# Patient Record
Sex: FEMALE | Race: WHITE | NOT HISPANIC OR LATINO | Employment: FULL TIME | URBAN - METROPOLITAN AREA
[De-identification: names, ages, dates, MRNs, and addresses within clinical notes are randomized per-mention and may not be internally consistent; named-entity substitution may affect disease eponyms.]

---

## 2017-01-02 ENCOUNTER — COMMUNICATION - HEALTHEAST (OUTPATIENT)
Dept: INTERNAL MEDICINE | Facility: CLINIC | Age: 57
End: 2017-01-02

## 2017-02-07 ENCOUNTER — COMMUNICATION - HEALTHEAST (OUTPATIENT)
Dept: INTERNAL MEDICINE | Facility: CLINIC | Age: 57
End: 2017-02-07

## 2017-04-02 ENCOUNTER — COMMUNICATION - HEALTHEAST (OUTPATIENT)
Dept: INTERNAL MEDICINE | Facility: CLINIC | Age: 57
End: 2017-04-02

## 2017-05-08 ENCOUNTER — COMMUNICATION - HEALTHEAST (OUTPATIENT)
Dept: INTERNAL MEDICINE | Facility: CLINIC | Age: 57
End: 2017-05-08

## 2017-07-01 ENCOUNTER — COMMUNICATION - HEALTHEAST (OUTPATIENT)
Dept: INTERNAL MEDICINE | Facility: CLINIC | Age: 57
End: 2017-07-01

## 2017-07-10 ENCOUNTER — RECORDS - HEALTHEAST (OUTPATIENT)
Dept: LAB | Facility: CLINIC | Age: 57
End: 2017-07-10

## 2017-07-10 ENCOUNTER — COMMUNICATION - HEALTHEAST (OUTPATIENT)
Dept: INTERNAL MEDICINE | Facility: CLINIC | Age: 57
End: 2017-07-10

## 2017-07-11 ENCOUNTER — COMMUNICATION - HEALTHEAST (OUTPATIENT)
Dept: INTERNAL MEDICINE | Facility: CLINIC | Age: 57
End: 2017-07-11

## 2017-07-11 LAB — HBA1C MFR BLD: 10.2 % (ref 4.2–6.1)

## 2017-07-17 ENCOUNTER — RECORDS - HEALTHEAST (OUTPATIENT)
Dept: ADMINISTRATIVE | Facility: OTHER | Age: 57
End: 2017-07-17

## 2017-07-31 ENCOUNTER — OFFICE VISIT - HEALTHEAST (OUTPATIENT)
Dept: INTERNAL MEDICINE | Facility: CLINIC | Age: 57
End: 2017-07-31

## 2017-07-31 DIAGNOSIS — I10 ESSENTIAL HYPERTENSION WITH GOAL BLOOD PRESSURE LESS THAN 140/90: ICD-10-CM

## 2017-07-31 DIAGNOSIS — E11.9 TYPE 2 DIABETES, HBA1C GOAL < 8% (H): ICD-10-CM

## 2017-07-31 DIAGNOSIS — R73.09 OTHER ABNORMAL GLUCOSE: ICD-10-CM

## 2017-07-31 DIAGNOSIS — I73.9 PVD (PERIPHERAL VASCULAR DISEASE) (H): ICD-10-CM

## 2017-07-31 ASSESSMENT — MIFFLIN-ST. JEOR: SCORE: 1549.26

## 2017-08-08 ENCOUNTER — AMBULATORY - HEALTHEAST (OUTPATIENT)
Dept: EDUCATION SERVICES | Facility: CLINIC | Age: 57
End: 2017-08-08

## 2017-08-08 DIAGNOSIS — E11.9 DIABETES (H): ICD-10-CM

## 2017-08-08 DIAGNOSIS — E11.9 TYPE 2 DIABETES, HBA1C GOAL < 8% (H): ICD-10-CM

## 2017-08-22 ENCOUNTER — OFFICE VISIT - HEALTHEAST (OUTPATIENT)
Dept: INTERNAL MEDICINE | Facility: CLINIC | Age: 57
End: 2017-08-22

## 2017-08-22 DIAGNOSIS — Z12.31 VISIT FOR SCREENING MAMMOGRAM: ICD-10-CM

## 2017-08-22 DIAGNOSIS — Z90.710 H/O: HYSTERECTOMY: ICD-10-CM

## 2017-08-22 DIAGNOSIS — I10 ESSENTIAL HYPERTENSION WITH GOAL BLOOD PRESSURE LESS THAN 140/90: ICD-10-CM

## 2017-08-22 DIAGNOSIS — E11.9 TYPE 2 DIABETES, HBA1C GOAL < 8% (H): ICD-10-CM

## 2017-08-22 DIAGNOSIS — Z12.11 SCREEN FOR COLON CANCER: ICD-10-CM

## 2017-08-22 ASSESSMENT — MIFFLIN-ST. JEOR: SCORE: 1535.65

## 2017-09-18 ENCOUNTER — COMMUNICATION - HEALTHEAST (OUTPATIENT)
Dept: ENDOCRINOLOGY | Facility: CLINIC | Age: 57
End: 2017-09-18

## 2017-09-18 ENCOUNTER — OFFICE VISIT - HEALTHEAST (OUTPATIENT)
Dept: EDUCATION SERVICES | Facility: CLINIC | Age: 57
End: 2017-09-18

## 2017-09-18 DIAGNOSIS — E11.9 TYPE 2 DIABETES, HBA1C GOAL < 8% (H): ICD-10-CM

## 2017-09-26 ENCOUNTER — COMMUNICATION - HEALTHEAST (OUTPATIENT)
Dept: INTERNAL MEDICINE | Facility: CLINIC | Age: 57
End: 2017-09-26

## 2017-09-26 DIAGNOSIS — E11.9 TYPE 2 DIABETES, HBA1C GOAL < 8% (H): ICD-10-CM

## 2017-11-06 ENCOUNTER — OFFICE VISIT - HEALTHEAST (OUTPATIENT)
Dept: INTERNAL MEDICINE | Facility: CLINIC | Age: 57
End: 2017-11-06

## 2017-11-06 ENCOUNTER — COMMUNICATION - HEALTHEAST (OUTPATIENT)
Dept: INTERNAL MEDICINE | Facility: CLINIC | Age: 57
End: 2017-11-06

## 2017-11-06 DIAGNOSIS — I10 ESSENTIAL HYPERTENSION: ICD-10-CM

## 2017-11-06 DIAGNOSIS — L03.90 CELLULITIS: ICD-10-CM

## 2017-11-06 DIAGNOSIS — E11.9 TYPE 2 DIABETES, HBA1C GOAL < 8% (H): ICD-10-CM

## 2017-11-06 LAB — HBA1C MFR BLD: 7.1 % (ref 3.5–6)

## 2017-11-06 ASSESSMENT — MIFFLIN-ST. JEOR: SCORE: 1522.04

## 2017-11-21 ENCOUNTER — OFFICE VISIT - HEALTHEAST (OUTPATIENT)
Dept: INTERNAL MEDICINE | Facility: CLINIC | Age: 57
End: 2017-11-21

## 2017-11-21 DIAGNOSIS — I10 ESSENTIAL HYPERTENSION: ICD-10-CM

## 2017-11-21 DIAGNOSIS — E11.9 TYPE 2 DIABETES, HBA1C GOAL < 8% (H): ICD-10-CM

## 2017-11-21 ASSESSMENT — MIFFLIN-ST. JEOR: SCORE: 1535.65

## 2017-11-27 ENCOUNTER — OFFICE VISIT - HEALTHEAST (OUTPATIENT)
Dept: EDUCATION SERVICES | Facility: CLINIC | Age: 57
End: 2017-11-27

## 2017-11-27 DIAGNOSIS — E11.9 TYPE 2 DIABETES, HBA1C GOAL < 8% (H): ICD-10-CM

## 2017-12-19 ENCOUNTER — COMMUNICATION - HEALTHEAST (OUTPATIENT)
Dept: INTERNAL MEDICINE | Facility: CLINIC | Age: 57
End: 2017-12-19

## 2018-01-23 ENCOUNTER — COMMUNICATION - HEALTHEAST (OUTPATIENT)
Dept: INTERNAL MEDICINE | Facility: CLINIC | Age: 58
End: 2018-01-23

## 2018-01-23 DIAGNOSIS — G62.9 NEUROPATHY: ICD-10-CM

## 2018-01-23 DIAGNOSIS — I10 HTN (HYPERTENSION): ICD-10-CM

## 2018-01-31 ENCOUNTER — COMMUNICATION - HEALTHEAST (OUTPATIENT)
Dept: INTERNAL MEDICINE | Facility: CLINIC | Age: 58
End: 2018-01-31

## 2018-01-31 DIAGNOSIS — E11.9 DIABETES (H): ICD-10-CM

## 2018-02-17 ENCOUNTER — COMMUNICATION - HEALTHEAST (OUTPATIENT)
Dept: INTERNAL MEDICINE | Facility: CLINIC | Age: 58
End: 2018-02-17

## 2018-02-21 ENCOUNTER — COMMUNICATION - HEALTHEAST (OUTPATIENT)
Dept: INTERNAL MEDICINE | Facility: CLINIC | Age: 58
End: 2018-02-21

## 2018-02-21 DIAGNOSIS — E11.9 DIABETES (H): ICD-10-CM

## 2018-03-19 ENCOUNTER — OFFICE VISIT - HEALTHEAST (OUTPATIENT)
Dept: EDUCATION SERVICES | Facility: CLINIC | Age: 58
End: 2018-03-19

## 2018-03-19 DIAGNOSIS — E11.9 TYPE 2 DIABETES, HBA1C GOAL < 8% (H): ICD-10-CM

## 2018-04-29 ENCOUNTER — COMMUNICATION - HEALTHEAST (OUTPATIENT)
Dept: INTERNAL MEDICINE | Facility: CLINIC | Age: 58
End: 2018-04-29

## 2018-04-29 DIAGNOSIS — I10 ESSENTIAL HYPERTENSION: ICD-10-CM

## 2018-05-17 ENCOUNTER — COMMUNICATION - HEALTHEAST (OUTPATIENT)
Dept: INTERNAL MEDICINE | Facility: CLINIC | Age: 58
End: 2018-05-17

## 2018-05-17 DIAGNOSIS — E11.9 DIABETES (H): ICD-10-CM

## 2018-05-21 ENCOUNTER — COMMUNICATION - HEALTHEAST (OUTPATIENT)
Dept: INTERNAL MEDICINE | Facility: CLINIC | Age: 58
End: 2018-05-21

## 2018-05-21 DIAGNOSIS — E11.9 TYPE 2 DIABETES, HBA1C GOAL < 8% (H): ICD-10-CM

## 2018-06-10 ENCOUNTER — COMMUNICATION - HEALTHEAST (OUTPATIENT)
Dept: INTERNAL MEDICINE | Facility: CLINIC | Age: 58
End: 2018-06-10

## 2018-07-04 ENCOUNTER — COMMUNICATION - HEALTHEAST (OUTPATIENT)
Dept: INTERNAL MEDICINE | Facility: CLINIC | Age: 58
End: 2018-07-04

## 2018-07-04 DIAGNOSIS — E11.9 TYPE 2 DIABETES, HBA1C GOAL < 8% (H): ICD-10-CM

## 2018-08-08 ENCOUNTER — RECORDS - HEALTHEAST (OUTPATIENT)
Dept: ADMINISTRATIVE | Facility: OTHER | Age: 58
End: 2018-08-08

## 2018-08-30 ENCOUNTER — COMMUNICATION - HEALTHEAST (OUTPATIENT)
Dept: INTERNAL MEDICINE | Facility: CLINIC | Age: 58
End: 2018-08-30

## 2018-08-30 DIAGNOSIS — G62.9 NEUROPATHY: ICD-10-CM

## 2018-09-12 ENCOUNTER — COMMUNICATION - HEALTHEAST (OUTPATIENT)
Dept: INTERNAL MEDICINE | Facility: CLINIC | Age: 58
End: 2018-09-12

## 2018-09-12 DIAGNOSIS — E11.9 TYPE 2 DIABETES, HBA1C GOAL < 8% (H): ICD-10-CM

## 2018-09-28 ENCOUNTER — COMMUNICATION - HEALTHEAST (OUTPATIENT)
Dept: INTERNAL MEDICINE | Facility: CLINIC | Age: 58
End: 2018-09-28

## 2018-09-28 DIAGNOSIS — G62.9 NEUROPATHY: ICD-10-CM

## 2018-10-07 ENCOUNTER — COMMUNICATION - HEALTHEAST (OUTPATIENT)
Dept: INTERNAL MEDICINE | Facility: CLINIC | Age: 58
End: 2018-10-07

## 2018-10-07 DIAGNOSIS — E11.9 TYPE 2 DIABETES, HBA1C GOAL < 8% (H): ICD-10-CM

## 2018-11-01 ENCOUNTER — COMMUNICATION - HEALTHEAST (OUTPATIENT)
Dept: INTERNAL MEDICINE | Facility: CLINIC | Age: 58
End: 2018-11-01

## 2018-11-01 DIAGNOSIS — I10 ESSENTIAL HYPERTENSION: ICD-10-CM

## 2018-12-28 ENCOUNTER — COMMUNICATION - HEALTHEAST (OUTPATIENT)
Dept: INTERNAL MEDICINE | Facility: CLINIC | Age: 58
End: 2018-12-28

## 2018-12-28 DIAGNOSIS — E11.9 TYPE 2 DIABETES, HBA1C GOAL < 8% (H): ICD-10-CM

## 2019-01-11 ENCOUNTER — COMMUNICATION - HEALTHEAST (OUTPATIENT)
Dept: INTERNAL MEDICINE | Facility: CLINIC | Age: 59
End: 2019-01-11

## 2019-01-11 DIAGNOSIS — E11.9 TYPE 2 DIABETES, HBA1C GOAL < 8% (H): ICD-10-CM

## 2019-01-24 ENCOUNTER — COMMUNICATION - HEALTHEAST (OUTPATIENT)
Dept: INTERNAL MEDICINE | Facility: CLINIC | Age: 59
End: 2019-01-24

## 2019-01-24 DIAGNOSIS — I10 HTN (HYPERTENSION): ICD-10-CM

## 2019-01-24 DIAGNOSIS — I10 ESSENTIAL HYPERTENSION: ICD-10-CM

## 2019-02-08 ENCOUNTER — COMMUNICATION - HEALTHEAST (OUTPATIENT)
Dept: INTERNAL MEDICINE | Facility: CLINIC | Age: 59
End: 2019-02-08

## 2019-02-08 DIAGNOSIS — G62.9 NEUROPATHY: ICD-10-CM

## 2019-02-20 ENCOUNTER — COMMUNICATION - HEALTHEAST (OUTPATIENT)
Dept: INTERNAL MEDICINE | Facility: CLINIC | Age: 59
End: 2019-02-20

## 2019-02-20 DIAGNOSIS — I10 HTN (HYPERTENSION): ICD-10-CM

## 2019-03-27 ENCOUNTER — COMMUNICATION - HEALTHEAST (OUTPATIENT)
Dept: INTERNAL MEDICINE | Facility: CLINIC | Age: 59
End: 2019-03-27

## 2019-03-27 DIAGNOSIS — I10 HTN (HYPERTENSION): ICD-10-CM

## 2019-04-08 ENCOUNTER — COMMUNICATION - HEALTHEAST (OUTPATIENT)
Dept: INTERNAL MEDICINE | Facility: CLINIC | Age: 59
End: 2019-04-08

## 2019-04-08 DIAGNOSIS — E11.9 TYPE 2 DIABETES, HBA1C GOAL < 8% (H): ICD-10-CM

## 2019-04-18 ENCOUNTER — COMMUNICATION - HEALTHEAST (OUTPATIENT)
Dept: INTERNAL MEDICINE | Facility: CLINIC | Age: 59
End: 2019-04-18

## 2019-04-18 DIAGNOSIS — I10 ESSENTIAL HYPERTENSION: ICD-10-CM

## 2019-04-26 ENCOUNTER — COMMUNICATION - HEALTHEAST (OUTPATIENT)
Dept: INTERNAL MEDICINE | Facility: CLINIC | Age: 59
End: 2019-04-26

## 2019-04-26 DIAGNOSIS — E11.9 TYPE 2 DIABETES, HBA1C GOAL < 8% (H): ICD-10-CM

## 2019-04-29 ENCOUNTER — COMMUNICATION - HEALTHEAST (OUTPATIENT)
Dept: INTERNAL MEDICINE | Facility: CLINIC | Age: 59
End: 2019-04-29

## 2019-04-29 DIAGNOSIS — E11.9 TYPE 2 DIABETES, HBA1C GOAL < 8% (H): ICD-10-CM

## 2019-05-04 ENCOUNTER — COMMUNICATION - HEALTHEAST (OUTPATIENT)
Dept: INTERNAL MEDICINE | Facility: CLINIC | Age: 59
End: 2019-05-04

## 2019-05-04 DIAGNOSIS — I10 HTN (HYPERTENSION): ICD-10-CM

## 2019-05-19 ENCOUNTER — COMMUNICATION - HEALTHEAST (OUTPATIENT)
Dept: INTERNAL MEDICINE | Facility: CLINIC | Age: 59
End: 2019-05-19

## 2019-05-19 DIAGNOSIS — G62.9 NEUROPATHY: ICD-10-CM

## 2019-06-10 ENCOUNTER — OFFICE VISIT - HEALTHEAST (OUTPATIENT)
Dept: INTERNAL MEDICINE | Facility: CLINIC | Age: 59
End: 2019-06-10

## 2019-06-10 DIAGNOSIS — G58.8 OTHER MONONEUROPATHY: ICD-10-CM

## 2019-06-10 DIAGNOSIS — I10 ESSENTIAL HYPERTENSION: ICD-10-CM

## 2019-06-10 DIAGNOSIS — Z12.31 VISIT FOR SCREENING MAMMOGRAM: ICD-10-CM

## 2019-06-10 DIAGNOSIS — E11.9 TYPE 2 DIABETES, HBA1C GOAL < 8% (H): ICD-10-CM

## 2019-06-10 DIAGNOSIS — M51.379 DEGENERATION OF LUMBAR OR LUMBOSACRAL INTERVERTEBRAL DISC: ICD-10-CM

## 2019-06-10 LAB
ALBUMIN SERPL-MCNC: 3.8 G/DL (ref 3.5–5)
ALP SERPL-CCNC: 56 U/L (ref 45–120)
ALT SERPL W P-5'-P-CCNC: 22 U/L (ref 0–45)
ANION GAP SERPL CALCULATED.3IONS-SCNC: 10 MMOL/L (ref 5–18)
AST SERPL W P-5'-P-CCNC: 11 U/L (ref 0–40)
BILIRUB SERPL-MCNC: 0.4 MG/DL (ref 0–1)
BUN SERPL-MCNC: 11 MG/DL (ref 8–22)
CALCIUM SERPL-MCNC: 9.8 MG/DL (ref 8.5–10.5)
CHLORIDE BLD-SCNC: 103 MMOL/L (ref 98–107)
CHOLEST SERPL-MCNC: 134 MG/DL
CO2 SERPL-SCNC: 26 MMOL/L (ref 22–31)
CREAT SERPL-MCNC: 0.69 MG/DL (ref 0.6–1.1)
CREAT UR-MCNC: 77.5 MG/DL
ERYTHROCYTE [DISTWIDTH] IN BLOOD BY AUTOMATED COUNT: 12.9 % (ref 11–14.5)
FASTING STATUS PATIENT QL REPORTED: ABNORMAL
GFR SERPL CREATININE-BSD FRML MDRD: >60 ML/MIN/1.73M2
GLUCOSE BLD-MCNC: 172 MG/DL (ref 70–125)
HBA1C MFR BLD: 7.6 % (ref 3.5–6)
HCT VFR BLD AUTO: 39.7 % (ref 35–47)
HDLC SERPL-MCNC: 43 MG/DL
HGB BLD-MCNC: 13.3 G/DL (ref 12–16)
LDLC SERPL CALC-MCNC: 72 MG/DL
MCH RBC QN AUTO: 29.6 PG (ref 27–34)
MCHC RBC AUTO-ENTMCNC: 33.6 G/DL (ref 32–36)
MCV RBC AUTO: 88 FL (ref 80–100)
MICROALBUMIN UR-MCNC: 1.16 MG/DL (ref 0–1.99)
MICROALBUMIN/CREAT UR: 15 MG/G
PLATELET # BLD AUTO: 302 THOU/UL (ref 140–440)
PMV BLD AUTO: 7.4 FL (ref 7–10)
POTASSIUM BLD-SCNC: 3.8 MMOL/L (ref 3.5–5)
PROT SERPL-MCNC: 7.5 G/DL (ref 6–8)
RBC # BLD AUTO: 4.49 MILL/UL (ref 3.8–5.4)
SODIUM SERPL-SCNC: 139 MMOL/L (ref 136–145)
TRIGL SERPL-MCNC: 94 MG/DL
WBC: 6.5 THOU/UL (ref 4–11)

## 2019-06-11 ENCOUNTER — COMMUNICATION - HEALTHEAST (OUTPATIENT)
Dept: INTERNAL MEDICINE | Facility: CLINIC | Age: 59
End: 2019-06-11

## 2019-06-13 ENCOUNTER — COMMUNICATION - HEALTHEAST (OUTPATIENT)
Dept: INTERNAL MEDICINE | Facility: CLINIC | Age: 59
End: 2019-06-13

## 2019-06-13 DIAGNOSIS — I10 HTN (HYPERTENSION): ICD-10-CM

## 2019-06-19 ENCOUNTER — RECORDS - HEALTHEAST (OUTPATIENT)
Dept: HEALTH INFORMATION MANAGEMENT | Facility: CLINIC | Age: 59
End: 2019-06-19

## 2019-07-06 ENCOUNTER — COMMUNICATION - HEALTHEAST (OUTPATIENT)
Dept: INTERNAL MEDICINE | Facility: CLINIC | Age: 59
End: 2019-07-06

## 2019-07-06 DIAGNOSIS — E11.9 TYPE 2 DIABETES, HBA1C GOAL < 8% (H): ICD-10-CM

## 2019-07-14 ENCOUNTER — COMMUNICATION - HEALTHEAST (OUTPATIENT)
Dept: INTERNAL MEDICINE | Facility: CLINIC | Age: 59
End: 2019-07-14

## 2019-07-14 DIAGNOSIS — I10 HTN (HYPERTENSION): ICD-10-CM

## 2019-07-17 ENCOUNTER — COMMUNICATION - HEALTHEAST (OUTPATIENT)
Dept: INTERNAL MEDICINE | Facility: CLINIC | Age: 59
End: 2019-07-17

## 2019-07-17 DIAGNOSIS — G62.9 NEUROPATHY: ICD-10-CM

## 2019-07-23 ENCOUNTER — COMMUNICATION - HEALTHEAST (OUTPATIENT)
Dept: INTERNAL MEDICINE | Facility: CLINIC | Age: 59
End: 2019-07-23

## 2019-07-23 DIAGNOSIS — I10 ESSENTIAL HYPERTENSION: ICD-10-CM

## 2019-10-18 ENCOUNTER — COMMUNICATION - HEALTHEAST (OUTPATIENT)
Dept: INTERNAL MEDICINE | Facility: CLINIC | Age: 59
End: 2019-10-18

## 2019-10-20 ENCOUNTER — COMMUNICATION - HEALTHEAST (OUTPATIENT)
Dept: INTERNAL MEDICINE | Facility: CLINIC | Age: 59
End: 2019-10-20

## 2019-10-20 DIAGNOSIS — E11.9 TYPE 2 DIABETES, HBA1C GOAL < 8% (H): ICD-10-CM

## 2019-10-21 ENCOUNTER — COMMUNICATION - HEALTHEAST (OUTPATIENT)
Dept: INTERNAL MEDICINE | Facility: CLINIC | Age: 59
End: 2019-10-21

## 2019-10-21 ENCOUNTER — ANESTHESIA EVENT (OUTPATIENT)
Dept: SURGERY | Facility: CLINIC | Age: 59
End: 2019-10-21
Payer: COMMERCIAL

## 2019-10-21 ENCOUNTER — OFFICE VISIT - HEALTHEAST (OUTPATIENT)
Dept: INTERNAL MEDICINE | Facility: CLINIC | Age: 59
End: 2019-10-21

## 2019-10-21 DIAGNOSIS — M86.272 SUBACUTE OSTEOMYELITIS OF LEFT FOOT (H): ICD-10-CM

## 2019-10-21 DIAGNOSIS — I10 ESSENTIAL HYPERTENSION: ICD-10-CM

## 2019-10-21 DIAGNOSIS — E11.9 TYPE 2 DIABETES, HBA1C GOAL < 8% (H): ICD-10-CM

## 2019-10-21 DIAGNOSIS — Z01.818 PREOP GENERAL PHYSICAL EXAM: ICD-10-CM

## 2019-10-21 DIAGNOSIS — M51.379 DEGENERATION OF LUMBAR OR LUMBOSACRAL INTERVERTEBRAL DISC: ICD-10-CM

## 2019-10-21 LAB
ERYTHROCYTE [DISTWIDTH] IN BLOOD BY AUTOMATED COUNT: 12.3 % (ref 11–14.5)
HBA1C MFR BLD: 8.8 % (ref 3.5–6)
HCT VFR BLD AUTO: 35.8 % (ref 35–47)
HGB BLD-MCNC: 12.2 G/DL (ref 12–16)
MCH RBC QN AUTO: 30.1 PG (ref 27–34)
MCHC RBC AUTO-ENTMCNC: 34.1 G/DL (ref 32–36)
MCV RBC AUTO: 88 FL (ref 80–100)
PLATELET # BLD AUTO: 381 THOU/UL (ref 140–440)
PMV BLD AUTO: 6.9 FL (ref 7–10)
RBC # BLD AUTO: 4.05 MILL/UL (ref 3.8–5.4)
WBC: 8.1 THOU/UL (ref 4–11)

## 2019-10-21 ASSESSMENT — MIFFLIN-ST. JEOR: SCORE: 1560.14

## 2019-10-22 ENCOUNTER — ANESTHESIA (OUTPATIENT)
Dept: SURGERY | Facility: CLINIC | Age: 59
End: 2019-10-22
Payer: COMMERCIAL

## 2019-10-22 ENCOUNTER — HOSPITAL ENCOUNTER (OUTPATIENT)
Facility: CLINIC | Age: 59
Discharge: HOME | End: 2019-10-22
Attending: PODIATRIST | Admitting: PODIATRIST
Payer: COMMERCIAL

## 2019-10-22 VITALS
TEMPERATURE: 96.3 F | HEIGHT: 66 IN | OXYGEN SATURATION: 95 % | SYSTOLIC BLOOD PRESSURE: 147 MMHG | HEART RATE: 71 BPM | BODY MASS INDEX: 35.36 KG/M2 | DIASTOLIC BLOOD PRESSURE: 89 MMHG | RESPIRATION RATE: 18 BRPM | WEIGHT: 220 LBS

## 2019-10-22 DIAGNOSIS — M86.672 CHRONIC OSTEOMYELITIS OF TOE OF LEFT FOOT (H): Primary | ICD-10-CM

## 2019-10-22 LAB
ALBUMIN SERPL-MCNC: 3.7 G/DL (ref 3.5–5)
ALP SERPL-CCNC: 67 U/L (ref 45–120)
ALT SERPL W P-5'-P-CCNC: 25 U/L (ref 0–45)
ANION GAP SERPL CALCULATED.3IONS-SCNC: 10 MMOL/L (ref 5–18)
AST SERPL W P-5'-P-CCNC: 16 U/L (ref 0–40)
BILIRUB SERPL-MCNC: 0.4 MG/DL (ref 0–1)
BUN SERPL-MCNC: 17 MG/DL (ref 8–22)
C REACTIVE PROTEIN LHE: 1 MG/DL (ref 0–0.8)
CALCIUM SERPL-MCNC: 10.3 MG/DL (ref 8.5–10.5)
CHLORIDE BLD-SCNC: 102 MMOL/L (ref 98–107)
CO2 SERPL-SCNC: 28 MMOL/L (ref 22–31)
CREAT SERPL-MCNC: 0.82 MG/DL (ref 0.6–1.1)
ERYTHROCYTE [SEDIMENTATION RATE] IN BLOOD BY WESTERGREN METHOD: 72 MM/HR (ref 0–20)
GFR SERPL CREATININE-BSD FRML MDRD: >60 ML/MIN/1.73M2
GLUCOSE BLD-MCNC: 166 MG/DL (ref 70–125)
GLUCOSE BLDC GLUCOMTR-MCNC: 166 MG/DL (ref 70–99)
GRAM STN SPEC: NORMAL
GRAM STN SPEC: NORMAL
Lab: NORMAL
POTASSIUM BLD-SCNC: 4 MMOL/L (ref 3.5–5)
PROT SERPL-MCNC: 8 G/DL (ref 6–8)
SODIUM SERPL-SCNC: 140 MMOL/L (ref 136–145)
SPECIMEN SOURCE: NORMAL

## 2019-10-22 PROCEDURE — 40000169 ZZH STATISTIC PRE-PROCEDURE ASSESSMENT I: Performed by: PODIATRIST

## 2019-10-22 PROCEDURE — 88305 TISSUE EXAM BY PATHOLOGIST: CPT | Mod: 26 | Performed by: PODIATRIST

## 2019-10-22 PROCEDURE — 25000125 ZZHC RX 250: Performed by: NURSE ANESTHETIST, CERTIFIED REGISTERED

## 2019-10-22 PROCEDURE — 25000128 H RX IP 250 OP 636: Performed by: NURSE ANESTHETIST, CERTIFIED REGISTERED

## 2019-10-22 PROCEDURE — 37000008 ZZH ANESTHESIA TECHNICAL FEE, 1ST 30 MIN: Performed by: PODIATRIST

## 2019-10-22 PROCEDURE — 87205 SMEAR GRAM STAIN: CPT | Performed by: PODIATRIST

## 2019-10-22 PROCEDURE — 25000128 H RX IP 250 OP 636: Performed by: PODIATRIST

## 2019-10-22 PROCEDURE — 82962 GLUCOSE BLOOD TEST: CPT

## 2019-10-22 PROCEDURE — 25800030 ZZH RX IP 258 OP 636: Performed by: NURSE ANESTHETIST, CERTIFIED REGISTERED

## 2019-10-22 PROCEDURE — 25000125 ZZHC RX 250: Performed by: PODIATRIST

## 2019-10-22 PROCEDURE — 71000027 ZZH RECOVERY PHASE 2 EACH 15 MINS: Performed by: PODIATRIST

## 2019-10-22 PROCEDURE — 37000009 ZZH ANESTHESIA TECHNICAL FEE, EACH ADDTL 15 MIN: Performed by: PODIATRIST

## 2019-10-22 PROCEDURE — 88311 DECALCIFY TISSUE: CPT | Performed by: PODIATRIST

## 2019-10-22 PROCEDURE — 87077 CULTURE AEROBIC IDENTIFY: CPT | Performed by: PODIATRIST

## 2019-10-22 PROCEDURE — 88305 TISSUE EXAM BY PATHOLOGIST: CPT | Performed by: PODIATRIST

## 2019-10-22 PROCEDURE — 36000050 ZZH SURGERY LEVEL 2 1ST 30 MIN: Performed by: PODIATRIST

## 2019-10-22 PROCEDURE — 87070 CULTURE OTHR SPECIMN AEROBIC: CPT | Performed by: PODIATRIST

## 2019-10-22 PROCEDURE — 36000052 ZZH SURGERY LEVEL 2 EA 15 ADDTL MIN: Performed by: PODIATRIST

## 2019-10-22 PROCEDURE — 27210794 ZZH OR GENERAL SUPPLY STERILE: Performed by: PODIATRIST

## 2019-10-22 PROCEDURE — 87075 CULTR BACTERIA EXCEPT BLOOD: CPT | Performed by: PODIATRIST

## 2019-10-22 PROCEDURE — 87076 CULTURE ANAEROBE IDENT EACH: CPT | Performed by: PODIATRIST

## 2019-10-22 PROCEDURE — 71000012 ZZH RECOVERY PHASE 1 LEVEL 1 FIRST HR: Performed by: PODIATRIST

## 2019-10-22 PROCEDURE — 87186 SC STD MICRODIL/AGAR DIL: CPT | Performed by: PODIATRIST

## 2019-10-22 PROCEDURE — 88311 DECALCIFY TISSUE: CPT | Mod: 26 | Performed by: PODIATRIST

## 2019-10-22 RX ORDER — GABAPENTIN 100 MG/1
CAPSULE ORAL 3 TIMES DAILY
COMMUNITY
End: 2021-11-29

## 2019-10-22 RX ORDER — LISINOPRIL 10 MG/1
TABLET ORAL DAILY
COMMUNITY
End: 2021-11-29

## 2019-10-22 RX ORDER — PROPOFOL 10 MG/ML
INJECTION, EMULSION INTRAVENOUS CONTINUOUS PRN
Status: DISCONTINUED | OUTPATIENT
Start: 2019-10-22 | End: 2019-10-22

## 2019-10-22 RX ORDER — BUPIVACAINE HYDROCHLORIDE 5 MG/ML
INJECTION, SOLUTION PERINEURAL PRN
Status: DISCONTINUED | OUTPATIENT
Start: 2019-10-22 | End: 2019-10-22 | Stop reason: HOSPADM

## 2019-10-22 RX ORDER — NALOXONE HYDROCHLORIDE 0.4 MG/ML
.1-.4 INJECTION, SOLUTION INTRAMUSCULAR; INTRAVENOUS; SUBCUTANEOUS
Status: DISCONTINUED | OUTPATIENT
Start: 2019-10-22 | End: 2019-10-22 | Stop reason: HOSPADM

## 2019-10-22 RX ORDER — ONDANSETRON 2 MG/ML
4 INJECTION INTRAMUSCULAR; INTRAVENOUS EVERY 30 MIN PRN
Status: DISCONTINUED | OUTPATIENT
Start: 2019-10-22 | End: 2019-10-22 | Stop reason: HOSPADM

## 2019-10-22 RX ORDER — ATORVASTATIN CALCIUM 10 MG/1
TABLET, FILM COATED ORAL DAILY
COMMUNITY
End: 2021-10-26

## 2019-10-22 RX ORDER — LIDOCAINE HYDROCHLORIDE 20 MG/ML
INJECTION, SOLUTION INFILTRATION; PERINEURAL PRN
Status: DISCONTINUED | OUTPATIENT
Start: 2019-10-22 | End: 2019-10-22

## 2019-10-22 RX ORDER — HYDROCODONE BITARTRATE AND ACETAMINOPHEN 5; 325 MG/1; MG/1
1-2 TABLET ORAL EVERY 4 HOURS PRN
Qty: 30 TABLET | Refills: 0 | Status: SHIPPED | OUTPATIENT
Start: 2019-10-22 | End: 2022-04-19

## 2019-10-22 RX ORDER — PROPOFOL 10 MG/ML
INJECTION, EMULSION INTRAVENOUS PRN
Status: DISCONTINUED | OUTPATIENT
Start: 2019-10-22 | End: 2019-10-22

## 2019-10-22 RX ORDER — MEPERIDINE HYDROCHLORIDE 25 MG/ML
12.5 INJECTION INTRAMUSCULAR; INTRAVENOUS; SUBCUTANEOUS
Status: DISCONTINUED | OUTPATIENT
Start: 2019-10-22 | End: 2019-10-22 | Stop reason: HOSPADM

## 2019-10-22 RX ORDER — ONDANSETRON 2 MG/ML
INJECTION INTRAMUSCULAR; INTRAVENOUS PRN
Status: DISCONTINUED | OUTPATIENT
Start: 2019-10-22 | End: 2019-10-22

## 2019-10-22 RX ORDER — VANCOMYCIN HYDROCHLORIDE 1 G/200ML
1000 INJECTION, SOLUTION INTRAVENOUS
Status: COMPLETED | OUTPATIENT
Start: 2019-10-22 | End: 2019-10-22

## 2019-10-22 RX ORDER — FENTANYL CITRATE 50 UG/ML
INJECTION, SOLUTION INTRAMUSCULAR; INTRAVENOUS PRN
Status: DISCONTINUED | OUTPATIENT
Start: 2019-10-22 | End: 2019-10-22

## 2019-10-22 RX ORDER — METOPROLOL SUCCINATE 100 MG/1
TABLET, EXTENDED RELEASE ORAL DAILY
COMMUNITY
End: 2021-11-05

## 2019-10-22 RX ORDER — VANCOMYCIN HYDROCHLORIDE 1 G/200ML
1000 INJECTION, SOLUTION INTRAVENOUS SEE ADMIN INSTRUCTIONS
Status: DISCONTINUED | OUTPATIENT
Start: 2019-10-22 | End: 2019-10-22 | Stop reason: HOSPADM

## 2019-10-22 RX ORDER — HYDROCODONE BITARTRATE AND ACETAMINOPHEN 5; 325 MG/1; MG/1
1 TABLET ORAL
Status: DISCONTINUED | OUTPATIENT
Start: 2019-10-22 | End: 2019-10-22 | Stop reason: HOSPADM

## 2019-10-22 RX ORDER — SODIUM CHLORIDE, SODIUM LACTATE, POTASSIUM CHLORIDE, CALCIUM CHLORIDE 600; 310; 30; 20 MG/100ML; MG/100ML; MG/100ML; MG/100ML
INJECTION, SOLUTION INTRAVENOUS CONTINUOUS PRN
Status: DISCONTINUED | OUTPATIENT
Start: 2019-10-22 | End: 2019-10-22

## 2019-10-22 RX ORDER — ONDANSETRON 4 MG/1
4 TABLET, ORALLY DISINTEGRATING ORAL EVERY 30 MIN PRN
Status: DISCONTINUED | OUTPATIENT
Start: 2019-10-22 | End: 2019-10-22 | Stop reason: HOSPADM

## 2019-10-22 RX ORDER — FENTANYL CITRATE 50 UG/ML
25-50 INJECTION, SOLUTION INTRAMUSCULAR; INTRAVENOUS EVERY 5 MIN PRN
Status: DISCONTINUED | OUTPATIENT
Start: 2019-10-22 | End: 2019-10-22 | Stop reason: HOSPADM

## 2019-10-22 RX ORDER — LABETALOL HYDROCHLORIDE 5 MG/ML
10 INJECTION, SOLUTION INTRAVENOUS
Status: DISCONTINUED | OUTPATIENT
Start: 2019-10-22 | End: 2019-10-22 | Stop reason: HOSPADM

## 2019-10-22 RX ORDER — FENTANYL CITRATE 50 UG/ML
25-50 INJECTION, SOLUTION INTRAMUSCULAR; INTRAVENOUS
Status: DISCONTINUED | OUTPATIENT
Start: 2019-10-22 | End: 2019-10-22 | Stop reason: HOSPADM

## 2019-10-22 RX ORDER — SODIUM CHLORIDE, SODIUM LACTATE, POTASSIUM CHLORIDE, CALCIUM CHLORIDE 600; 310; 30; 20 MG/100ML; MG/100ML; MG/100ML; MG/100ML
INJECTION, SOLUTION INTRAVENOUS CONTINUOUS
Status: DISCONTINUED | OUTPATIENT
Start: 2019-10-22 | End: 2019-10-22 | Stop reason: HOSPADM

## 2019-10-22 RX ORDER — GINSENG 100 MG
CAPSULE ORAL PRN
Status: DISCONTINUED | OUTPATIENT
Start: 2019-10-22 | End: 2019-10-22 | Stop reason: HOSPADM

## 2019-10-22 RX ORDER — HYDROMORPHONE HYDROCHLORIDE 1 MG/ML
.3-.5 INJECTION, SOLUTION INTRAMUSCULAR; INTRAVENOUS; SUBCUTANEOUS EVERY 10 MIN PRN
Status: DISCONTINUED | OUTPATIENT
Start: 2019-10-22 | End: 2019-10-22 | Stop reason: HOSPADM

## 2019-10-22 RX ORDER — ACETAMINOPHEN 325 MG/1
650 TABLET ORAL
Status: DISCONTINUED | OUTPATIENT
Start: 2019-10-22 | End: 2019-10-22 | Stop reason: HOSPADM

## 2019-10-22 RX ADMIN — MIDAZOLAM 2 MG: 1 INJECTION INTRAMUSCULAR; INTRAVENOUS at 07:35

## 2019-10-22 RX ADMIN — FENTANYL CITRATE 25 MCG: 50 INJECTION, SOLUTION INTRAMUSCULAR; INTRAVENOUS at 07:57

## 2019-10-22 RX ADMIN — PROPOFOL 75 MCG/KG/MIN: 10 INJECTION, EMULSION INTRAVENOUS at 07:38

## 2019-10-22 RX ADMIN — LIDOCAINE HYDROCHLORIDE 50 MG: 20 INJECTION, SOLUTION INFILTRATION; PERINEURAL at 07:38

## 2019-10-22 RX ADMIN — FENTANYL CITRATE 25 MCG: 50 INJECTION, SOLUTION INTRAMUSCULAR; INTRAVENOUS at 08:06

## 2019-10-22 RX ADMIN — VANCOMYCIN HYDROCHLORIDE 1000 MG: 1 INJECTION, SOLUTION INTRAVENOUS at 07:30

## 2019-10-22 RX ADMIN — SODIUM CHLORIDE, POTASSIUM CHLORIDE, SODIUM LACTATE AND CALCIUM CHLORIDE: 600; 310; 30; 20 INJECTION, SOLUTION INTRAVENOUS at 07:35

## 2019-10-22 RX ADMIN — ONDANSETRON 4 MG: 2 INJECTION INTRAMUSCULAR; INTRAVENOUS at 07:39

## 2019-10-22 RX ADMIN — PROPOFOL 30 MG: 10 INJECTION, EMULSION INTRAVENOUS at 07:38

## 2019-10-22 RX ADMIN — FENTANYL CITRATE 50 MCG: 50 INJECTION, SOLUTION INTRAMUSCULAR; INTRAVENOUS at 07:37

## 2019-10-22 ASSESSMENT — MIFFLIN-ST. JEOR: SCORE: 1579.66

## 2019-10-22 NOTE — ANESTHESIA POSTPROCEDURE EVALUATION
Patient: Carlene Agee    Procedure(s):  AMPUTATION, LEFT THIRD TOE    Diagnosis:Osteomyelitis of left foot, unspecified type (H) [M86.9]  Diagnosis Additional Information: No value filed.    Anesthesia Type:  MAC    Note:  Anesthesia Post Evaluation    Patient location during evaluation: PACU  Patient participation: Able to fully participate in evaluation  Level of consciousness: awake  Pain management: adequate  Airway patency: patent  Cardiovascular status: acceptable  Respiratory status: acceptable  Hydration status: acceptable  PONV: none     Anesthetic complications: None          Last vitals:  Vitals:    10/22/19 0900 10/22/19 0908 10/22/19 0949   BP: (!) 142/85 (!) 142/82 (!) 147/89   Pulse: 82 71    Resp: 15 18 18   Temp: 35.7  C (96.3  F) 35.7  C (96.3  F)    SpO2: 93% 94% 95%         Electronically Signed By: CL RODRIGUES MD  October 22, 2019  1:04 PM

## 2019-10-22 NOTE — OP NOTE
Procedure Date: 10/22/2019      SURGEON:  Candelaria Santos DPM      PREOPERATIVE DIAGNOSIS:  Chronic osteomyelitis, left third toe.      POSTOPERATIVE DIAGNOSIS:  Chronic osteomyelitis, left third toe.      PROCEDURE:  Amputation, left third toe.      COMPLICATIONS:  None.      DESCRIPTION OF PROCEDURE:  The patient was brought to the operating room and placed on the operating room table in the supine position with an IV intact for intravenous sedation.  The patient received 1 gram vancomycin IVPB prior to the start of surgery.  A pneumatic ankle tourniquet was placed about the patient's left ankle.  The left third ray was injected with a total of 10 mL of a 50:50 mixture of 1% lidocaine plain and 0.5% Marcaine plain in a 50:50 mixture for local anesthesia.  The left foot was prepped and draped in the usual aseptic technique.  The left foot was exsanguinated, and hemostasis was maintained via inflation of the pneumatic ankle tourniquet to 250 mmHg.      Attention was then directed to the left third toe, which was noted to be grossly edematous with erythema and copious serous drainage from a deep ulceration distally that probed to the level of bone.  Two semi-elliptical skin incisions were created about the base of the third digit just distal to the metatarsophalangeal joint, encircling the base of this toe.  These incisions were made straight down to the level of bone.  Sharp dissection was made proximally, hugging the third proximal phalanx to the level of the third metatarsophalangeal joint capsule.  The capsular tissues were incised circumferentially and the entire third toe was removed in toto.  It was noted that the third distal phalanx was quite necrotic with no normal bone structure.  Upon amputation of the third toe, the third metatarsal head was inspected and was noted to be of normal appearance with no necrosis.  The surgical site was copiously irrigated with normal saline.  Any small vessels noted  within the surgical site were cauterized.  A small amount of tendon material in the amputation site was also excised.  The subcutaneous tissue was lightly reapproximated to cover over the third metatarsal head, using 3-0 Vicryl.  The skin was closed over the amputation site using 2-0 nylon and 3-0 nylon via horizontal mattress and simple suture technique.  Triple antibiotic ointment was applied to the surgical site followed by application of Adaptic and a dry sterile, moderately compressive postoperative dressing.  At this time, the tourniquet was released and immediate warmth and perfusion was noted to return to all remaining digits of the left foot.        The patient appeared to tolerate well the above stated anesthesia and procedure, and was transported to post-anesthesia recovery with vital signs stable.  She will follow up at my office on Friday, 10/25/2019.         JOSE DAVID GARCÍA DPM             D: 10/22/2019   T: 10/22/2019   MT: MOOSE      Name:     DERRELL BENNETT   MRN:      [id number]        Account:        [id number]   :      1960           Procedure Date: 10/22/2019      Document: [id number]

## 2019-10-22 NOTE — DISCHARGE INSTRUCTIONS
Same Day Surgery Discharge Instructions for  Sedation and General Anesthesia       It's not unusual to feel dizzy, light-headed or faint for up to 24 hours after surgery or while taking pain medication.  If you have these symptoms: sit for a few minutes before standing and have someone assist you when you get up to walk or use the bathroom.      You should rest and relax for the next 24 hours. We recommend you make arrangements to have an adult stay with you for at least 24 hours after your discharge.  Avoid hazardous and strenuous activity.      DO NOT DRIVE any vehicle or operate mechanical equipment for 24 hours following the end of your surgery.  Even though you may feel normal, your reactions may be affected by the medication you have received.      Do not drink alcoholic beverages for 24 hours following surgery.       Slowly progress to your regular diet as you feel able. It's not unusual to feel nauseated and/or vomit after receiving anesthesia.  If you develop these symptoms, drink clear liquids (apple juice, ginger ale, broth, 7-up, etc. ) until you feel better.  If your nausea and vomiting persists for 24 hours, please notify your surgeon.        All narcotic pain medications, along with inactivity and anesthesia, can cause constipation. Drinking plenty of liquids and increasing fiber intake will help.      For any questions of a medical nature, call your surgeon.      Do not make important decisions for 24 hours.      If you had general anesthesia, you may have a sore throat for a couple of days related to the breathing tube used during surgery.  You may use Cepacol lozenges to help with this discomfort.  If it worsens or if you develop a fever, contact your surgeon.     If you feel your pain is not well managed with the pain medications prescribed by your surgeon, please contact your surgeon's office to let them know so they can address your concerns.     Reasons to contact your surgeon:    1. Signs of  possible infection: Check your incision daily for redness, swelling, warmth, red streaks or foul drainage.   2. Elevated temperature.  3. Pain not controlled with pain medication and/or rest.   4. Uncontrolled nausea or vomiting.  5. Any questions or concerns.      Wheeled Knee Walker Rental Facilities    Depending on your post-operative needs, a wheeled knee walker may be a good option for you instead of crutches or a traditional walker.  Some considerations are: the length of time your weight bearing is limited, upper body strength, stairs and/or your need for mobility. You might need a prescription from your surgeon. Insurance may not cover the cost.  The supplier should alert you to the available coverage or check with your insurance carrier.        Location: Store Name: Brands  They Carry: Phone Number:    [facility] [address], [place] [phone]   [facility] [phone]   [place] [employer] [facility] [employer] [place]  [phone]   [facility] [place]  [phone]   [place] [employer] [facility], [facility] [phone]   [place] [facility] [phone]   [place] [employer] [facility], Drive [phone]   [facility], [place]  [phone]   [facility] [phone]   [facility] [place], [place]  [phone]   [facility]  [facility] [phone]   [facility] [place]  [phone]   [facility] [phone]         **If you have questions or concerns about your procedure,   call Dr Antonio Santos [phone]**

## 2019-10-22 NOTE — ANESTHESIA CARE TRANSFER NOTE
Patient: Carlene Agee    Procedure(s):  AMPUTATION, LEFT THIRD TOE    Diagnosis: Osteomyelitis of left foot, unspecified type (H) [M86.9]  Diagnosis Additional Information: No value filed.    Anesthesia Type:   MAC     Note:  Airway :Face Mask  Patient transferred to:PACU  Comments: At end of procedure, spontaneous respirations, patient alert to voice, able to follow commands. Oxygen via facemask at 8 liters per minute to PACU. Oxygen tubing connected to wall O2 in PACU, SpO2, NiBP, and EKG monitors and alarms on and functioning, Leah Hugger warmer connected to patient gown, report on patient's clinical status given to PACU RN, RN questions answered.Handoff Report: Identifed the Patient, Identified the Reponsible Provider, Reviewed the pertinent medical history, Discussed the surgical course, Reviewed Intra-OP anesthesia mangement and issues during anesthesia, Set expectations for post-procedure period and Allowed opportunity for questions and acknowledgement of understanding      Vitals: (Last set prior to Anesthesia Care Transfer)    CRNA VITALS  10/22/2019 0803 - 10/22/2019 0837      10/22/2019             NIBP:     NIBP Mean:     Resp Rate (set):                 Electronically Signed By: MATTHIAS Alonzo CRNA  October 22, 2019  8:37 AM

## 2019-10-22 NOTE — ANESTHESIA PREPROCEDURE EVALUATION
"Anesthesia Pre-Procedure Evaluation    Patient: Carlene Agee   MRN: [id number] : 1960          Preoperative Diagnosis: Osteomyelitis of left foot, unspecified type (H) [M86.9]    Procedure(s):  AMPUTATION, LEFT THIRD TOE    No past medical history on file.  No past surgical history on file.    Anesthesia Evaluation     .             ROS/MED HX    ENT/Pulmonary:       Neurologic:     (+)neuropathy - Bilateral lower extremities;,     Cardiovascular:     (+) Dyslipidemia, hypertension----. : . . . :. .       METS/Exercise Tolerance:     Hematologic:         Musculoskeletal:         GI/Hepatic:         Renal/Genitourinary:         Endo:     (+) type II DM Not using insulin Diabetic complications: neuropathy, Obesity, .      Psychiatric:         Infectious Disease:         Malignancy:         Other:                          Physical Exam  Normal systems: cardiovascular and pulmonary    Airway   Mallampati: II  TM distance: >3 FB  Neck ROM: full    Dental   (+) upper dentures    Cardiovascular       Pulmonary             No results found for: WBC, HGB, HCT, PLT, CRP, SED, NA, POTASSIUM, CHLORIDE, CO2, BUN, CR, GLC, YANCY, PHOS, MAG, ALBUMIN, PROTTOTAL, ALT, AST, GGT, ALKPHOS, BILITOTAL, BILIDIRECT, LIPASE, AMYLASE, MALDONADO, PTT, INR, FIBR, TSH, T4, T3, HCG, HCGS, CKTOTAL, CKMB, TROPN    Preop Vitals  BP Readings from Last 3 Encounters:   10/22/19 (!) 164/80    Pulse Readings from Last 3 Encounters:   10/22/19 77      Resp Readings from Last 3 Encounters:   10/22/19 16    SpO2 Readings from Last 3 Encounters:   10/22/19 97%      Temp Readings from Last 1 Encounters:   10/22/19 35.9  C (96.6  F) (Temporal)    Ht Readings from Last 1 Encounters:   10/22/19 1.676 m (5' 6\")      Wt Readings from Last 1 Encounters:   10/22/19 99.8 kg (220 lb)    Estimated body mass index is 35.51 kg/m  as calculated from the following:    Height as of this encounter: 1.676 m (5' 6\").    Weight as of this encounter: 99.8 kg (220 lb). "       Anesthesia Plan      History & Physical Review  History and physical reviewed and following examination; no interval change.    ASA Status:  2 .    NPO Status:  > 8 hours    Plan for MAC   PONV prophylaxis:  Ondansetron (or other 5HT-3)       Postoperative Care  Postoperative pain management:  IV analgesics.      Consents  Anesthetic plan, risks, benefits and alternatives discussed with:  Patient..                 CL RODRIGUES MD

## 2019-10-22 NOTE — BRIEF OP NOTE
[facility] Brief Operative Note    Pre-operative diagnosis: Osteomyelitis of left foot, unspecified type (H) [M86.9]   Post-operative diagnosis Chronic osteomyelitis left third toe   Procedure: Procedure(s):  AMPUTATION, LEFT THIRD TOE   Surgeon(s): Surgeon(s) and Role:     * Candelaria Simmons DPM - Primary   Estimated blood loss: 5 mL    Specimens: ID Type Source Tests Collected by Time Destination   1 : LEFT THIRD TOE SWAB Tissue Toe ANAEROBIC BACTERIAL CULTURE, GRAM STAIN, TISSUE CULTURE AEROBIC BACTERIAL Cnadelaria Simmons DPM 10/22/2019  7:58 AM    A : LEFT THIRD TOE  Tissue Toe SURGICAL PATHOLOGY EXAM Candelaria Simmons DPM 10/22/2019  8:01 AM       Findings: Necrotic third distal phalanx, with deep ulcer and copious serosanguinous drainage.  The 3rd metatarsal head is normal in appearance.

## 2019-10-24 LAB — COPATH REPORT: NORMAL

## 2019-10-27 LAB
BACTERIA SPEC CULT: ABNORMAL
Lab: ABNORMAL
SPECIMEN SOURCE: ABNORMAL

## 2019-11-01 LAB
BACTERIA SPEC CULT: ABNORMAL
Lab: ABNORMAL
SPECIMEN SOURCE: ABNORMAL

## 2019-11-25 ENCOUNTER — RECORDS - HEALTHEAST (OUTPATIENT)
Dept: ADMINISTRATIVE | Facility: OTHER | Age: 59
End: 2019-11-25

## 2020-01-29 ENCOUNTER — COMMUNICATION - HEALTHEAST (OUTPATIENT)
Dept: INTERNAL MEDICINE | Facility: CLINIC | Age: 60
End: 2020-01-29

## 2020-01-29 DIAGNOSIS — E11.9 TYPE 2 DIABETES, HBA1C GOAL < 8% (H): ICD-10-CM

## 2020-02-03 ENCOUNTER — COMMUNICATION - HEALTHEAST (OUTPATIENT)
Dept: INTERNAL MEDICINE | Facility: CLINIC | Age: 60
End: 2020-02-03

## 2020-05-03 ENCOUNTER — COMMUNICATION - HEALTHEAST (OUTPATIENT)
Dept: INTERNAL MEDICINE | Facility: CLINIC | Age: 60
End: 2020-05-03

## 2020-05-03 DIAGNOSIS — E11.9 TYPE 2 DIABETES, HBA1C GOAL < 8% (H): ICD-10-CM

## 2020-06-06 NOTE — OR NURSING
PNDS met, po per I&O sheet. Pt dressed, up in recliner and transported to Phase 2.    WEAKNESS/DIZZINESS

## 2020-07-07 ENCOUNTER — COMMUNICATION - HEALTHEAST (OUTPATIENT)
Dept: INTERNAL MEDICINE | Facility: CLINIC | Age: 60
End: 2020-07-07

## 2020-07-07 DIAGNOSIS — I10 HTN (HYPERTENSION): ICD-10-CM

## 2020-07-15 ENCOUNTER — COMMUNICATION - HEALTHEAST (OUTPATIENT)
Dept: SCHEDULING | Facility: CLINIC | Age: 60
End: 2020-07-15

## 2020-07-15 ENCOUNTER — COMMUNICATION - HEALTHEAST (OUTPATIENT)
Dept: INTERNAL MEDICINE | Facility: CLINIC | Age: 60
End: 2020-07-15

## 2020-07-15 DIAGNOSIS — Z96.653 STATUS POST TOTAL BILATERAL KNEE REPLACEMENT: ICD-10-CM

## 2020-07-28 ENCOUNTER — COMMUNICATION - HEALTHEAST (OUTPATIENT)
Dept: INTERNAL MEDICINE | Facility: CLINIC | Age: 60
End: 2020-07-28

## 2020-07-28 DIAGNOSIS — I10 ESSENTIAL HYPERTENSION: ICD-10-CM

## 2020-08-07 ENCOUNTER — COMMUNICATION - HEALTHEAST (OUTPATIENT)
Dept: SCHEDULING | Facility: CLINIC | Age: 60
End: 2020-08-07

## 2020-08-10 ENCOUNTER — COMMUNICATION - HEALTHEAST (OUTPATIENT)
Dept: INTERNAL MEDICINE | Facility: CLINIC | Age: 60
End: 2020-08-10

## 2020-08-10 ENCOUNTER — OFFICE VISIT - HEALTHEAST (OUTPATIENT)
Dept: INTERNAL MEDICINE | Facility: CLINIC | Age: 60
End: 2020-08-10

## 2020-08-10 DIAGNOSIS — I10 ESSENTIAL HYPERTENSION: ICD-10-CM

## 2020-08-10 DIAGNOSIS — M54.2 NECK PAIN: ICD-10-CM

## 2020-08-10 DIAGNOSIS — E11.9 TYPE 2 DIABETES, HBA1C GOAL < 8% (H): ICD-10-CM

## 2020-08-10 LAB
ALBUMIN SERPL-MCNC: 3.6 G/DL (ref 3.5–5)
ALP SERPL-CCNC: 55 U/L (ref 45–120)
ALT SERPL W P-5'-P-CCNC: 20 U/L (ref 0–45)
ANION GAP SERPL CALCULATED.3IONS-SCNC: 13 MMOL/L (ref 5–18)
AST SERPL W P-5'-P-CCNC: 11 U/L (ref 0–40)
ATRIAL RATE - MUSE: 76 BPM
BILIRUB SERPL-MCNC: 0.3 MG/DL (ref 0–1)
BUN SERPL-MCNC: 12 MG/DL (ref 8–22)
CALCIUM SERPL-MCNC: 9.2 MG/DL (ref 8.5–10.5)
CHLORIDE BLD-SCNC: 101 MMOL/L (ref 98–107)
CO2 SERPL-SCNC: 23 MMOL/L (ref 22–31)
CREAT SERPL-MCNC: 0.71 MG/DL (ref 0.6–1.1)
DIASTOLIC BLOOD PRESSURE - MUSE: NORMAL
ERYTHROCYTE [DISTWIDTH] IN BLOOD BY AUTOMATED COUNT: 12.6 % (ref 11–14.5)
GFR SERPL CREATININE-BSD FRML MDRD: >60 ML/MIN/1.73M2
GLUCOSE BLD-MCNC: 221 MG/DL (ref 70–125)
HBA1C MFR BLD: 8.8 %
HCT VFR BLD AUTO: 36 % (ref 35–47)
HGB BLD-MCNC: 12.1 G/DL (ref 12–16)
INTERPRETATION ECG - MUSE: NORMAL
MCH RBC QN AUTO: 30.3 PG (ref 27–34)
MCHC RBC AUTO-ENTMCNC: 33.5 G/DL (ref 32–36)
MCV RBC AUTO: 90 FL (ref 80–100)
P AXIS - MUSE: 37 DEGREES
PLATELET # BLD AUTO: 282 THOU/UL (ref 140–440)
PMV BLD AUTO: 7.6 FL (ref 7–10)
POTASSIUM BLD-SCNC: 3.8 MMOL/L (ref 3.5–5)
PR INTERVAL - MUSE: 154 MS
PROT SERPL-MCNC: 7 G/DL (ref 6–8)
QRS DURATION - MUSE: 86 MS
QT - MUSE: 396 MS
QTC - MUSE: 445 MS
R AXIS - MUSE: -1 DEGREES
RBC # BLD AUTO: 3.99 MILL/UL (ref 3.8–5.4)
SODIUM SERPL-SCNC: 137 MMOL/L (ref 136–145)
SYSTOLIC BLOOD PRESSURE - MUSE: NORMAL
T AXIS - MUSE: 69 DEGREES
VENTRICULAR RATE- MUSE: 76 BPM
WBC: 6.6 THOU/UL (ref 4–11)

## 2020-08-10 ASSESSMENT — MIFFLIN-ST. JEOR: SCORE: 1617.3

## 2020-08-11 ENCOUNTER — COMMUNICATION - HEALTHEAST (OUTPATIENT)
Dept: INTERNAL MEDICINE | Facility: CLINIC | Age: 60
End: 2020-08-11

## 2020-08-13 ENCOUNTER — COMMUNICATION - HEALTHEAST (OUTPATIENT)
Dept: INTERNAL MEDICINE | Facility: CLINIC | Age: 60
End: 2020-08-13

## 2020-08-13 DIAGNOSIS — E11.9 TYPE 2 DIABETES, HBA1C GOAL < 8% (H): ICD-10-CM

## 2020-08-28 ENCOUNTER — OFFICE VISIT - HEALTHEAST (OUTPATIENT)
Dept: INTERNAL MEDICINE | Facility: CLINIC | Age: 60
End: 2020-08-28

## 2020-08-28 DIAGNOSIS — I10 HTN (HYPERTENSION): ICD-10-CM

## 2020-08-28 DIAGNOSIS — Z12.11 SCREEN FOR COLON CANCER: ICD-10-CM

## 2020-08-28 DIAGNOSIS — Z12.31 VISIT FOR SCREENING MAMMOGRAM: ICD-10-CM

## 2020-08-28 DIAGNOSIS — E11.9 TYPE 2 DIABETES, HBA1C GOAL < 8% (H): ICD-10-CM

## 2020-08-28 ASSESSMENT — MIFFLIN-ST. JEOR: SCORE: 1608.23

## 2020-11-21 ENCOUNTER — RECORDS - HEALTHEAST (OUTPATIENT)
Dept: ADMINISTRATIVE | Facility: OTHER | Age: 60
End: 2020-11-21

## 2020-11-30 ENCOUNTER — OFFICE VISIT - HEALTHEAST (OUTPATIENT)
Dept: INTERNAL MEDICINE | Facility: CLINIC | Age: 60
End: 2020-11-30

## 2020-11-30 DIAGNOSIS — Z12.11 SCREEN FOR COLON CANCER: ICD-10-CM

## 2020-11-30 DIAGNOSIS — E11.9 TYPE 2 DIABETES, HBA1C GOAL < 8% (H): ICD-10-CM

## 2020-11-30 DIAGNOSIS — Z12.31 VISIT FOR SCREENING MAMMOGRAM: ICD-10-CM

## 2020-11-30 DIAGNOSIS — Z00.00 ROUTINE GENERAL MEDICAL EXAMINATION AT A HEALTH CARE FACILITY: ICD-10-CM

## 2020-11-30 DIAGNOSIS — I10 ESSENTIAL HYPERTENSION: ICD-10-CM

## 2020-11-30 ASSESSMENT — MIFFLIN-ST. JEOR: SCORE: 1615.02

## 2020-12-14 ENCOUNTER — AMBULATORY - HEALTHEAST (OUTPATIENT)
Dept: LAB | Facility: CLINIC | Age: 60
End: 2020-12-14

## 2020-12-14 DIAGNOSIS — I10 ESSENTIAL HYPERTENSION: ICD-10-CM

## 2020-12-14 DIAGNOSIS — E11.9 TYPE 2 DIABETES, HBA1C GOAL < 8% (H): ICD-10-CM

## 2020-12-14 LAB
ALBUMIN SERPL-MCNC: 3.8 G/DL (ref 3.5–5)
ALP SERPL-CCNC: 62 U/L (ref 45–120)
ALT SERPL W P-5'-P-CCNC: 34 U/L (ref 0–45)
ANION GAP SERPL CALCULATED.3IONS-SCNC: 11 MMOL/L (ref 5–18)
AST SERPL W P-5'-P-CCNC: 23 U/L (ref 0–40)
BILIRUB SERPL-MCNC: 0.5 MG/DL (ref 0–1)
BUN SERPL-MCNC: 12 MG/DL (ref 8–22)
CALCIUM SERPL-MCNC: 9 MG/DL (ref 8.5–10.5)
CHLORIDE BLD-SCNC: 104 MMOL/L (ref 98–107)
CHOLEST SERPL-MCNC: 125 MG/DL
CO2 SERPL-SCNC: 24 MMOL/L (ref 22–31)
CREAT SERPL-MCNC: 0.64 MG/DL (ref 0.6–1.1)
CREAT UR-MCNC: 53.4 MG/DL
ERYTHROCYTE [DISTWIDTH] IN BLOOD BY AUTOMATED COUNT: 12.7 % (ref 11–14.5)
FASTING STATUS PATIENT QL REPORTED: YES
GFR SERPL CREATININE-BSD FRML MDRD: >60 ML/MIN/1.73M2
GLUCOSE BLD-MCNC: 135 MG/DL (ref 70–125)
HBA1C MFR BLD: 9.2 %
HCT VFR BLD AUTO: 35.9 % (ref 35–47)
HDLC SERPL-MCNC: 42 MG/DL
HGB BLD-MCNC: 12.1 G/DL (ref 12–16)
LDLC SERPL CALC-MCNC: 65 MG/DL
MCH RBC QN AUTO: 30.5 PG (ref 27–34)
MCHC RBC AUTO-ENTMCNC: 33.8 G/DL (ref 32–36)
MCV RBC AUTO: 90 FL (ref 80–100)
MICROALBUMIN UR-MCNC: 1.04 MG/DL (ref 0–1.99)
MICROALBUMIN/CREAT UR: 19.5 MG/G
PLATELET # BLD AUTO: 256 THOU/UL (ref 140–440)
PMV BLD AUTO: 7.2 FL (ref 7–10)
POTASSIUM BLD-SCNC: 3.8 MMOL/L (ref 3.5–5)
PROT SERPL-MCNC: 7 G/DL (ref 6–8)
RBC # BLD AUTO: 3.97 MILL/UL (ref 3.8–5.4)
SODIUM SERPL-SCNC: 139 MMOL/L (ref 136–145)
TRIGL SERPL-MCNC: 91 MG/DL
TSH SERPL DL<=0.005 MIU/L-ACNC: 1.04 UIU/ML (ref 0.3–5)
WBC: 6.8 THOU/UL (ref 4–11)

## 2020-12-15 ENCOUNTER — COMMUNICATION - HEALTHEAST (OUTPATIENT)
Dept: INTERNAL MEDICINE | Facility: CLINIC | Age: 60
End: 2020-12-15

## 2021-02-28 ENCOUNTER — COMMUNICATION - HEALTHEAST (OUTPATIENT)
Dept: INTERNAL MEDICINE | Facility: CLINIC | Age: 61
End: 2021-02-28

## 2021-02-28 DIAGNOSIS — G62.9 NEUROPATHY: ICD-10-CM

## 2021-03-29 ENCOUNTER — COMMUNICATION - HEALTHEAST (OUTPATIENT)
Dept: FAMILY MEDICINE | Facility: CLINIC | Age: 61
End: 2021-03-29

## 2021-03-29 ENCOUNTER — OFFICE VISIT - HEALTHEAST (OUTPATIENT)
Dept: FAMILY MEDICINE | Facility: CLINIC | Age: 61
End: 2021-03-29

## 2021-03-29 DIAGNOSIS — E11.9 TYPE 2 DIABETES, HBA1C GOAL < 8% (H): ICD-10-CM

## 2021-03-29 DIAGNOSIS — E11.65 TYPE 2 DIABETES MELLITUS WITH HYPERGLYCEMIA, WITHOUT LONG-TERM CURRENT USE OF INSULIN (H): ICD-10-CM

## 2021-03-29 DIAGNOSIS — E66.01 MORBID OBESITY (H): ICD-10-CM

## 2021-03-29 DIAGNOSIS — I10 ESSENTIAL HYPERTENSION: ICD-10-CM

## 2021-03-29 LAB — HBA1C MFR BLD: 8.3 %

## 2021-03-31 ENCOUNTER — COMMUNICATION - HEALTHEAST (OUTPATIENT)
Dept: FAMILY MEDICINE | Facility: CLINIC | Age: 61
End: 2021-03-31

## 2021-04-26 ENCOUNTER — COMMUNICATION - HEALTHEAST (OUTPATIENT)
Dept: FAMILY MEDICINE | Facility: CLINIC | Age: 61
End: 2021-04-26

## 2021-04-26 ENCOUNTER — OFFICE VISIT - HEALTHEAST (OUTPATIENT)
Dept: FAMILY MEDICINE | Facility: CLINIC | Age: 61
End: 2021-04-26

## 2021-04-26 DIAGNOSIS — I10 ESSENTIAL HYPERTENSION: ICD-10-CM

## 2021-04-26 DIAGNOSIS — E11.9 TYPE 2 DIABETES, HBA1C GOAL < 7% (H): ICD-10-CM

## 2021-05-19 ENCOUNTER — RECORDS - HEALTHEAST (OUTPATIENT)
Dept: ADMINISTRATIVE | Facility: OTHER | Age: 61
End: 2021-05-19

## 2021-05-25 ENCOUNTER — RECORDS - HEALTHEAST (OUTPATIENT)
Dept: ADMINISTRATIVE | Facility: CLINIC | Age: 61
End: 2021-05-25

## 2021-05-25 ENCOUNTER — OFFICE VISIT - HEALTHEAST (OUTPATIENT)
Dept: FAMILY MEDICINE | Facility: CLINIC | Age: 61
End: 2021-05-25

## 2021-05-25 DIAGNOSIS — R01.1 SYSTOLIC MURMUR: ICD-10-CM

## 2021-05-25 DIAGNOSIS — I10 ESSENTIAL HYPERTENSION: ICD-10-CM

## 2021-05-25 ASSESSMENT — MIFFLIN-ST. JEOR: SCORE: 1622.11

## 2021-05-27 NOTE — TELEPHONE ENCOUNTER
RN cannot approve Refill Request    RN can NOT refill this medication PCP messaged that patient is overdue for Labs and Office Visit. Last office visit: 11/21/2017 Otf Morley MD Last Physical: Visit date not found Last MTM visit: Visit date not found Last visit same specialty: 11/21/2017 Otf Morley MD.  Next visit within 3 mo: Visit date not found  Next physical within 3 mo: Visit date not found      Halley MOULTON Brunilda, [facility] Triage/Med Refill 3/27/2019    Requested Prescriptions   Pending Prescriptions Disp Refills     metoprolol succinate (TOPROL-XL) 50 MG 24 hr tablet [Pharmacy Med Name: METOPROLOL ER SUCCINATE 50MG TABS] 30 tablet 0     Sig: TAKE 1 TABLET BY MOUTH DAILY(REPLACES ATENOLOL DURING BACK ORDER)    Beta-Blockers Refill Protocol Failed - 3/27/2019  1:23 PM       Failed - PCP or prescribing provider visit in past 12 months or next 3 months    Last office visit with prescriber/PCP: 11/21/2017 Otf Morley MD OR same dept: Visit date not found OR same specialty: 11/21/2017 Otf Morley MD  Last physical: Visit date not found Last MTM visit: Visit date not found   Next visit within 3 mo: Visit date not found  Next physical within 3 mo: Visit date not found  Prescriber OR PCP: Otf Morley MD  Last diagnosis associated with med order: 1. HTN (hypertension)  - metoprolol succinate (TOPROL-XL) 50 MG 24 hr tablet [Pharmacy Med Name: METOPROLOL ER SUCCINATE 50MG TABS]; TAKE 1 TABLET BY MOUTH DAILY(REPLACES ATENOLOL DURING BACK ORDER)  Dispense: 30 tablet; Refill: 0    If protocol passes may refill for 12 months if within 3 months of last provider visit (or a total of 15 months).            Failed - Blood pressure filed in past 12 months    BP Readings from Last 1 Encounters:   11/21/17 120/78

## 2021-05-27 NOTE — TELEPHONE ENCOUNTER
RN cannot approve Refill Request    RN can NOT refill this medication PCP messaged that patient is overdue for Office Visit.       Alexa Moses, [facility] Triage/Med Refill 4/9/2019    Requested Prescriptions   Pending Prescriptions Disp Refills     atorvastatin (LIPITOR) 10 MG tablet [Pharmacy Med Name: ATORVASTATIN 10MG TABLETS] 90 tablet 3     Sig: TAKE 1 TABLET(10 MG) BY MOUTH DAILY       Statins Refill Protocol (Hmg CoA Reductase Inhibitors) Failed - 4/8/2019 11:40 AM        Failed - PCP or prescribing provider visit in past 12 months      Last office visit with prescriber/PCP: 11/21/2017 Otf Morley MD OR same dept: Visit date not found OR same specialty: 11/21/2017 Otf Morley MD  Last physical: Visit date not found Last MTM visit: Visit date not found   Next visit within 3 mo: Visit date not found  Next physical within 3 mo: Visit date not found  Prescriber OR PCP: Otf Morley MD  Last diagnosis associated with med order: 1. Type 2 diabetes, HbA1C goal < 8% (H)  - atorvastatin (LIPITOR) 10 MG tablet [Pharmacy Med Name: ATORVASTATIN 10MG TABLETS]; TAKE 1 TABLET(10 MG) BY MOUTH DAILY  Dispense: 90 tablet; Refill: 0    If protocol passes may refill for 12 months if within 3 months of last provider visit (or a total of 15 months).

## 2021-05-28 ENCOUNTER — RECORDS - HEALTHEAST (OUTPATIENT)
Dept: ADMINISTRATIVE | Facility: CLINIC | Age: 61
End: 2021-05-28

## 2021-05-28 NOTE — TELEPHONE ENCOUNTER
RN cannot approve Refill Request    RN can NOT refill this medication overdue for office visits and/or labs.    Charly Liang, [facility] Triage/Med Refill 4/22/2019    Requested Prescriptions   Pending Prescriptions Disp Refills     lisinopril-hydrochlorothiazide (PRINZIDE,ZESTORETIC) 10-12.5 mg per tablet [Pharmacy Med Name: LISINOPRIL-HCTZ 10/12.5MG TABLETS] 90 tablet 0     Sig: TAKE 1 TABLET BY MOUTH DAILY       Diuretics/Combination Diuretics Refill Protocol  Failed - 4/18/2019  9:33 PM        Failed - Visit with PCP or prescribing provider visit in past 12 months     Last office visit with prescriber/PCP: 11/21/2017 Otf Morley MD OR same dept: Visit date not found OR same specialty: 11/21/2017 Otf Morley MD  Last physical: Visit date not found Last MTM visit: Visit date not found   Next visit within 3 mo: Visit date not found  Next physical within 3 mo: Visit date not found  Prescriber OR PCP: Otf Morley MD  Last diagnosis associated with med order: 1. Essential hypertension  - lisinopril-hydrochlorothiazide (PRINZIDE,ZESTORETIC) 10-12.5 mg per tablet [Pharmacy Med Name: LISINOPRIL-HCTZ 10/12.5MG TABLETS]; TAKE 1 TABLET BY MOUTH DAILY  Dispense: 90 tablet; Refill: 0    If protocol passes may refill for 12 months if within 3 months of last provider visit (or a total of 15 months).             Failed - Serum Potassium in past 12 months      No results found for: LN-POTASSIUM          Failed - Serum Sodium in past 12 months      No results found for: LN-SODIUM          Failed - Blood pressure on file in past 12 months     BP Readings from Last 1 Encounters:   11/21/17 120/78             Failed - Serum Creatinine in past 12 months      Creatinine   Date Value Ref Range Status   07/10/2017 0.74 0.60 - 1.10 mg/dL Final

## 2021-05-28 NOTE — TELEPHONE ENCOUNTER
Left message to call back for: Carlene  Information to relay to patient:  L/M that she is overdue for an appt. Left number [phone] to call back and schedule.

## 2021-05-28 NOTE — TELEPHONE ENCOUNTER
RN cannot approve Refill Request    RN can NOT refill this medication Protocol failed and NO refill given. Last office visit: 11/21/2017 Otf Morley MD Last Physical: Visit date not found Last MTM visit: Visit date not found Last visit same specialty: 11/21/2017 Otf Morley MD.  Next visit within 3 mo: Visit date not found  Next physical within 3 mo: Visit date not found      Joana Zapata, [facility] Triage/Med Refill 5/5/2019    Requested Prescriptions   Pending Prescriptions Disp Refills     metoprolol succinate (TOPROL-XL) 50 MG 24 hr tablet [Pharmacy Med Name: METOPROLOL ER SUCCINATE 50MG TABS] 30 tablet 0     Sig: TAKE 1 TABLET BY MOUTH DAILY(REPLACES ATENOLOL DURING BACK ORDER)       Beta-Blockers Refill Protocol Failed - 5/4/2019 10:19 PM        Failed - PCP or prescribing provider visit in past 12 months or next 3 months     Last office visit with prescriber/PCP: 11/21/2017 Otf Morley MD OR same dept: Visit date not found OR same specialty: 11/21/2017 Otf Morley MD  Last physical: Visit date not found Last MTM visit: Visit date not found   Next visit within 3 mo: Visit date not found  Next physical within 3 mo: Visit date not found  Prescriber OR PCP: Otf Morley MD  Last diagnosis associated with med order: 1. HTN (hypertension)  - metoprolol succinate (TOPROL-XL) 50 MG 24 hr tablet [Pharmacy Med Name: METOPROLOL ER SUCCINATE 50MG TABS]; TAKE 1 TABLET BY MOUTH DAILY(REPLACES ATENOLOL DURING BACK ORDER)  Dispense: 30 tablet; Refill: 0    If protocol passes may refill for 12 months if within 3 months of last provider visit (or a total of 15 months).             Failed - Blood pressure filed in past 12 months     BP Readings from Last 1 Encounters:   11/21/17 120/78

## 2021-05-28 NOTE — TELEPHONE ENCOUNTER
RN cannot approve Refill Request    RN can NOT refill this medication PCP messaged that patient is overdue for Office Visit.       Alexa Moses, [facility] Triage/Med Refill 5/20/2019    Requested Prescriptions   Pending Prescriptions Disp Refills     gabapentin (NEURONTIN) 300 MG capsule [Pharmacy Med Name: GABAPENTIN 300MG CAPSULES] 90 capsule 0     Sig: TAKE 1 CAPSULE(300 MG) BY MOUTH THREE TIMES DAILY. FOLLOW-UP APPOINTMENT REQUIRED FOR FUTURE REFILLS       Gabapentin/Levetiracetam/Tiagabine Refill Protocol  Failed - 5/19/2019  2:05 PM        Failed - PCP or prescribing provider visit in past 12 months or next 3 months     Last office visit with prescriber/PCP: Visit date not found OR same dept: Visit date not found OR same specialty: 11/21/2017 Otf Morley MD  Last physical: Visit date not found Last MTM visit: Visit date not found   Next visit within 3 mo: Visit date not found  Next physical within 3 mo: Visit date not found  Prescriber OR PCP: Celestine Zelaya MD  Last diagnosis associated with med order: 1. Neuropathy (H)  - gabapentin (NEURONTIN) 300 MG capsule [Pharmacy Med Name: GABAPENTIN 300MG CAPSULES]; TAKE 1 CAPSULE(300 MG) BY MOUTH THREE TIMES DAILY. FOLLOW-UP APPOINTMENT REQUIRED FOR FUTURE REFILLS  Dispense: 90 capsule; Refill: 0    If protocol passes may refill for 12 months if within 3 months of last provider visit (or a total of 15 months).

## 2021-05-29 ENCOUNTER — RECORDS - HEALTHEAST (OUTPATIENT)
Dept: ADMINISTRATIVE | Facility: CLINIC | Age: 61
End: 2021-05-29

## 2021-05-29 NOTE — PROGRESS NOTES
Office Visit - Follow up    Carlene Agee   59 y.o. female    Date of Visit: 6/10/2019    Chief Complaint   Patient presents with     Diabetes     not fasting     Hypertension     Orders Request     Mammogram       Subjective: Sheila is here for follow-up of type 2 diabetes well controlled with current regimen comprehensive labs pending.  Does not monitor blood sugars at home although A1c's have been stable.  Intermittent symptoms of mild neuropathy controlled with intermittent use of gabapentin.  Due for follow-up mammography screening this will be ordered.    History of hypertension well controlled history of lumbar disc degeneration stable        ROS: A comprehensive review of systems was performed and was otherwise negative except as mentioned above.     Exam  Head and neck normal diabetic foot exam negative lungs clear heart normal abdomen benign   /82 (Patient Site: Right Arm, Patient Position: Sitting, Cuff Size: Adult Regular)   Pulse 88   Wt 217 lb 0.6 oz (98.4 kg)   SpO2 99%   Breastfeeding? No   BMI 36.12 kg/m      Assessment and Plan  Here for routine follow-up comprehensive labs scheduled blood pressure is well controlled    Current regimen reviewed and reconciled will review labs when available    Carlene was seen today for diabetes, hypertension and orders request.    Diagnoses and all orders for this visit:    Visit for screening mammogram  -     Mammo Screening Bilateral; Future  -     HM2(CBC w/o Differential); Future  -     Comprehensive Metabolic Panel; Future  -     Lipid Cascade; Future  -     Microalbumin, Random Urine  -     Glycosylated Hemoglobin A1c  -     HM2(CBC w/o Differential)  -     Comprehensive Metabolic Panel  -     Lipid Cascade    Type 2 diabetes, HbA1C goal < 8% (H)  -     HM2(CBC w/o Differential); Future  -     Comprehensive Metabolic Panel; Future  -     Lipid Cascade; Future  -     Microalbumin, Random Urine  -     Glycosylated Hemoglobin A1c  -     HM2(CBC  w/o Differential)  -     Comprehensive Metabolic Panel  -     Lipid Cascade    Essential hypertension  -     HM2(CBC w/o Differential); Future  -     Comprehensive Metabolic Panel; Future  -     Lipid Cascade; Future  -     Microalbumin, Random Urine  -     Glycosylated Hemoglobin A1c  -     HM2(CBC w/o Differential)  -     Comprehensive Metabolic Panel  -     Lipid Cascade    Lumbar Disc Degeneration  -     HM2(CBC w/o Differential); Future  -     Comprehensive Metabolic Panel; Future  -     Lipid Cascade; Future  -     Microalbumin, Random Urine  -     Glycosylated Hemoglobin A1c  -     HM2(CBC w/o Differential)  -     Comprehensive Metabolic Panel  -     Lipid Cascade    Other mononeuropathy  -     HM2(CBC w/o Differential); Future  -     Comprehensive Metabolic Panel; Future  -     Lipid Cascade; Future  -     Microalbumin, Random Urine  -     Glycosylated Hemoglobin A1c  -     HM2(CBC w/o Differential)  -     Comprehensive Metabolic Panel  -     Lipid Cascade          Time: total time spent with the patient was 25 minutes of which >50% was spent in counseling and coordination of care        No Known Allergies    Medications :  Prior to Admission medications    Medication Sig Start Date End Date Taking? Authorizing Provider   atorvastatin (LIPITOR) 10 MG tablet TAKE 1 TABLET(10 MG) BY MOUTH DAILY 4/9/19  Yes Otf Morley MD   blood glucose test strips Use to test once daily 5/21/18  Yes Otf Morley MD   blood-glucose meter Misc Use to test once daily 5/21/18  Yes Otf Morley MD   gabapentin (NEURONTIN) 300 MG capsule TAKE 1 CAPSULE(300 MG) BY MOUTH THREE TIMES DAILY. FOLLOW-UP APPOINTMENT REQUIRED FOR FUTURE REFILLS 5/20/19  Yes Otf Morley MD   generic lancets (ACCU-CHEK MULTICLIX LANCET) Dispense brand per patient's insurance at pharmacy discretion. 5/21/18  Yes Otf Morley MD   lisinopril-hydrochlorothiazide (PRINZIDE,ZESTORETIC) 10-12.5 mg per tablet TAKE 1 TABLET BY MOUTH DAILY  4/22/19  Yes Otf Morley MD   metFORMIN (GLUCOPHAGE-XR) 500 MG 24 hr tablet TAKE 2 TABLETS BY MOUTH TWICE DAILY AT 6 AM AND AT 4 PM 4/29/19  Yes Otf Morley MD   metoprolol succinate (TOPROL-XL) 50 MG 24 hr tablet TAKE 1 TABLET BY MOUTH DAILY(REPLACES ATENOLOL DURING BACK ORDER) 5/5/19  Yes Otf Morley MD        Past Medical History: No past medical history on file.    Past Surgical History: No past surgical history on file.    Social History:   Social History     Socioeconomic History     Marital status:      Spouse name: Not on file     Number of children: Not on file     Years of education: Not on file     Highest education level: Not on file   Occupational History     Not on file   Social Needs     Financial resource strain: Not on file     Food insecurity:     Worry: Not on file     Inability: Not on file     Transportation needs:     Medical: Not on file     Non-medical: Not on file   Tobacco Use     Smoking status: Never Smoker     Smokeless tobacco: Never Used   Substance and Sexual Activity     Alcohol use: Not on file     Drug use: Not on file     Sexual activity: Not on file   Lifestyle     Physical activity:     Days per week: Not on file     Minutes per session: Not on file     Stress: Not on file   Relationships     Social connections:     Talks on phone: Not on file     Gets together: Not on file     Attends Taoist service: Not on file     Active member of club or organization: Not on file     Attends meetings of clubs or organizations: Not on file     Relationship status: Not on file     Intimate partner violence:     Fear of current or ex partner: Not on file     Emotionally abused: Not on file     Physically abused: Not on file     Forced sexual activity: Not on file   Other Topics Concern     Not on file   Social History Narrative     Not on file       Family History: No family history on file.      Otf Morley MD

## 2021-05-30 NOTE — TELEPHONE ENCOUNTER
Refill Approved    Rx renewed per Medication Renewal Policy. Medication was last renewed on 5/20/19.    Alexa Moses, [facility] Triage/Med Refill 7/17/2019     Requested Prescriptions   Pending Prescriptions Disp Refills     gabapentin (NEURONTIN) 300 MG capsule [Pharmacy Med Name: GABAPENTIN 300MG CAPSULES] 90 capsule 0     Sig: TAKE 1 CAPSULE(300 MG) BY MOUTH THREE TIMES DAILY. FOLLOW-UP APPOINTMENT REQUIRED FOR FUTURE REFILLS       Gabapentin/Levetiracetam/Tiagabine Refill Protocol  Passed - 7/17/2019 10:11 AM        Passed - PCP or prescribing provider visit in past 12 months or next 3 months     Last office visit with prescriber/PCP: 6/10/2019 Otf Morley MD OR same dept: 6/10/2019 Otf Morley MD OR same specialty: 6/10/2019 Otf Morley MD  Last physical: Visit date not found Last MTM visit: Visit date not found   Next visit within 3 mo: Visit date not found  Next physical within 3 mo: Visit date not found  Prescriber OR PCP: Otf Morley MD  Last diagnosis associated with med order: 1. Neuropathy  - gabapentin (NEURONTIN) 300 MG capsule [Pharmacy Med Name: GABAPENTIN 300MG CAPSULES]; TAKE 1 CAPSULE(300 MG) BY MOUTH THREE TIMES DAILY. FOLLOW-UP APPOINTMENT REQUIRED FOR FUTURE REFILLS  Dispense: 90 capsule; Refill: 0    If protocol passes may refill for 12 months if within 3 months of last provider visit (or a total of 15 months).

## 2021-05-30 NOTE — TELEPHONE ENCOUNTER
Refill Approved    Rx renewed per Medication Renewal Policy. Medication was last renewed on 4/22/19.    Alexa Moses, [facility] Triage/Med Refill 7/23/2019     Requested Prescriptions   Pending Prescriptions Disp Refills     lisinopril-hydrochlorothiazide (PRINZIDE,ZESTORETIC) 10-12.5 mg per tablet [Pharmacy Med Name: LISINOPRIL-HCTZ 10/12.5MG TABLETS] 90 tablet 0     Sig: TAKE 1 TABLET BY MOUTH DAILY       Diuretics/Combination Diuretics Refill Protocol  Passed - 7/23/2019  7:32 AM        Passed - Visit with PCP or prescribing provider visit in past 12 months     Last office visit with prescriber/PCP: 6/10/2019 Otf Morley MD OR same dept: 6/10/2019 Otf Morley MD OR same specialty: 6/10/2019 Otf Morley MD  Last physical: Visit date not found Last MTM visit: Visit date not found   Next visit within 3 mo: Visit date not found  Next physical within 3 mo: Visit date not found  Prescriber OR PCP: Otf Morley MD  Last diagnosis associated with med order: 1. Essential hypertension  - lisinopril-hydrochlorothiazide (PRINZIDE,ZESTORETIC) 10-12.5 mg per tablet [Pharmacy Med Name: LISINOPRIL-HCTZ 10/12.5MG TABLETS]; TAKE 1 TABLET BY MOUTH DAILY  Dispense: 90 tablet; Refill: 0    If protocol passes may refill for 12 months if within 3 months of last provider visit (or a total of 15 months).             Passed - Serum Potassium in past 12 months      Lab Results   Component Value Date    Potassium 3.8 06/10/2019             Passed - Serum Sodium in past 12 months      Lab Results   Component Value Date    Sodium 139 06/10/2019             Passed - Blood pressure on file in past 12 months     BP Readings from Last 1 Encounters:   06/10/19 134/82             Passed - Serum Creatinine in past 12 months      Creatinine   Date Value Ref Range Status   06/10/2019 0.69 0.60 - 1.10 mg/dL Final

## 2021-05-30 NOTE — TELEPHONE ENCOUNTER
Refill Approved    Rx renewed per Medication Renewal Policy. Medication was last renewed on 4/29/19, last OV 6/10/19.    Eliz Orozco, [facility] Triage/Med Refill 7/7/2019     Requested Prescriptions   Pending Prescriptions Disp Refills     metFORMIN (GLUCOPHAGE-XR) 500 MG 24 hr tablet [Pharmacy Med Name: METFORMIN ER 500MG 24HR TABS] 120 tablet 0     Sig: TAKE 2 TABLETS BY MOUTH TWICE DAILY AT 6 AM AND AT 4 PM       Metformin Refill Protocol Passed - 7/6/2019  3:48 AM        Passed - Blood pressure in last 12 months     BP Readings from Last 1 Encounters:   06/10/19 134/82             Passed - LFT or AST or ALT in last 12 months     Albumin   Date Value Ref Range Status   06/10/2019 3.8 3.5 - 5.0 g/dL Final     Bilirubin, Total   Date Value Ref Range Status   06/10/2019 0.4 0.0 - 1.0 mg/dL Final     Alkaline Phosphatase   Date Value Ref Range Status   06/10/2019 56 45 - 120 U/L Final     AST   Date Value Ref Range Status   06/10/2019 11 0 - 40 U/L Final     ALT   Date Value Ref Range Status   06/10/2019 22 0 - 45 U/L Final     Protein, Total   Date Value Ref Range Status   06/10/2019 7.5 6.0 - 8.0 g/dL Final                Passed - GFR or Serum Creatinine in last 6 months     GFR MDRD Non Af Amer   Date Value Ref Range Status   06/10/2019 >60 >60 mL/min/1.73m2 Final     GFR MDRD Af Amer   Date Value Ref Range Status   06/10/2019 >60 >60 mL/min/1.73m2 Final             Passed - Visit with PCP or prescribing provider visit in last 6 months or next 3 months     Last office visit with prescriber/PCP: 6/10/2019 OR same dept: 6/10/2019 Otf Morley MD OR same specialty: 6/10/2019 Otf Morley MD Last physical: Visit date not found Last MTM visit: Visit date not found         Next appt within 3 mo: Visit date not found  Next physical within 3 mo: Visit date not found  Prescriber OR PCP: Otf Morley MD  Last diagnosis associated with med order: 1. Type 2 diabetes, HbA1C goal < 8% (H)  - metFORMIN  (GLUCOPHAGE-XR) 500 MG 24 hr tablet [Pharmacy Med Name: METFORMIN ER 500MG 24HR TABS]; TAKE 2 TABLETS BY MOUTH TWICE DAILY AT 6 AM AND AT 4 PM  Dispense: 120 tablet; Refill: 0     If protocol passes may refill for 12 months if within 3 months of last provider visit (or a total of 15 months).           Passed - A1C in last 6 months     Hemoglobin A1c   Date Value Ref Range Status   06/10/2019 7.6 (H) 3.5 - 6.0 % Final               Passed - Microalbumin in last year      Microalbumin, Random Urine   Date Value Ref Range Status   06/10/2019 1.16 0.00 - 1.99 mg/dL Final

## 2021-05-30 NOTE — TELEPHONE ENCOUNTER
Refill Approved    Rx renewed per Medication Renewal Policy. Medication was last renewed on 6/13/19 .    Gemma Anders, [facility] Triage/Med Refill 7/14/2019     Requested Prescriptions   Pending Prescriptions Disp Refills     metoprolol succinate (TOPROL-XL) 50 MG 24 hr tablet [Pharmacy Med Name: METOPROLOL ER SUCCINATE 50MG TABS] 30 tablet 0     Sig: TAKE 1 TABLET BY MOUTH DAILY(REPLACES ATENOLOL DURING BACK ORDER)       Beta-Blockers Refill Protocol Passed - 7/14/2019 11:09 AM        Passed - PCP or prescribing provider visit in past 12 months or next 3 months     Last office visit with prescriber/PCP: 6/10/2019 Otf Morley MD OR same dept: 6/10/2019 Otf Morley MD OR same specialty: 6/10/2019 Otf Morley MD  Last physical: Visit date not found Last MTM visit: Visit date not found   Next visit within 3 mo: Visit date not found  Next physical within 3 mo: Visit date not found  Prescriber OR PCP: Otf Morley MD  Last diagnosis associated with med order: 1. HTN (hypertension)  - metoprolol succinate (TOPROL-XL) 50 MG 24 hr tablet [Pharmacy Med Name: METOPROLOL ER SUCCINATE 50MG TABS]; TAKE 1 TABLET BY MOUTH DAILY(REPLACES ATENOLOL DURING BACK ORDER)  Dispense: 30 tablet; Refill: 0    If protocol passes may refill for 12 months if within 3 months of last provider visit (or a total of 15 months).             Passed - Blood pressure filed in past 12 months     BP Readings from Last 1 Encounters:   06/10/19 134/82

## 2021-05-31 ENCOUNTER — RECORDS - HEALTHEAST (OUTPATIENT)
Dept: ADMINISTRATIVE | Facility: CLINIC | Age: 61
End: 2021-05-31

## 2021-05-31 VITALS — BODY MASS INDEX: 35.99 KG/M2 | HEIGHT: 65 IN | WEIGHT: 216 LBS

## 2021-05-31 VITALS — HEIGHT: 65 IN | BODY MASS INDEX: 35.49 KG/M2 | WEIGHT: 213 LBS

## 2021-05-31 VITALS — WEIGHT: 219 LBS | HEIGHT: 65 IN | BODY MASS INDEX: 36.49 KG/M2

## 2021-05-31 VITALS — HEIGHT: 65 IN | BODY MASS INDEX: 35.99 KG/M2 | WEIGHT: 216 LBS

## 2021-05-31 VITALS — BODY MASS INDEX: 36.28 KG/M2 | WEIGHT: 218 LBS

## 2021-05-31 VITALS — WEIGHT: 218 LBS | BODY MASS INDEX: 36.28 KG/M2

## 2021-06-01 VITALS — BODY MASS INDEX: 36.94 KG/M2 | WEIGHT: 222 LBS

## 2021-06-02 NOTE — TELEPHONE ENCOUNTER
Refill Approved    Rx renewed per Medication Renewal Policy. Medication was last renewed on 5/21/18.    Gemma Anders, [facility] Triage/Med Refill 10/20/2019     Requested Prescriptions   Pending Prescriptions Disp Refills     ACCU-CHEK GUIDE strips [Pharmacy Med Name: ACCU-CHEK GUIDE TEST STRIPS 50] 100 strip 0     Sig: USE TO TEST ONCE DAILY       Diabetic Supplies Refill Protocol Passed - 10/20/2019  1:19 PM        Passed - Visit with PCP or prescribing provider visit in last 6 months     Last office visit with prescriber/PCP: 6/10/2019 Otf Morley MD OR same dept: 6/10/2019 Otf Morley MD OR same specialty: 6/10/2019 Otf Morley MD  Last physical: Visit date not found Last MTM visit: Visit date not found   Next visit within 3 mo: Visit date not found  Next physical within 3 mo: Visit date not found  Prescriber OR PCP: Otf Morley MD  Last diagnosis associated with med order: 1. Type 2 diabetes, HbA1C goal < 8% (H)  - ACCU-CHEK GUIDE strips [Pharmacy Med Name: ACCU-CHEK GUIDE TEST STRIPS 50]; USE TO TEST ONCE DAILY  Dispense: 100 strip; Refill: 0    If protocol passes may refill for 12 months if within 3 months of last provider visit (or a total of 15 months).             Passed - A1C in last 6 months     Hemoglobin A1c   Date Value Ref Range Status   06/10/2019 7.6 (H) 3.5 - 6.0 % Final

## 2021-06-02 NOTE — PROGRESS NOTES
Office Visit - Follow up    Carlene Agee   59 y.o. female    Date of Visit: 10/21/2019    Chief Complaint   Patient presents with     Pre-op Exam     Toe amputation left foot third toe        Subjective: Carlene Agee is a 59-year-old patient with type 2 diabetes who is seen for preoperative evaluation prior to amputation of middle toe left foot for infected osteomyelitis    She has been followed for some time by Ileana Santos D.P.M. for a nonhealing ulcer.  Patient was called by Dr. Santos's office as recent testing suggested osteomyelitis and amputation is planned.  See notes by Dr. Santos regarding surgical indications and plans    Patient has not to my knowledge had definitive vascular studies to document presence or absence of significant peripheral vascular disease.  These will be scheduled in the near future.    Patient has type 2 diabetes reasonably well controlled with metformin although most recent A1c was higher than desired at 7.6.  She has started home glucose monitoring    Sugars have been higher than optimal but generally have been less than 180.  She has had no other secondary complications of diabetes to her knowledge.    Past history includes bilateral knee replacement in addition to hysterectomy.  History of hypertension controlled with combination of metoprolol lisinopril and hydrochlorothiazide.  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    Review of systems otherwise negative        ROS: A comprehensive review of systems was performed and was otherwise negative except as mentioned above.     Exam  Alert and oriented vital signs as noted repeat blood pressure 128/74 pulse regular 70 EENT grossly negative head and neck unremarkable lungs clear heart normal rhythm regular no murmur abdomen benign extremities no edema  "her left middle toe is heavily bandaged pulses are diminished skin color is fair to good.  Neuro exam negative.   /80 (Patient Site: Right Arm, Patient Position: Sitting, Cuff Size: Adult Regular)   Pulse 94   Temp 98.1  F (36.7  C) (Oral)   Ht 5' 5\" (1.651 m)   Wt 221 lb 6.4 oz (100.4 kg)   SpO2 97%   BMI 36.84 kg/m      Assessment and Plan  Type 2 diabetes stable control adequate but suboptimal    Suspected osteomyelitis middle toe left foot amputation planned by Dr. Santos    Suspect peripheral vascular disease will need further work-up she will return to this office for further evaluation and to schedule vascular referral versus invasive studies and possible angiography    Diabetic peripheral neuropathy stable    Hypertension suboptimally controlled    Preoperative EKG reveals sinus rhythm and is unremarkable mild suggestion of LVH    Preoperative labs pending these will be available on the morning and should be reviewed by the surgical team    Patient is stable for planned surgery pending review of laboratory studies    Carlene was seen today for pre-op exam.    Diagnoses and all orders for this visit:    Subacute osteomyelitis of left foot (H)  -     Electrocardiogram Perform and Read  -     Glycosylated Hemoglobin A1c  -     HM2(CBC w/o Differential); Future  -     Comprehensive Metabolic Panel; Future  -     C-Reactive Protein(CRP)  -     Erythrocyte Sedimentation Rate  -     HM2(CBC w/o Differential)  -     Comprehensive Metabolic Panel    Essential hypertension  -     Electrocardiogram Perform and Read  -     Glycosylated Hemoglobin A1c  -     HM2(CBC w/o Differential); Future  -     Comprehensive Metabolic Panel; Future  -     HM2(CBC w/o Differential)  -     Comprehensive Metabolic Panel    Type 2 diabetes, HbA1C goal < 8% (H)  -     Electrocardiogram Perform and Read  -     Glycosylated Hemoglobin A1c  -     HM2(CBC w/o Differential); Future  -     Comprehensive Metabolic Panel; Future  -     " HM2(CBC w/o Differential)  -     Comprehensive Metabolic Panel    Osteoarthritis Of The Knee  -     Electrocardiogram Perform and Read  -     Glycosylated Hemoglobin A1c  -     HM2(CBC w/o Differential); Future  -     Comprehensive Metabolic Panel; Future  -     HM2(CBC w/o Differential)  -     Comprehensive Metabolic Panel    Lumbar Disc Degeneration  -     Electrocardiogram Perform and Read  -     Glycosylated Hemoglobin A1c  -     HM2(CBC w/o Differential); Future  -     Comprehensive Metabolic Panel; Future  -     HM2(CBC w/o Differential)  -     Comprehensive Metabolic Panel    Preop general physical exam  -     Electrocardiogram Perform and Read  -     Glycosylated Hemoglobin A1c  -     HM2(CBC w/o Differential); Future  -     Comprehensive Metabolic Panel; Future  -     HM2(CBC w/o Differential)  -     Comprehensive Metabolic Panel          Time: total time spent with the patient was 40 minutes of which >50% was spent in counseling and coordination of care        No Known Allergies    Medications :  Prior to Admission medications    Medication Sig Start Date End Date Taking? Authorizing Provider   ACCU-CHEK GUIDE strips USE TO TEST ONCE DAILY 10/20/19  Yes Otf Morley MD   atorvastatin (LIPITOR) 10 MG tablet TAKE 1 TABLET(10 MG) BY MOUTH DAILY 4/9/19  Yes Otf Morley MD   blood-glucose meter Misc Use to test once daily 5/21/18  Yes Otf Morley MD   gabapentin (NEURONTIN) 300 MG capsule TAKE 1 CAPSULE(300 MG) BY MOUTH THREE TIMES DAILY. FOLLOW-UP APPOINTMENT REQUIRED FOR FUTURE REFILLS 7/17/19  Yes Otf Morley MD   generic lancets (ACCU-CHEK MULTICLIX LANCET) Dispense brand per patient's insurance at pharmacy discretion. 5/21/18  Yes Otf Morley MD   lisinopril-hydrochlorothiazide (PRINZIDE,ZESTORETIC) 10-12.5 mg per tablet TAKE 1 TABLET BY MOUTH DAILY 7/23/19  Yes Otf Morley MD   metFORMIN (GLUCOPHAGE-XR) 500 MG 24 hr tablet TAKE 2 TABLETS BY MOUTH TWICE DAILY AT 6 AM AND AT 4  PM 4/29/19  Yes Otf Morley MD   metFORMIN (GLUCOPHAGE-XR) 500 MG 24 hr tablet TAKE 2 TABLETS BY MOUTH TWICE DAILY AT 6 AM AND AT 4 PM 7/7/19   Otf Morley MD   metoprolol succinate (TOPROL-XL) 50 MG 24 hr tablet TAKE 1 TABLET BY MOUTH DAILY(REPLACES ATENOLOL DURING BACK ORDER) 7/14/19   Otf Morley MD        Past Medical History: No past medical history on file.    Past Surgical History: No past surgical history on file.    Social History:   Social History     Socioeconomic History     Marital status:      Spouse name: Not on file     Number of children: Not on file     Years of education: Not on file     Highest education level: Not on file   Occupational History     Not on file   Social Needs     Financial resource strain: Not on file     Food insecurity:     Worry: Not on file     Inability: Not on file     Transportation needs:     Medical: Not on file     Non-medical: Not on file   Tobacco Use     Smoking status: Never Smoker     Smokeless tobacco: Never Used   Substance and Sexual Activity     Alcohol use: Not on file     Drug use: Not on file     Sexual activity: Not on file   Lifestyle     Physical activity:     Days per week: Not on file     Minutes per session: Not on file     Stress: Not on file   Relationships     Social connections:     Talks on phone: Not on file     Gets together: Not on file     Attends Alevism service: Not on file     Active member of club or organization: Not on file     Attends meetings of clubs or organizations: Not on file     Relationship status: Not on file     Intimate partner violence:     Fear of current or ex partner: Not on file     Emotionally abused: Not on file     Physically abused: Not on file     Forced sexual activity: Not on file   Other Topics Concern     Not on file   Social History Narrative     Not on file       Family History: No family history on file.      Otf Morley MD

## 2021-06-02 NOTE — TELEPHONE ENCOUNTER
Left message to call back for: Carlene  Information to relay to patient:  Wondering if pt can see Dr Morley at 1120 on Monday instead of Dr Mayorga at [phone]. We need time for blood work to come back for a Tuesday morning surgery.

## 2021-06-02 NOTE — TELEPHONE ENCOUNTER
Who is calling:  Myrtle with [facility], [phone]  Reason for Call:    Myrtle is requesting the results of Pre-op exam and lab results be faxed to clinic today.  Patient has surgery tomorrow, 10/22/19.  Fax# [phone]  Date of last appointment with primary care: N/A  Okay to leave a detailed message: Yes

## 2021-06-03 VITALS — BODY MASS INDEX: 36.12 KG/M2 | WEIGHT: 217.04 LBS

## 2021-06-03 VITALS
WEIGHT: 221.4 LBS | OXYGEN SATURATION: 97 % | TEMPERATURE: 98.1 F | HEIGHT: 65 IN | SYSTOLIC BLOOD PRESSURE: 154 MMHG | HEART RATE: 94 BPM | DIASTOLIC BLOOD PRESSURE: 80 MMHG | BODY MASS INDEX: 36.89 KG/M2

## 2021-06-04 VITALS
OXYGEN SATURATION: 97 % | SYSTOLIC BLOOD PRESSURE: 178 MMHG | WEIGHT: 232 LBS | HEART RATE: 96 BPM | DIASTOLIC BLOOD PRESSURE: 84 MMHG | BODY MASS INDEX: 38.65 KG/M2 | HEIGHT: 65 IN

## 2021-06-04 VITALS
BODY MASS INDEX: 38.99 KG/M2 | HEART RATE: 77 BPM | SYSTOLIC BLOOD PRESSURE: 168 MMHG | WEIGHT: 234 LBS | OXYGEN SATURATION: 98 % | HEIGHT: 65 IN | DIASTOLIC BLOOD PRESSURE: 90 MMHG

## 2021-06-05 VITALS
WEIGHT: 238.56 LBS | BODY MASS INDEX: 40.95 KG/M2 | OXYGEN SATURATION: 96 % | HEART RATE: 79 BPM | SYSTOLIC BLOOD PRESSURE: 160 MMHG | TEMPERATURE: 98.1 F | DIASTOLIC BLOOD PRESSURE: 86 MMHG

## 2021-06-05 VITALS
WEIGHT: 237 LBS | HEART RATE: 94 BPM | HEIGHT: 64 IN | BODY MASS INDEX: 40.46 KG/M2 | DIASTOLIC BLOOD PRESSURE: 80 MMHG | SYSTOLIC BLOOD PRESSURE: 148 MMHG

## 2021-06-05 VITALS
BODY MASS INDEX: 42.05 KG/M2 | HEART RATE: 84 BPM | OXYGEN SATURATION: 94 % | WEIGHT: 245 LBS | DIASTOLIC BLOOD PRESSURE: 80 MMHG | SYSTOLIC BLOOD PRESSURE: 152 MMHG

## 2021-06-05 NOTE — TELEPHONE ENCOUNTER
Former patient of Peyman & has not established care with another provider.  Please assign refill request to covering provider per Clinic standard process.  Refill Approved    Rx renewed per Medication Renewal Policy. Medication was last renewed on 10/20/19.    Alexa Moses, [facility] Triage/Med Refill 1/29/2020     Requested Prescriptions   Pending Prescriptions Disp Refills     ACCU-CHEK GUIDE strips [Pharmacy Med Name: ACCU-CHEK GUIDE TEST STRIPS 100S] 100 strip 0     Sig: USE TO TEST ONCE DAILY       Diabetic Supplies Refill Protocol Passed - 1/29/2020  1:08 PM        Passed - Visit with PCP or prescribing provider visit in last 6 months     Last office visit with prescriber/PCP: 6/10/2019 Otf Morley MD OR same dept: 6/10/2019 Otf Morley MD OR same specialty: 6/10/2019 Otf Morley MD  Last physical: 10/21/2019 Last MTM visit: Visit date not found   Next visit within 3 mo: Visit date not found  Next physical within 3 mo: Visit date not found  Prescriber OR PCP: Otf Morley MD  Last diagnosis associated with med order: 1. Type 2 diabetes, HbA1C goal < 8% (H)  - ACCU-CHEK GUIDE strips [Pharmacy Med Name: ACCU-CHEK GUIDE TEST STRIPS 100S]; USE TO TEST ONCE DAILY  Dispense: 100 strip; Refill: 0    If protocol passes may refill for 12 months if within 3 months of last provider visit (or a total of 15 months).             Passed - A1C in last 6 months     Hemoglobin A1c   Date Value Ref Range Status   10/21/2019 8.8 (H) 3.5 - 6.0 % Final

## 2021-06-07 NOTE — TELEPHONE ENCOUNTER
Former patient of Peyman & has not established care with another provider.  Please assign refill request to covering provider per Clinic standard process.  Refill Approved    Rx renewed per Medication Renewal Policy. Medication was last renewed on 4/9/19.    Alexa Moses, [facility] Triage/Med Refill 5/5/2020     Requested Prescriptions   Pending Prescriptions Disp Refills     atorvastatin (LIPITOR) 10 MG tablet [Pharmacy Med Name: ATORVASTATIN 10MG TABLETS] 90 tablet 3     Sig: TAKE 1 TABLET(10 MG) BY MOUTH DAILY       Statins Refill Protocol (Hmg CoA Reductase Inhibitors) Passed - 5/3/2020  1:50 PM        Passed - PCP or prescribing provider visit in past 12 months      Last office visit with prescriber/PCP: 6/10/2019 Otf Morley MD OR same dept: 6/10/2019 Otf Morley MD OR same specialty: 6/10/2019 Otf Morley MD  Last physical: 10/21/2019 Last MTM visit: Visit date not found   Next visit within 3 mo: Visit date not found  Next physical within 3 mo: Visit date not found  Prescriber OR PCP: Otf Morley MD  Last diagnosis associated with med order: 1. Type 2 diabetes, HbA1C goal < 8% (H)  - atorvastatin (LIPITOR) 10 MG tablet [Pharmacy Med Name: ATORVASTATIN 10MG TABLETS]; TAKE 1 TABLET(10 MG) BY MOUTH DAILY  Dispense: 90 tablet; Refill: 3    If protocol passes may refill for 12 months if within 3 months of last provider visit (or a total of 15 months).

## 2021-06-09 ENCOUNTER — OFFICE VISIT - HEALTHEAST (OUTPATIENT)
Dept: EDUCATION SERVICES | Facility: CLINIC | Age: 61
End: 2021-06-09

## 2021-06-09 DIAGNOSIS — E11.9 TYPE 2 DIABETES, HBA1C GOAL < 7% (H): ICD-10-CM

## 2021-06-09 NOTE — TELEPHONE ENCOUNTER
Medication Request  Medication name: Amoxicillin   Requested Pharmacy: [facility] # [id number]  Reason for request: Patient take for dental work, had a cap fall off and will be seeing the dentist in the am of 7/16/20.  If possible  Could refills be placed in the event repeat visits needed for this issue.Please advise. Needs ASAP today please.  When did you use medication last?:  unknown  Patient offered appointment:No  Okay to leave a detailed message: yes

## 2021-06-09 NOTE — TELEPHONE ENCOUNTER
Left message to call back for: Carlene  Information to relay to patient:      1) I do not see amoxiclliin on her past medication list-what does she need it for?   (I.e. s/p MAULIK, TKA??)     2) Please let her know that Dr. Morley is retired (see if she will schedule establish care visit if she is going to continue care here)

## 2021-06-09 NOTE — TELEPHONE ENCOUNTER
Refill Approved    Rx renewed per Medication Renewal Policy. Medication was last renewed on 7/14/19.    Kirti Barclay, [facility] Triage/Med Refill 7/9/2020     Requested Prescriptions   Pending Prescriptions Disp Refills     metoprolol succinate (TOPROL-XL) 50 MG 24 hr tablet [Pharmacy Med Name: METOPROLOL ER SUCCINATE 50MG TABS] 90 tablet 3     Sig: TAKE 1 TABLET BY MOUTH DAILY       Beta-Blockers Refill Protocol Passed - 7/7/2020  7:15 AM        Passed - PCP or prescribing provider visit in past 12 months or next 3 months     Last office visit with prescriber/PCP: 6/10/2019 Otf Morley MD OR same dept: Visit date not found OR same specialty: 6/10/2019 Otf Morley MD  Last physical: 10/21/2019 Last MTM visit: Visit date not found   Next visit within 3 mo: Visit date not found  Next physical within 3 mo: Visit date not found  Prescriber OR PCP: Otf Morley MD  Last diagnosis associated with med order: 1. HTN (hypertension)  - metoprolol succinate (TOPROL-XL) 50 MG 24 hr tablet [Pharmacy Med Name: METOPROLOL ER SUCCINATE 50MG TABS]; TAKE 1 TABLET BY MOUTH DAILY  Dispense: 90 tablet; Refill: 3    If protocol passes may refill for 12 months if within 3 months of last provider visit (or a total of 15 months).             Passed - Blood pressure filed in past 12 months     BP Readings from Last 1 Encounters:   10/21/19 154/80

## 2021-06-09 NOTE — TELEPHONE ENCOUNTER
Patient Returning Call  Reason for call:  Message from clinic  Information relayed to patient: Left message to call back for: Carlene  Information to relay to patient:       1) I do not see amoxiclliin on her past medication list-what does she need it for?   (I.e. s/p MAULIK, TKA??)      2) Please let her know that Dr. Morley is retired (see if she will schedule establish care visit if she is going to continue care here)     Patient has additional questions:  No  If YES, what are your questions/concerns:  Patient has a history of bilateral total knee replacements.   Please send today as she has a dental appointment tomorrow at 2:00 p.m.  Patient is aware of Dr. Morley's correction and will set up appointment to establish care with a new provider at the same clinic.  Okay to leave a detailed message?: Yes

## 2021-06-09 NOTE — TELEPHONE ENCOUNTER
Okay to refill this prescription.  Amoxicillin 500 mg.  4 tablets p.o. 1 hour before dentist.  Prescribed 4 tablets with 2 refills

## 2021-06-10 NOTE — PROGRESS NOTES
Office Visit - Follow Up   Carlene Agee   60 y.o. female    Date of Visit: 8/10/2020    Chief Complaint   Patient presents with     shoulder issues     tingling inleft shoulder goes up to neck and head        Assessment and Plan   1. Neck pain  Neck pain that appears to be musculoskeletal based on history and exam.  EKG unremarkable.  Nothing associated with exertion.  Began 1 week after some heavy lifting.  Seems to be improving over the last week.  Will apply heat and use Tylenol.  - Electrocardiogram Perform and Read    2. Type 2 diabetes, HbA1C goal < 8% (H)  Recheck A1c and refill her statin  - atorvastatin (LIPITOR) 10 MG tablet; Take 1 tablet (10 mg total) by mouth daily.  Dispense: 90 tablet; Refill: 0  - Glycosylated Hemoglobin A1c    3. Essential hypertension  We will get her separate prescriptions of lisinopril and HCTZ as combination is not available and she has been out of medication for the past week  - lisinopriL (PRINIVIL,ZESTRIL) 10 MG tablet; Take 1 tablet (10 mg total) by mouth daily.  Dispense: 30 tablet; Refill: 11  - hydroCHLOROthiazide (HYDRODIURIL) 12.5 MG tablet; Take 1 tablet (12.5 mg total) by mouth daily.  Dispense: 30 tablet; Refill: 11  - Comprehensive Metabolic Panel  - HM2(CBC w/o Differential)    Urging her to establish care with new PCP.  Recommending Dr. Angela Cervantes or Dr. Sosa Mcrae     History of Present Illness   This 60 y.o. old woman with type 2 diabetes and hypertension here with some vague discomfort on the left side of her neck radiating into her shoulder and side of face.  Symptoms present for the past several weeks.  Began a week after lifting some heavy boxes while moving.  Pain is a 6/10 at its worse and usually a less severe.  Localized to left side of neck and will radiate into her shoulder but not down her arm and is also felt into the posterior lateral portion of her head.  No chest pain.  No associated dyspnea or diaphoresis or dizziness.  No severe  "headache.  Seems to be worse when moving her neck.  Usually felt at rest including when lying down.  Not related to exertion although rather sedentary.  She has been off of her lisinopril/HCTZ this past week as it is on \"backorder\".  Unclear why substitution was not given.  Over 6 months since her last A1c was drawn.  Sugars look historically not well controlled.    Review of Systems: No fevers or chills.  No abdominal pain.  No nausea.     Medications, Allergies and Problem List   Patient Active Problem List   Diagnosis     Synovial Cyst     Obesity     Essential Hypertension     Osteoarthritis Of The Knee     Lumbar Disc Degeneration     Hyperglycemia     Type 2 diabetes, HbA1C goal < 8% (H)     H/O: hysterectomy       She has no past surgical history on file.    No Known Allergies    Current Outpatient Medications   Medication Sig Dispense Refill     ACCU-CHEK GUIDE strips USE TO TEST ONCE DAILY 100 strip 0     amoxicillin (AMOXIL) 500 MG capsule Take 4 capsules by mouth one hour prior to dental work 4 capsule 2     blood-glucose meter Misc Use to test once daily 1 each 0     gabapentin (NEURONTIN) 300 MG capsule TAKE 1 CAPSULE(300 MG) BY MOUTH THREE TIMES DAILY. FOLLOW-UP APPOINTMENT REQUIRED FOR FUTURE REFILLS 270 capsule 3     generic lancets (ACCU-CHEK MULTICLIX LANCET) Dispense brand per patient's insurance at pharmacy discretion. 100 each 3     metFORMIN (GLUCOPHAGE-XR) 500 MG 24 hr tablet TAKE 2 TABLETS BY MOUTH TWICE DAILY AT 6 AM AND AT 4  tablet 0     metoprolol succinate (TOPROL-XL) 50 MG 24 hr tablet TAKE 1 TABLET BY MOUTH DAILY 90 tablet 0     atorvastatin (LIPITOR) 10 MG tablet Take 1 tablet (10 mg total) by mouth daily. 90 tablet 0     hydroCHLOROthiazide (HYDRODIURIL) 12.5 MG tablet Take 1 tablet (12.5 mg total) by mouth daily. 30 tablet 11     lisinopriL (PRINIVIL,ZESTRIL) 10 MG tablet Take 1 tablet (10 mg total) by mouth daily. 30 tablet 11     lisinopriL-hydrochlorothiazide " "(PRINZIDE,ZESTORETIC) 10-12.5 mg per tablet TAKE 1 TABLET BY MOUTH DAILY 90 tablet 3     No current facility-administered medications for this visit.         Physical Exam   General Appearance:   Well-appearing middle-aged woman    /90 (Patient Site: Left Arm, Patient Position: Sitting, Cuff Size: Adult Large)   Pulse 77   Ht 5' 5\" (1.651 m)   Wt (!) 234 lb (106.1 kg)   SpO2 98%   BMI 38.94 kg/m      HEENT: Normal.  Exam is unremarkable.    Neck without lymphadenopathy or masses.  No thyromegaly.  Minimal reproducible tenderness when palpating over left side of neck musculature.  No masses are felt.  Normal range of motion cervical spine.  Respiratory: Normal respiratory effort.  Lungs are clear with no rales or wheezes.  Heart: Regular rate and rhythm without murmurs, rubs, or gallops.  No carotid bruits.  Extremities: No peripheral edema.  Neurologic: Grossly nonfocal      EKG with normal sinus rhythm with LVH but no ST or T wave abnormalities     Additional Information   Social History     Tobacco Use     Smoking status: Never Smoker     Smokeless tobacco: Never Used   Substance Use Topics     Alcohol use: Not on file     Drug use: Not on file          Celestine Zelaya MD  "

## 2021-06-10 NOTE — TELEPHONE ENCOUNTER
Former patient of Peyman & has not established care with another provider.  Please assign refill request to covering provider per Clinic standard process.  RN cannot approve Refill Request    RN can NOT refill this medication Protocol failed and NO refill given. Last office visit: 6/10/2019 Otf Morley MD Last Physical: 10/21/2019 Last MTM visit: Visit date not found Last visit same specialty: 8/10/2020 Celestine Zelaya MD.  Next visit within 3 mo: Visit date not found  Next physical within 3 mo: Visit date not found      Alexa Moses, [facility] Triage/Med Refill 8/13/2020    Requested Prescriptions   Pending Prescriptions Disp Refills     metFORMIN (GLUCOPHAGE-XR) 500 MG 24 hr tablet [Pharmacy Med Name: METFORMIN ER 500MG 24HR TABS] 360 tablet 0     Sig: TAKE 2 TABLETS BY MOUTH TWICE DAILY AT 6 AM AND AT 4 PM       Metformin Refill Protocol Failed - 8/13/2020  3:54 AM        Failed - Microalbumin in last year      Microalbumin, Random Urine   Date Value Ref Range Status   06/10/2019 1.16 0.00 - 1.99 mg/dL Final                  Passed - Blood pressure in last 12 months     BP Readings from Last 1 Encounters:   08/10/20 168/90             Passed - LFT or AST or ALT in last 12 months     Albumin   Date Value Ref Range Status   08/10/2020 3.6 3.5 - 5.0 g/dL Final     Bilirubin, Total   Date Value Ref Range Status   08/10/2020 0.3 0.0 - 1.0 mg/dL Final     Alkaline Phosphatase   Date Value Ref Range Status   08/10/2020 55 45 - 120 U/L Final     AST   Date Value Ref Range Status   08/10/2020 11 0 - 40 U/L Final     ALT   Date Value Ref Range Status   08/10/2020 20 0 - 45 U/L Final     Protein, Total   Date Value Ref Range Status   08/10/2020 7.0 6.0 - 8.0 g/dL Final                Passed - GFR or Serum Creatinine in last 6 months     GFR MDRD Non Af Amer   Date Value Ref Range Status   08/10/2020 >60 >60 mL/min/1.73m2 Final     GFR MDRD Af Amer   Date Value Ref Range Status   08/10/2020 >60 >60  mL/min/1.73m2 Final             Passed - Visit with PCP or prescribing provider visit in last 6 months or next 3 months     Last office visit with prescriber/PCP: Visit date not found OR same dept: 8/10/2020 Celestine Zelaya MD OR same specialty: 8/10/2020 Celestine Zelaya MD Last physical: Visit date not found Last MTM visit: Visit date not found         Next appt within 3 mo: Visit date not found  Next physical within 3 mo: Visit date not found  Prescriber OR PCP: Otf Morley MD  Last diagnosis associated with med order: 1. Type 2 diabetes, HbA1C goal < 8% (H)  - metFORMIN (GLUCOPHAGE-XR) 500 MG 24 hr tablet [Pharmacy Med Name: METFORMIN ER 500MG 24HR TABS]; TAKE 2 TABLETS BY MOUTH TWICE DAILY AT 6 AM AND AT 4 PM  Dispense: 360 tablet; Refill: 0     If protocol passes may refill for 12 months if within 3 months of last provider visit (or a total of 15 months).           Passed - A1C in last 6 months     Hemoglobin A1c   Date Value Ref Range Status   08/10/2020 8.8 (H) <=5.6 % Final     Comment:     Normal <5.7% Prediabete 5.7-6.4% Diabletes 6.5% or higher - adopted from ADA consensus guidelines

## 2021-06-10 NOTE — TELEPHONE ENCOUNTER
"RN Triage  Carlene is calling today. She reports that in the past few weeks has developed tingling in the left shoulder that goes up into her neck. Felt it last night, just lasts a minute or two. Is relieved with putting pressure on her shoulder. At night the pain as woken her up. Reports she can almost feel \"liquid\" inside on top of her shoulder and up her neck slightly. Has typically a high pain tolerance, this has been bothersome for her. Painful to turn her head really far to the side. Not swollen or red appearing. Pain comes and goes.     I advised per care advice and recommended in office visit within 3 days to evaluate symptoms. Scheduling to assist. Carlene is in agreement with this plan.    Eveline Kirby RN  [facility] Nurse Advisor      Reason for Disposition    MODERATE pain (e.g., interferes with normal activities) and present > 3 days    Additional Information    Negative: Shock suspected (e.g., cold/pale/clammy skin, too weak to stand, low BP, rapid pulse)    Negative: Similar pain previously and it was from 'heart attack'    Negative: Similar pain previously and it was from 'angina' and not relieved by nitroglycerin    Negative: Sounds like a life-threatening emergency to the triager    Negative: Difficulty breathing or unusual sweating (e.g., sweating without exertion)    Negative: Pain lasting > 5 minutes and pain also present in chest (Exception: pain is clearly made worse by movement)    Negative: Age > 40 and no obvious cause and pain even when not moving the arm (Exception: pain is clearly made worse by moving arm or bending neck)    Negative: Red area or streak and fever    Negative: Swollen joint and fever    Negative: Entire arm is swollen    Negative: Patient sounds very sick or weak to the triager    Negative: SEVERE pain (e.g., excruciating, unable to do any normal activities)    Negative: Shoulder pains with exertion (e.g., walking) and pain goes away on resting and not present " now    Negative: Painful rash with multiple small blisters grouped together (i.e., dermatomal distribution or 'band' or 'stripe')    Negative: Looks like a boil, infected sore, deep ulcer or other infected rash (spreading redness, pus)    Negative: Localized rash is very painful (no fever)    Negative: Weakness (i.e., loss of strength) in hand or fingers (Exception: not truly weak; hand feels weak because of pain)    Negative: Numbness (i.e., loss of sensation) in hand or fingers    Negative: Unable to use arm at all and because of shoulder pain or stiffness    Negative: Patient wants to be seen    Protocols used: SHOULDER PAIN-A-OH    COVID 19 Nurse Triage Plan/Patient Instructions    Please be aware that novel coronavirus (COVID-19) may be circulating in the community. If you develop symptoms such as fever, cough, or SOB or if you have concerns about the presence of another infection including coronavirus (COVID-19), please contact your health care provider or visit [email or web address].     Disposition/Instructions    Virtual Visit with provider recommended. Reference Visit Selection Guide.    Thank you for taking steps to prevent the spread of this virus.  o Limit your contact with others.  o Wear a simple mask to cover your cough.  o Wash your hands well and often.    Resources    [facility]: About COVID-19: [email or web address]    CDC: What to Do If You're Sick: www.cdc.gov/coronavirus/2019-ncov/about/steps-when-sick.html    CDC: Ending Home Isolation: www.cdc.gov/coronavirus/2019-ncov/hcp/disposition-in-home-patients.html     CDC: Caring for Someone: www.cdc.gov/coronavirus/2019-ncov/if-you-are-sick/care-for-someone.html     [facility]: Interim Guidance for Hospital Discharge to Home: www.health.[facility].[place]/diseases/coronavirus/hcp/hospdischarge.pdf    [facility] clinical trials (COVID-19 research studies): clinicalaffairs.[facility]/umn-clinical-trials     Below are the COVID-19 hotlines at  the [place] Department of Health ([facility]). Interpreters are available.   o For health questions: Call 025-063-7450 or 1-699.283.8994 (7 a.m. to 7 p.m.)  o For questions about schools and childcare: Call 044-340-7102 or 1-283.214.5185 (7 a.m. to 7 p.m.)

## 2021-06-10 NOTE — TELEPHONE ENCOUNTER
Dr. Angela,    Establishing care with Dr. Cervantes on 8/28/20.    Geri PERALTA LPN .......... 11:40 AM  08/13/20  [facility]

## 2021-06-10 NOTE — TELEPHONE ENCOUNTER
Former patient of Peyman & has not established care with another provider.  Please assign refill request to covering provider per Clinic standard process.  Refill Approved    Rx renewed per Medication Renewal Policy. Medication was last renewed on 7/23/19.    Alexa Moses, [facility] Triage/Med Refill 7/30/2020     Requested Prescriptions   Pending Prescriptions Disp Refills     lisinopriL-hydrochlorothiazide (PRINZIDE,ZESTORETIC) 10-12.5 mg per tablet [Pharmacy Med Name: LISINOPRIL-HCTZ 10/12.5MG TABLETS] 90 tablet 3     Sig: TAKE 1 TABLET BY MOUTH DAILY       Diuretics/Combination Diuretics Refill Protocol  Passed - 7/28/2020  3:51 AM        Passed - Visit with PCP or prescribing provider visit in past 12 months     Last office visit with prescriber/PCP: 6/10/2019 Otf Morley MD OR same dept: Visit date not found OR same specialty: 6/10/2019 Otf Morley MD  Last physical: 10/21/2019 Last MTM visit: Visit date not found   Next visit within 3 mo: Visit date not found  Next physical within 3 mo: Visit date not found  Prescriber OR PCP: Otf Morley MD  Last diagnosis associated with med order: 1. Essential hypertension  - lisinopriL-hydrochlorothiazide (PRINZIDE,ZESTORETIC) 10-12.5 mg per tablet [Pharmacy Med Name: LISINOPRIL-HCTZ 10/12.5MG TABLETS]; TAKE 1 TABLET BY MOUTH DAILY  Dispense: 90 tablet; Refill: 3    If protocol passes may refill for 12 months if within 3 months of last provider visit (or a total of 15 months).             Passed - Serum Potassium in past 12 months      Lab Results   Component Value Date    Potassium 4.0 10/21/2019             Passed - Serum Sodium in past 12 months      Lab Results   Component Value Date    Sodium 140 10/21/2019             Passed - Blood pressure on file in past 12 months     BP Readings from Last 1 Encounters:   10/21/19 154/80             Passed - Serum Creatinine in past 12 months      Creatinine   Date Value Ref Range Status   10/21/2019 0.82  0.60 - 1.10 mg/dL Final

## 2021-06-11 NOTE — PROGRESS NOTES
Office Visit   Carlene Agee   60 y.o. female    Date of Visit: 8/28/2020    Chief Complaint   Patient presents with     Establish Care     Follow-up     Left sided neck pain        Assessment and Plan   1. Type 2 diabetes, HbA1C goal < 8% (H)  Her blood sugars have been running high.  I am going to add glipizide to her medication.  We discussed potential side effects.  We will see her back in 2 to 3 months for recheck and for her annual physical.  She is in agreement with this plan.  We will also recheck all of her diabetic labs and cholesterol prior to that visit.  - metFORMIN (GLUCOPHAGE-XR) 500 MG 24 hr tablet; Take 2 tablets (1,000 mg total) by mouth 2 (two) times a day.  Dispense: 360 tablet; Refill: 3  - atorvastatin (LIPITOR) 10 MG tablet; Take 1 tablet (10 mg total) by mouth daily.  Dispense: 90 tablet; Refill: 3  - glipiZIDE (GLUCOTROL) 5 MG tablet; Take 1 tablet (5 mg total) by mouth 2 (two) times a day before meals. 1/2 Hour BEFORE meals  Dispense: 180 tablet; Refill: 3  - Lipid [place]; Future  - Glycosylated Hemoglobin A1c; Future  - Thyroid [place]; Future  - Comprehensive Metabolic Panel; Future  - Microalbumin, Random Urine; Future    2. HTN (hypertension)  Her blood pressure is high today.  She is not sure if she is taking both the lisinopril and hydrochlorothiazide or just the lisinopril.  She is going to check on that.  We will continue to monitor and recheck her labs when I see her back in a couple of months.  - metoprolol succinate (TOPROL-XL) 50 MG 24 hr tablet; Take 1 tablet (50 mg total) by mouth daily.  Dispense: 90 tablet; Refill: 3  - HM2(CBC w/o Differential); Future  - Thyroid [place]; Future  - Comprehensive Metabolic Panel; Future    3. Visit for screening mammogram  - Mammo Screening Bilateral; Future    4. Screen for colon cancer  - Ambulatory referral for Colonoscopy       Return in about 3 months (around 11/28/2020) for Annual physical.     History of Present Illness   This  60 y.o. old female comes in to [facility].  She has a history of type 2 diabetes mellitus.  She is on metformin.  She recently had an A1c that was elevated at 8.8.  She is agreeable to adding medication.  She is also on lipid medication as well as blood pressure medication.  She is not sure if she is taking both the lisinopril and hydrochlorothiazide or just lisinopril.  She is going to check on that.  Her blood pressure is a little bit high today.  She has not had any recent symptoms of chest pain or palpitations.  She has been having some left neck pain that is improving.  It started about a month ago.  She will continue to just monitor that.  She is agreeable to getting set up for a mammogram and colon cancer screening.  She has no other acute concerns today.    Review of Systems: As above, systems otherwise reviewed and negative.     Medications, Allergies and Problem List   Patient Active Problem List   Diagnosis     Obesity     HTN (hypertension)     Osteoarthritis Of The Knee     Type 2 diabetes, HbA1C goal < 8% (H)     Current Outpatient Medications   Medication Sig Dispense Refill     ACCU-CHEK GUIDE strips USE TO TEST ONCE DAILY 100 strip 0     atorvastatin (LIPITOR) 10 MG tablet Take 1 tablet (10 mg total) by mouth daily. 90 tablet 3     blood-glucose meter Misc Use to test once daily 1 each 0     gabapentin (NEURONTIN) 300 MG capsule TAKE 1 CAPSULE(300 MG) BY MOUTH THREE TIMES DAILY. FOLLOW-UP APPOINTMENT REQUIRED FOR FUTURE REFILLS 270 capsule 3     generic lancets (ACCU-CHEK MULTICLIX LANCET) Dispense brand per patient's insurance at pharmacy discretion. 100 each 3     lisinopriL-hydrochlorothiazide (PRINZIDE,ZESTORETIC) 10-12.5 mg per tablet TAKE 1 TABLET BY MOUTH DAILY 90 tablet 3     metFORMIN (GLUCOPHAGE-XR) 500 MG 24 hr tablet Take 2 tablets (1,000 mg total) by mouth 2 (two) times a day. 360 tablet 3     metoprolol succinate (TOPROL-XL) 50 MG 24 hr tablet Take 1 tablet (50 mg total) by mouth  "daily. 90 tablet 3     amoxicillin (AMOXIL) 500 MG capsule Take 4 capsules by mouth one hour prior to dental work 4 capsule 2     glipiZIDE (GLUCOTROL) 5 MG tablet Take 1 tablet (5 mg total) by mouth 2 (two) times a day before meals. 1/2 Hour BEFORE meals 180 tablet 3     No current facility-administered medications for this visit.      No Known Allergies       Physical Exam     /84   Pulse 96   Ht 5' 5\" (1.651 m)   Wt (!) 232 lb (105.2 kg)   SpO2 97%   BMI 38.61 kg/m      General: This is an alert female, sitting comfortably, no apparent distress.     Additional Information   Social History     Tobacco Use     Smoking status: Never Smoker     Smokeless tobacco: Never Used   Substance Use Topics     Alcohol use: Not Currently     Drug use: Never          Angela Cervantes MD    "

## 2021-06-12 NOTE — PROGRESS NOTES
"Office Visit - Follow up    Carlene Agee   57 y.o. female    Date of Visit: 7/31/2017    Chief Complaint   Patient presents with     Follow-up     New diabetes       Subjective: History of mild hyperglycemia with previous A1c is in the low sevens or sixes treated with a qualitative diabetic diet.  Has not returned for follow-up    History of hypertension generally well controlled with combination of metoprolol and lisinopril.    Recently seen by a podiatrist for a small ulcer in the distal aspect of the right fourth toe.  This has healed however podiatrist referred back to her internist myself and a A1c was elevated at 10.4.    As noted history of exogenous obesity hypertension and modest hyperglycemia.  Has had bilateral knee replacements which were uncomplicated also had removal of a large benign ovarian mass has done well since resection.    General health has otherwise been stable she is a  she works full-time as a  and is devoted to her 5 grandchildren she remains active    Has been following a qualitative diabetic diet her weight is down about 50 pounds from its peak    Denies problems with claudication or poor healing distal extremities.  Podiatrist had concerns about potential for peripheral vascular disease        ROS: A comprehensive review of systems was performed and was otherwise negative except as mentioned above.     Exam  Blood pressure initially elevated follow-up blood pressures are improved    Heart lungs and abdomen grossly negative    Peripheral pulses are palpable but significantly diminished no active ulcer there is trace edema   BP (!) 144/92  Pulse 80  Ht 5' 5\" (1.651 m)  Wt 219 lb (99.3 kg)  BMI 36.44 kg/m2    Assessment and Plan  Type 2 diabetes.  We will begin metformin and arrange for diabetic teaching including intensive diabetic instruction and home glucose monitoring.  I will see her again in about 4 weeks.  At that time we will discuss proceeding with " evaluation for peripheral vascular disease.  Will plan to begin statin at some point in the near future we will discuss this as well    Carlene was seen today for follow-up.    Diagnoses and all orders for this visit:    Essential hypertension with goal blood pressure less than 140/90  -     Ambulatory referral to Diabetic Education    Hyperglycemia  -     Ambulatory referral to Diabetic Education    Type 2 diabetes, HbA1C goal < 8%  -     Ambulatory referral to Diabetic Education    PVD (peripheral vascular disease)    Other orders  -     Discontinue: metFORMIN (GLUCOPHAGE) 500 MG tablet; Take 1 tablet (500 mg total) by mouth 2 (two) times a day with meals.  -     metFORMIN (GLUCOPHAGE-XR) 500 MG 24 hr tablet; Take 1 tablet (500 mg total) by mouth 2 times a day at 6:00 am and 4:00 pm.          Time: total time spent with the patient was 40 minutes of which >50% was spent in counseling and coordination of care        No Known Allergies    Medications :  Prior to Admission medications    Medication Sig Start Date End Date Taking? Authorizing Provider   lisinopril-hydrochlorothiazide (PRINZIDE,ZESTORETIC) 10-12.5 mg per tablet TAKE 1 TABLET BY MOUTH DAILY 5/8/17  Yes Otf Morley MD   metoprolol succinate (TOPROL-XL) 50 MG 24 hr tablet Take 50 mg by mouth daily.   Yes PROVIDER, HISTORICAL   metFORMIN (GLUCOPHAGE-XR) 500 MG 24 hr tablet Take 1 tablet (500 mg total) by mouth 2 times a day at 6:00 am and 4:00 pm. 7/31/17   Otf Morley MD   atenolol (TENORMIN) 50 MG tablet TAKE 1 TABLET BY MOUTH DAILY 9/30/16 7/31/17  Otf Morley MD   atenolol (TENORMIN) 50 MG tablet TAKE 1 TABLET BY MOUTH DAILY 1/2/17 7/31/17  Otf Morley MD   atenolol (TENORMIN) 50 MG tablet TAKE 1 TABLET BY MOUTH DAILY 4/2/17 7/31/17  Otf Morley MD   atenolol (TENORMIN) 50 MG tablet TAKE 1 TABLET BY MOUTH DAILY 4/2/17 7/31/17  Otf Morley MD   atenolol (TENORMIN) 50 MG tablet TAKE 1 TABLET BY MOUTH DAILY 7/3/17 7/31/17  Otf CABRERA  MD [place]   metFORMIN (GLUCOPHAGE) 500 MG tablet Take 1 tablet (500 mg total) by mouth 2 (two) times a day with meals. 7/31/17 7/31/17  Otf Morley MD        Past Medical History: No past medical history on file.    Past Surgical History: No past surgical history on file.    Social History:   Social History     Social History     Marital status:      Spouse name: N/A     Number of children: N/A     Years of education: N/A     Occupational History     Not on file.     Social History Main Topics     Smoking status: Never Smoker     Smokeless tobacco: Never Used     Alcohol use Not on file     Drug use: Not on file     Sexual activity: Not on file     Other Topics Concern     Not on file     Social History Narrative     No narrative on file       Family History: No family history on file.      Otf Morley MD

## 2021-06-12 NOTE — PROGRESS NOTES
Assessment: Carlene arrived today newly diagnosed with T2DM.  Demonstrated One Touch BG meter and pt returned demonstration.  Today in office BG was 163 mg/dl 1.5 hr post meal.  Educated her on A1c and BG goals.  She is taking Metformin without concerns.  Pt has been making positive lifestyle choices and states she has lost approximately 10 lbs in the past month.  She has limited CHO foods and is eating salads twice daily.  Provided Living well with diabetes and place mat and discussed.    She has many questions about reading nutrition facts labels, dietary fats and portion sizes.  Demonstrated and provided information.    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  She has started walking around the block with the grandchildren that live with her.  Encouraged her to continue.    She ran out of BP medication and is planning to  today.    Her   of cancer a few years ago.      Plan: see goals    Subjective and Objective:      Carlene Agee is referred by Dr Morley for Diabetes Education.     Lab Results   Component Value Date    HGBA1C 10.2 (H) 07/10/2017         Current diabetes medications:    Metformin 500 mg bid    Goals       medication            Take as prescribed        monitoring            Test BG with rotating test times each day.        nutrition            Continue to read nutrition facts labels and watch CHO intake            Follow up:   Primary care visit     Meals;  B-eggs with straw and ww toast  L-spinach salad with meat  D-spinach salad with meat and straw with broccoli  HS-chips with dip or ice cream  Loves bread ww and appetizers or bars. Limits desserts now  No coffee  And rare soda or wine    Walking most days for 15 min      Education:     Monitoring   Meter (per above goals): Assessed, Discussed, Literature provided and Patient returned demonstration  Monitoring: Assessed, Discussed, Literature provided and Patient returned  demonstration  BG goals: Assessed, Discussed and Literature provided    Nutrition Management  Nutrition Management: Assessed, Discussed and Literature provided  Weight: Assessed, Discussed and Literature provided  Portions/Balance: Assessed, Discussed and Literature provided  Carb ID/Count: Assessed, Discussed and Literature provided  Label Reading: Assessed, Discussed and Literature provided  Heart Healthy Fats: Assessed, Discussed and Literature provided  Menu Planning: Assessed, Discussed and Literature provided  Dining Out: Assessed, Discussed and Literature provided  Physical Activity: Assessed, Discussed and Literature provided  Medications: Assessed, Discussed and Literature provided  Orals: Assessed, Discussed and Literature provided  Injected Medications: Not addressed   Storage/Exp:Not addressed   Site Rotation: Not addressed   Sites Assessed: no    Diabetes Disease Process: Assessed    Acute Complications: Prevent, Detect, Treat:  Hypoglycemia: Assessed and Literature provided  Hyperglycemia: Assessed and Literature provided  Sick Days: Needs instruction/review at follow-up  Driving: Needs instruction/review at follow-up    Chronic Complications  Foot Care:Needs instruction/review at follow-up  Skin Care: Needs instruction/review at follow-up  Eye: Needs instruction/review at follow-up  ABC: Needs instruction/review at follow-up  Teeth:Needs instruction/review at follow-up  Goal Setting and Problem Solving: Needs instruction/review at follow-up  Barriers: Needs instruction/review at follow-up  Psychosocial Adjustments: Needs instruction/review at follow-up      Time spent with the patient: 60 minutes for diabetes education and counseling.   Previous Education: no  Visit Type:DSMT  Hours Remaining: DSMT 9 and MNT 3      Kelly Bliss RD, LD, CDE  8/8/2017

## 2021-06-12 NOTE — PROGRESS NOTES
"Office Visit - Follow up    Carlene Agee   57 y.o. female    Date of Visit: 8/22/2017    Chief Complaint   Patient presents with     Follow-up     Diabetes       Subjective: Here for follow-up of type 2 diabetes in addition to hypertension    Previous A1c's were stable however recent finding was an A1c of 10.4.  We had discussed her management and began metformin.  She has tolerated this well without symptoms of diarrhea or other GI symptoms    Blood sugars are much improved with average reading by review of sugars dropping from the mid 300s to the low 100s.  She has had no episodes of hypoglycemia.  Current regimen reviewed.  Previous lipid panel reviewed and normal however discussed rationale for beginning statin and will begin atorvastatin 10 mg daily.    Have arranged for mammography screening and colonoscopy    Discussed current regimen and treatment strategies and reviewed notes from her visit with the diabetic teaching team        ROS: A comprehensive review of systems was performed and was otherwise negative except as mentioned above.     Exam  Blood pressure as noted heart lungs abdomen negative no change   /72  Pulse 82  Ht 5' 5\" (1.651 m)  Wt 216 lb (98 kg)  BMI 35.94 kg/m2    Assessment and Plan  Type 2 diabetes with much improved control since starting metformin has tolerated treatment well without side effects    Hypertension well controlled    No history of hyperlipidemia however fulfills specific guidelines regarding statin use in view of her diabetes    Carlene was seen today for follow-up.    Diagnoses and all orders for this visit:    Screen for colon cancer  -     Ambulatory referral for Colonoscopy    Visit for screening mammogram  -     Mammo Screening Bilateral; Future    H/O: hysterectomy    Type 2 diabetes, HbA1C goal < 8%    Essential hypertension with goal blood pressure less than 140/90    Other orders  -     metoprolol succinate (TOPROL-XL) 50 MG 24 hr tablet; Take 1 tablet " (50 mg total) by mouth daily.  -     metFORMIN (GLUCOPHAGE-XR) 500 MG 24 hr tablet; Take 2 tablets (1,000 mg total) by mouth 2 times a day at 6:00 am and 4:00 pm.  -     atorvastatin (LIPITOR) 10 MG tablet; Take 1 tablet (10 mg total) by mouth daily.          Time: total time spent with the patient was 25 minutes of which >50% was spent in counseling and coordination of care        No Known Allergies    Medications :  Prior to Admission medications    Medication Sig Start Date End Date Taking? Authorizing Provider   lisinopril-hydrochlorothiazide (PRINZIDE,ZESTORETIC) 10-12.5 mg per tablet TAKE 1 TABLET BY MOUTH DAILY 5/8/17  Yes Otf Morley MD   metFORMIN (GLUCOPHAGE-XR) 500 MG 24 hr tablet Take 2 tablets (1,000 mg total) by mouth 2 times a day at 6:00 am and 4:00 pm. 8/22/17  Yes Otf Morley MD   metoprolol succinate (TOPROL-XL) 50 MG 24 hr tablet Take 1 tablet (50 mg total) by mouth daily. 8/22/17  Yes Otf Morley MD   metFORMIN (GLUCOPHAGE-XR) 500 MG 24 hr tablet Take 1 tablet (500 mg total) by mouth 2 times a day at 6:00 am and 4:00 pm. 7/31/17 8/22/17 Yes Otf Morley MD   metoprolol succinate (TOPROL-XL) 50 MG 24 hr tablet Take 50 mg by mouth daily.  8/22/17 Yes PROVIDER, HISTORICAL   atorvastatin (LIPITOR) 10 MG tablet Take 1 tablet (10 mg total) by mouth daily. 8/22/17   Otf Morley MD   blood glucose test (ONETOUCH VERIO) strips Test each day as directed 8/8/17   Otf Morley MD   lancets (ONETOUCH DELICA LANCETS) 30 gauge Misc Test each day as directed. 8/8/17   Otf Morley MD        Past Medical History: No past medical history on file.    Past Surgical History: No past surgical history on file.    Social History:   Social History     Social History     Marital status:      Spouse name: N/A     Number of children: N/A     Years of education: N/A     Occupational History     Not on file.     Social History Main Topics     Smoking status: Never Smoker     Smokeless tobacco:  Never Used     Alcohol use Not on file     Drug use: Not on file     Sexual activity: Not on file     Other Topics Concern     Not on file     Social History Narrative       Family History: No family history on file.      Otf Morley MD

## 2021-06-13 NOTE — TELEPHONE ENCOUNTER
Left message on home number to return our call.  Patient's cell phone number has a mail box that is full.  Please relay message from Dr. Cervantes.

## 2021-06-13 NOTE — PROGRESS NOTES
Carlene Agee did not show for the appointment with the diabetes educator.  Will attempt to reschedule.

## 2021-06-13 NOTE — PROGRESS NOTES
Office Visit - Physical   Carlene Agee   60 y.o.  female    Date of visit: 11/30/2020  Physician: Angela Cervantes MD     Assessment and Plan   1. Routine general medical examination at a health care facility  She is due for a mammogram and colon cancer screening.  She prefers to do the Cologuard test.  We will provide the pneumonia vaccine and tetanus today.  She is due for fasting lab work and we will set that up another day.  She is otherwise up-to-date on age-appropriate health maintenance.    2. Type 2 diabetes, HbA1C goal < 8% (H)  Her sugars have been running in the satisfactory range recently.  She has not had any cardiac symptoms.  Foot exam does show onychomycoses and recent signs of cellulitis but is improved.  She has no other signs of endorgan damage.  - HM2(CBC w/o Differential); Future  - Lipid Cascade; Future  - Glycosylated Hemoglobin A1c; Future  - Thyroid Cascade; Future  - Microalbumin, Random Urine; Future  - Comprehensive Metabolic Panel; Future    3. Essential hypertension  Blood pressure is a little bit on the high side.  I am going to change her to lisinopril 20 mg daily.  We will plan to recheck her blood pressure in a few months.  We will set up fasting lab work as noted above.  She has no cardiac symptoms.  - lisinopriL (PRINIVIL,ZESTRIL) 20 MG tablet; Take 1 tablet (20 mg total) by mouth daily.  Dispense: 90 tablet; Refill: 3  - HM2(CBC w/o Differential); Future  - Lipid Cascade; Future  - Thyroid [place]; Future  - Comprehensive Metabolic Panel; Future    4. Screen for colon cancer  - Cologuard    5. Visit for screening mammogram  - Mammo Screening Bilateral; Future          Return in about 3 months (around 2/28/2021) for Follow up - DM, and BP.     Chief Complaint   Chief Complaint   Patient presents with     Annual Exam     Pt is not fasting        Patient Profile   Social History     Social History Narrative     Not on file        Past Medical History   Patient Active Problem  List   Diagnosis     Obesity     HTN (hypertension)     Osteoarthritis Of The Knee     Type 2 diabetes, HbA1C goal < 8% (H)       Past Surgical History  She has a past surgical history that includes Hysterectomy and Replacement total knee bilateral.     History of Present Illness   This 60 y.o. old female comes in for physical exam.  She is due for a breast exam and a mammogram.  She is due for colon cancer screening and we discussed options.  She would like to proceed with the Cologuard test.  She is due for fasting lab work.  She has a history of type 2 diabetes mellitus.  She has some mild neuropathy symptoms but no other endorgan damage.  Her blood pressure is borderline today.  She is on lisinopril and hydrochlorothiazide.  She feels that she has too many urinary symptoms with the hydrochlorothiazide will plan to stop that and increase the lisinopril.  She has no history of heart disease and no chest pain or palpitations.  She has no acute concerns today.  She is due for a pneumonia vaccine and a tetanus vaccine.  She has had a hysterectomy and does not need Pap smears.  She has no acute concerns today.    Review of Systems: A comprehensive review of systems was negative except as noted.     Medications and Allergies   Current Outpatient Medications   Medication Sig Dispense Refill     ACCU-CHEK GUIDE strips USE TO TEST ONCE DAILY 100 strip 0     atorvastatin (LIPITOR) 10 MG tablet Take 1 tablet (10 mg total) by mouth daily. 90 tablet 3     blood-glucose meter Misc Use to test once daily 1 each 0     gabapentin (NEURONTIN) 300 MG capsule TAKE 1 CAPSULE(300 MG) BY MOUTH THREE TIMES DAILY. FOLLOW-UP APPOINTMENT REQUIRED FOR FUTURE REFILLS 270 capsule 3     generic lancets (ACCU-CHEK MULTICLIX LANCET) Dispense brand per patient's insurance at pharmacy discretion. 100 each 3     glipiZIDE (GLUCOTROL) 5 MG tablet Take 1 tablet (5 mg total) by mouth 2 (two) times a day before meals. 1/2 Hour BEFORE meals 180 tablet 3  "    metFORMIN (GLUCOPHAGE-XR) 500 MG 24 hr tablet Take 2 tablets (1,000 mg total) by mouth 2 (two) times a day. 360 tablet 3     metoprolol succinate (TOPROL-XL) 50 MG 24 hr tablet Take 1 tablet (50 mg total) by mouth daily. 90 tablet 3     amoxicillin (AMOXIL) 500 MG capsule Take 4 capsules by mouth one hour prior to dental work 4 capsule 2     lisinopriL (PRINIVIL,ZESTRIL) 20 MG tablet Take 1 tablet (20 mg total) by mouth daily. 90 tablet 3     No current facility-administered medications for this visit.      No Known Allergies     Family and Social History   Family History   Problem Relation Age of Onset     Heart attack Mother         Social History     Tobacco Use     Smoking status: Never Smoker     Smokeless tobacco: Never Used   Substance Use Topics     Alcohol use: Not Currently     Drug use: Never        Physical Exam   General Appearance:   This is an alert female, sitting comfortably, no apparent distress.    /80   Pulse 94   Ht 5' 4\" (1.626 m)   Wt (!) 237 lb (107.5 kg)   BMI 40.68 kg/m      HEENT:  Normocephalic, atraumatic.     NECK: Supple with no lymphadenopathy.  Thyroid was normal.  RESPIRATORY: Normal respiratory effort.  Lungs were clear to ausculation both anteriorly and posteriorly.  No wheezes or rales were detected.   CARDIOVASCULAR: Heart rate and rhythm were normal.  S1 and S2 were normal and there were no extra sounds or murmurs. Dorsalis pedis pulses were normal.  Jugular venous pressure was normal.  There was no peripheral edema.  GASTROINTESTINAL:  Bowel sounds were present.  Abdomen was soft, nontender, nondistended, with no organomegaly detected.  No rebound or guarding.  MUSCULOSKELETAL: Skeletal configuration was normal and muscle mass was normal for age. Joint appearance was overall normal.  Foot exam with onychomycoses but no sores or ulcerations.  LYMPHATIC: There were no enlarged nodes.  SKIN/HAIR/NAILS: Skin was warm and well perfused.  There were no skin lesions or " rashes noted.  Hair and nails were normal.  NEUROLOGIC: The patient was alert and oriented to person, place, time, and circumstance. Speech was normal. Cranial nerves were normal. No focal defecits were noted.  PSYCHIATRIC:  Mood and affect were normal and the patient had normal recent and remote memory. The patient's judgment and insight were normal.  BREASTS: Normal breast tissue with no masses or abnormalities.       Additional Information        Angela Cervantes MD  Internal Medicine  Contact me at [phone]

## 2021-06-13 NOTE — TELEPHONE ENCOUNTER
Left message to call back for: Carlene  Information to relay to patient:  Please relay message from Dr Cervantes below. Let us know if pt is in agreement to medication change.

## 2021-06-13 NOTE — PROGRESS NOTES
"Office Visit - Follow up    Carlene Agee   57 y.o. female    Date of Visit: 11/6/2017    Chief Complaint   Patient presents with     Follow-up     Diabetes, both feet painful       Subjective: Lu has a history of type 2 diabetes.  She complains of neuropathic symptoms of shooting discomfort or jumping sensation or sandrine pain in both feet    Was recently seen by her podiatrist her nails were trimmed and she developed some painful swelling and redness with mild exudate from the left great toe it appears there was a slight decrease or compromise in the skin integrity resulting in mild cellulitis.    Otherwise feels well blood sugars have ranged typically around 140-150.  We are repeating a A1c today.    ROS: A comprehensive review of systems was performed and was otherwise negative except as mentioned above.     Exam  Exam of both feet reveals palpable but diminished pulses there is no edema there is redness with minimal exudate and no evidence of true ulcer of the left great toe mild tenderness of the feet in general.   /76  Pulse 78  Ht 5' 5\" (1.651 m)  Wt 213 lb (96.6 kg)  BMI 35.45 kg/m2    Assessment and Plan  Diabetic neuropathy with jumping or unusual shooting sensation.  We will begin a trial of gabapentin    Mild cellulitis left great toe possibly related to recent nail trimming.  This is very mild the limb does not appear threatened I will treat with Keflex and and have her follow-up with her podiatrist next week I will see her in 2 weeks will review A1c as well    Carlene was seen today for follow-up.    Diagnoses and all orders for this visit:    Essential hypertension  -     Glycosylated Hemoglobin A1c    Type 2 diabetes, HbA1C goal < 8%  -     Glycosylated Hemoglobin A1c    Cellulitis    Other orders  -     cephalexin (KEFLEX) 500 MG capsule; Take 1 capsule (500 mg total) by mouth 4 (four) times a day for 7 days.  -     gabapentin (NEURONTIN) 300 MG capsule; Take 1 capsule (300 mg total) " by mouth 3 (three) times a day.  -     Influenza, Seasonal Quad, Preservative Free 36+ Months          Time: total time spent with the patient was 25 minutes of which >50% was spent in counseling and coordination of care        No Known Allergies    Medications :  Prior to Admission medications    Medication Sig Start Date End Date Taking? Authorizing Provider   atorvastatin (LIPITOR) 10 MG tablet Take 1 tablet (10 mg total) by mouth daily. 8/22/17  Yes Otf Morley MD   lisinopril-hydrochlorothiazide (PRINZIDE,ZESTORETIC) 10-12.5 mg per tablet TAKE 1 TABLET BY MOUTH DAILY 5/8/17  Yes Otf Morley MD   metFORMIN (GLUCOPHAGE-XR) 500 MG 24 hr tablet TAKE 2 TABLETS BY MOUTH TWICE DAILY AT 6 AM AND 4 PM 9/27/17  Yes Otf Morley MD   metoprolol succinate (TOPROL-XL) 50 MG 24 hr tablet Take 1 tablet (50 mg total) by mouth daily. 8/22/17  Yes Otf Morley MD   blood glucose test (ONETOUCH VERIO) strips Test each day as directed 8/8/17   Otf Morley MD   cephalexin (KEFLEX) 500 MG capsule Take 1 capsule (500 mg total) by mouth 4 (four) times a day for 7 days. 11/6/17 11/13/17  Otf Morley MD   gabapentin (NEURONTIN) 300 MG capsule Take 1 capsule (300 mg total) by mouth 3 (three) times a day. 11/6/17   Otf Morley MD   lancets (ONETOUCH DELICA LANCETS) 30 gauge Misc Test each day as directed. 8/8/17   Otf Morley MD        Past Medical History: No past medical history on file.    Past Surgical History: No past surgical history on file.    Social History:   Social History     Social History     Marital status:      Spouse name: N/A     Number of children: N/A     Years of education: N/A     Occupational History     Not on file.     Social History Main Topics     Smoking status: Never Smoker     Smokeless tobacco: Never Used     Alcohol use Not on file     Drug use: Not on file     Sexual activity: Not on file     Other Topics Concern     Not on file     Social History Narrative       Family  History: No family history on file.      Otf Morley MD

## 2021-06-13 NOTE — TELEPHONE ENCOUNTER
----- Message from Angela Cervantes MD sent at 12/15/2020  7:31 AM CST -----  Please call and let her know that her labs found that her diabetes is not as well controlled as recommended with the A1C of 9.2.  I recommend she increase her glipizide to 10 mg twice daily to see if that helps.  If she's in agreement, please forward the prescription.  The rest of her labs including kidney, liver, CBC, thyroid, and lipids were all normal.  Thanks.

## 2021-06-14 NOTE — PROGRESS NOTES
"Office Visit - Follow up    Carlene Agee   57 y.o. female    Date of Visit: 11/21/2017    Chief Complaint   Patient presents with     Follow-up       Subjective: Here for follow-up of diabetic control in addition to mild cellulitis involving her toes and diabetic neuropathy.    Generally doing better she is finding the gabapentin to be quite helpful for her neuropathic symptoms.  Most recent A1c was much improved.    Did have some redness involving the toe right foot this had improved she continues with foot soaks there is no evidence today of active infection.        ROS: A comprehensive review of systems was performed and was otherwise negative except as mentioned above.     Exam  Right great toe reveals some erythema toe hygiene is good no evidence of active infection.  I will have her monitor this erythema and plan follow-up A1c in 3 months or so.  Diabetic control is much improved   /78  Pulse 80  Ht 5' 5\" (1.651 m)  Wt 216 lb (98 kg)  BMI 35.94 kg/m2    Assessment and Plan       Carlene was seen today for follow-up.    Diagnoses and all orders for this visit:    Essential hypertension    Type 2 diabetes, HbA1C goal < 8%          Time: total time spent with the patient was 20 minutes of which >50% was spent in counseling and coordination of care        No Known Allergies    Medications :  Prior to Admission medications    Medication Sig Start Date End Date Taking? Authorizing Provider   atorvastatin (LIPITOR) 10 MG tablet Take 1 tablet (10 mg total) by mouth daily. 8/22/17  Yes Otf Morley MD   gabapentin (NEURONTIN) 300 MG capsule Take 1 capsule (300 mg total) by mouth 3 (three) times a day. 11/6/17  Yes Otf Morley MD   lisinopril-hydrochlorothiazide (PRINZIDE,ZESTORETIC) 10-12.5 mg per tablet TAKE 1 TABLET BY MOUTH DAILY 5/8/17  Yes Otf Morley MD   metFORMIN (GLUCOPHAGE-XR) 500 MG 24 hr tablet TAKE 2 TABLETS BY MOUTH TWICE DAILY AT 6 AM AND 4 PM 9/27/17  Yes Otf Morley MD "   metoprolol succinate (TOPROL-XL) 50 MG 24 hr tablet Take 1 tablet (50 mg total) by mouth daily. 8/22/17  Yes Otf Morley MD   blood glucose test (ONETOUCH VERIO) strips Test each day as directed 8/8/17   Otf Morley MD   lancets (ONETOUCH DELICA LANCETS) 30 gauge Misc Test each day as directed. 8/8/17   Otf Morley MD        Past Medical History: No past medical history on file.    Past Surgical History: No past surgical history on file.    Social History:   Social History     Social History     Marital status:      Spouse name: N/A     Number of children: N/A     Years of education: N/A     Occupational History     Not on file.     Social History Main Topics     Smoking status: Never Smoker     Smokeless tobacco: Never Used     Alcohol use Not on file     Drug use: Not on file     Sexual activity: Not on file     Other Topics Concern     Not on file     Social History Narrative       Family History: No family history on file.      Otf Morley MD

## 2021-06-14 NOTE — PROGRESS NOTES
"Assessment:   Carlene arrived today with her BG log.  She is testing FBG most days with results above target range.  She has tested a few other times during the day in the past few months with varied results.    She is taking Metformin without concerns and rarely forgets.  She was in [place] for the light display and did not bring enough Metformin with her.    She has noted that her FBG is starting to increase.  She has also gained approximately 5 lbs in the past month.  She admitted to \"not eating as well as (she) should.\"  She is also not active.  She has a stationary bike in her living room and is agreeable to start each day.    She stated she \"knows\" what foods to eat and portion sizes.    Educated pt on general foot care guidelines as well as sick day guidelines.  She is uncertain if she should take another antibiotic for her toe and is agreeable to f/u with podiatrist or PCP.    She is not able to get insurance through her employer in 2018 and will find another carrier.  She is concerned about the cost of medications although most are generics.    Pt relayed stories about her \"celebrations of life\" of her late , Suleiman.  His anniversary is .  Three of her grandchildren live with her now while her daughter pays off school loans.      Wt Readings from Last 3 Encounters:   17 218 lb (98.9 kg)   17 216 lb (98 kg)   17 213 lb (96.6 kg)       Pat note:  Her   of cancer a few years ago.      Plan: see goals    Subjective and Objective:      Carlene Agee is referred by Dr Morley for Diabetes Education.     Lab Results   Component Value Date    HGBA1C 7.1 (H) 2017     FB, 222 (missed dose), -, 163, 163, 153, 155, 170, 173, 150, 160, 160, 161, 180, 142    Current diabetes medications:    Metformin 500 mg  Two tablets bid    Goals       activity            Start on stationary bike most days.          medication            Take as prescribed  DOING        monitoring  "           Test BG with rotating test times each day.  DOING.        nutrition            Continue to read nutrition facts labels and watch CHO intake  TRYING.            Follow up:   Primary care visit     Meals;  B-eggs with straw and ww toast  L-spinach salad with meat  D-spinach salad with meat and straw with broccoli  HS-chips with dip or ice cream  Loves bread ww and appetizers or bars. Limits desserts now.  Started to eat chocolate 11/271/7  No coffee  And rare soda or wine    Walking most days for 15 min.  Now mall walks some days.  Plans to start biking 11/27/17.        Education:     Monitoring   Meter (per above goals): Assessed and Discussed  Monitoring: Assessed and Discussed  BG goals: Assessed and Discussed    Nutrition Management  Nutrition Management: Assessed and Discussed  Weight: Assessed and Discussed  Portions/Balance: Assessed and Discussed  Carb ID/Count: Assessed and Discussed  Label Reading: Assessed  Heart Healthy Fats: Assessed  Menu Planning: Assessed  Dining Out: Assessed  Physical Activity: Assessed  Medications: Assessed  Orals: Assessed and Discussed  Injected Medications: Not addressed   Storage/Exp:Not addressed   Site Rotation: Not addressed   Sites Assessed: no    Diabetes Disease Process: Assessed    Acute Complications: Prevent, Detect, Treat:  Hypoglycemia: Assessed  Hyperglycemia: Assessed  Sick Days: Assessed, Discussed and Literature provided  Driving: Needs instruction/review at follow-up    Chronic Complications  Foot Care:Assessed and Discussed-sees podiatrist   Skin Care: Needs instruction/review at follow-up  Eye: Needs instruction/review at follow-up  ABC: Needs instruction/review at follow-up  Teeth:Needs instruction/review at follow-up  Goal Setting and Problem Solving: Needs instruction/review at follow-up  Barriers: Needs instruction/review at follow-up  Psychosocial Adjustments: Needs instruction/review at follow-up      Time spent with the patient: 60 minutes for  diabetes education and counseling.   Previous Education: no  Visit Type:DSMT  Hours Remaining: DSMT 8 and MNT 3      Kelly Bliss RD, LD, CDE  11/27/2017

## 2021-06-15 NOTE — TELEPHONE ENCOUNTER
Refill Approved    Rx renewed per Medication Renewal Policy. Medication was last renewed on 7/17/19.    Kade Mancuso, [facility] Triage/Med Refill 2/28/2021     Requested Prescriptions   Pending Prescriptions Disp Refills     gabapentin (NEURONTIN) 300 MG capsule [Pharmacy Med Name: GABAPENTIN 300MG CAPSULES] 270 capsule 3     Sig: TAKE 1 CAPSULE(300 MG) BY MOUTH THREE TIMES DAILY.       Gabapentin/Levetiracetam/Tiagabine Refill Protocol  Passed - 2/28/2021 11:30 AM        Passed - PCP or prescribing provider visit in past 12 months or next 3 months     Last office visit with prescriber/PCP: 6/10/2019 Otf Morley MD OR same dept: 8/28/2020 Angela Cervantes MD OR same specialty: 8/28/2020 Angela Cervantes MD  Last physical: 10/21/2019 Last MTM visit: Visit date not found   Next visit within 3 mo: Visit date not found  Next physical within 3 mo: Visit date not found  Prescriber OR PCP: Otf Morley MD  Last diagnosis associated with med order: 1. Neuropathy  - gabapentin (NEURONTIN) 300 MG capsule [Pharmacy Med Name: GABAPENTIN 300MG CAPSULES]; TAKE 1 CAPSULE(300 MG) BY MOUTH THREE TIMES DAILY.  Dispense: 270 capsule; Refill: 3    If protocol passes may refill for 12 months if within 3 months of last provider visit (or a total of 15 months).

## 2021-06-16 PROBLEM — E11.9 TYPE 2 DIABETES, HBA1C GOAL < 7% (H): Status: ACTIVE | Noted: 2017-07-31

## 2021-06-16 PROBLEM — E66.01 MORBID OBESITY (H): Status: ACTIVE | Noted: 2021-03-29

## 2021-06-16 NOTE — TELEPHONE ENCOUNTER
Left message to call back for: Patient  Information to relay to patient:  Please see Dr. Leryo's results message below and relay to pt when she calls back.

## 2021-06-16 NOTE — PROGRESS NOTES
"Assessment:     Carlene arrived today with her medications and no BG meter or log book.  She states she is testing FBG with results in the 160-180 mg/dl  She is taking medications without concerns.  Pt stated that she has \"not been good\" and is eating out frequently.  She also eats lunch at the school cafeteria. Reviewed portion sizes of meals and educated pt on [employer] website to look up foods.  She stated she \"knows what to eat.\"  She relayed information about her recent trip to [place] for her birthday.  She was walking while traveling.  Weight has increased.  She also noted that her dentist is considering partial dentures.  Discussed reducing the BG levels to avoid gum disease.    She may benefit from additional medication if A1c is elevated and is agreeable to schedule appt with Dr Morley in the next few weeks to discuss.      Wt Readings from Last 3 Encounters:   18 222 lb (100.7 kg)   17 218 lb (98.9 kg)   17 216 lb (98 kg)       Pat note:  Her   of cancer a few years ago.      Plan: see goals    Subjective and Objective:      Carlene Agee is referred by Dr Morley for Diabetes Education.     Lab Results   Component Value Date    HGBA1C 7.1 (H) 2017     FBG:  No records      Current diabetes medications:    Metformin 500 mg  Two tablets bid    Goals       activity            Start on stationary bike most days.  STARTED A FEW TIMES        medication            Take as prescribed  DOING        monitoring            Test BG with rotating test times each day.  DOING.        nutrition            Continue to read nutrition facts labels and watch CHO intake  TRYING.            Follow up:   Primary care visit     Meals;  B-eggs with straw and ww toast  L-spinach salad with meat or pizza at school or green beans with chicken sand.    D-spinach salad with meat and straw with broccoli  HS-chips with dip or ice cream  Loves bread ww and appetizers or bars. Limits desserts now.  " Started to eat chocolate 11/271/7  No coffee  And rare soda or wine    Walking most days for 15 min.  Now mall walks some days.  Plans to start biking 11/27/17.        Education:     Monitoring   Meter (per above goals): Assessed and Discussed  Monitoring: Assessed and Discussed  BG goals: Assessed and Discussed    Nutrition Management  Nutrition Management: Assessed and Discussed  Weight: Assessed and Discussed  Portions/Balance: Assessed and Discussed  Carb ID/Count: Assessed and Discussed  Label Reading: Assessed  Heart Healthy Fats: Assessed  Menu Planning: Assessed  Dining Out: Assessed  Physical Activity: Assessed  Medications: Assessed  Orals: Assessed and Discussed  Injected Medications: Not addressed   Storage/Exp:Not addressed   Site Rotation: Not addressed   Sites Assessed: no    Diabetes Disease Process: Assessed    Acute Complications: Prevent, Detect, Treat:  Hypoglycemia: Assessed  Hyperglycemia: Assessed  Sick Days: Assessed, Discussed and Literature provided  Driving: Needs instruction/review at follow-up    Chronic Complications  Foot Care:Assessed and Discussed-sees podiatrist   Skin Care: Needs instruction/review at follow-up  Eye: Needs instruction/review at follow-up  ABC: Needs instruction/review at follow-up  Teeth:Needs instruction/review at follow-up  Goal Setting and Problem Solving: Needs instruction/review at follow-up  Barriers: Needs instruction/review at follow-up  Psychosocial Adjustments: Needs instruction/review at follow-up      Time spent with the patient: 60 minutes for diabetes education and counseling.   Previous Education: no  Visit Type:MNT  Hours Remaining: DSMT 8 and MNT 2      Kelly Bliss RD, LD, CDE  3/19/2018

## 2021-06-16 NOTE — TELEPHONE ENCOUNTER
----- Message from Lisa Leroy, DO sent at 3/30/2021  2:17 PM CDT -----  Please call patient with their results. Let her know her A1c has improved but it is still above 7.  Like we discussed I think we should start her on a GLP-1.  I will place a referral for diabetes education and an order for the new medication.    Lisa Leroy

## 2021-06-16 NOTE — TELEPHONE ENCOUNTER
Left detailed message on patients voice mail (she verified her name) and relayed Dr. Leroy's message. Informed patient to give us a call back if she has any additional questions/concerns.    LORETTA/STEVE

## 2021-06-16 NOTE — TELEPHONE ENCOUNTER
Left message to call back for: Patient  Information to relay to patient:  Please see Dr. Leroy's results message below and relay to pt when she calls back.

## 2021-06-16 NOTE — TELEPHONE ENCOUNTER
Refill Approved    Rx renewed per Medication Renewal Policy. Medication was last renewed on 03/29/2021. Wrong quantity sent in. Will resend.    Trisha Keen, [facility] Triage/Med Refill 3/30/2021     Requested Prescriptions   Pending Prescriptions Disp Refills     glipiZIDE (GLUCOTROL) 10 MG tablet [Pharmacy Med Name: GLIPIZIDE 10MG TABLETS] 180 tablet 0     Sig: TAKE 1 TABLET BY MOUTH TWICE DAILY 30 MINUTES BEFORE MEALS       Oral Hypoglycemics Refill Protocol Passed - 3/29/2021  2:26 PM        Passed - Visit with PCP or prescribing provider visit in last 6 months       Last office visit with prescriber/PCP: 3/29/2021 OR same dept: 3/29/2021 Lisa Leroy DO OR same specialty: 3/29/2021 Lisa Leroy DO Last physical: Visit date not found Last MTM visit: Visit date not found         Next appt within 3 mo: Visit date not found  Next physical within 3 mo: Visit date not found  Prescriber OR PCP: Lisa Leroy DO  Last diagnosis associated with med order: 1. Type 2 diabetes, HbA1C goal < 8% (H)  - glipiZIDE (GLUCOTROL) 10 MG tablet [Pharmacy Med Name: GLIPIZIDE 10MG TABLETS]; TAKE 1 TABLET BY MOUTH TWICE DAILY 30 MINUTES BEFORE MEALS  Dispense: 180 tablet; Refill: 0     If protocol passes may refill for 12 months if within 3 months of last provider visit (or a total of 15 months).           Passed - A1C in last 6 months     Hemoglobin A1c   Date Value Ref Range Status   03/29/2021 8.3 (H) <=5.6 % Final               Passed - Microalbumin in last year      Microalbumin, Random Urine   Date Value Ref Range Status   12/14/2020 1.04 0.00 - 1.99 mg/dL Final                  Passed - Blood pressure in last year     BP Readings from Last 1 Encounters:   03/29/21 160/86             Passed - Serum creatinine in last year     Creatinine   Date Value Ref Range Status   12/14/2020 0.64 0.60 - 1.10 mg/dL Final

## 2021-06-16 NOTE — PROGRESS NOTES
SUBJECTIVE       Carlene Agee is a 61 y.o.  female with a PMH significant for:     Patient Active Problem List   Diagnosis     Obesity     HTN (hypertension)     Osteoarthritis Of The Knee     Type 2 diabetes, HbA1C goal < 8% (H)     Morbid obesity (H)     She presents to establish care.    Patient has been struggling with some right ring finger stiffness.  Patient notes has been going on for a few months.  Patient denies any pain in other joints.  Patient denies any history of pain in that joint.  Patient does have a strong history of osteoarthritis and a high pain tolerance.  Patient denies any trauma to the finger.  Patient denies any clicking or catching.  Patient is wondering if it is arthritis.  Discussed that it could possibly be that or a recent trauma that she was unaware of.  We will continue to monitor it.    Patient recently had an elevated A1c back in December.  She was advised to increase her glipizide to 10 mg twice daily.  Patient is taking 10 mg every morning but sometimes forgets in the evening.  Patient struggles to take it half an hour before dinner.  Agrees with getting it rechecked today.  Discussed that we should start a GLP-1 if her A1c remains high.  Discussed referral for diabetes education for education of injectable therapy.  Patient is hesitant about injections but will try it.    Patient has no other questions or concerns.  She is feeling well.  Reviewed patient's chart and medications with her.    Patients blood pressure is high today and has been high the last few visits.  Discussed increasing her lisinopril to 40 mg.  Patient in agreement with this.  BP Readings from Last 3 Encounters:   03/29/21 160/86   11/30/20 148/80   08/28/20 178/84     PMHx, PSHx, Social Hx, Family Hx, Medications, and Allergies reviewed and updated in chart as needed.         REVIEW OF SYSTEMS     Pertinent items are noted in HPI.  A 12 point comprehensive review of systems was negative except as  noted.     Patient has some numbness of her feet bilaterally.  She describes it as a heaviness.  Patient has multiple skin lesions.          OBJECTIVE     Vitals:    03/29/21 1203   BP: 160/86   Patient Site: Left Arm   Patient Position: Sitting   Cuff Size: Adult Regular   Pulse: 79   Temp: 98.1  F (36.7  C)   TempSrc: Oral   SpO2: 96%   Weight: (!) 238 lb 9 oz (108.2 kg)     Body mass index is 40.95 kg/m .    Constitutional: Awake, alert, cooperative, no apparent distress, and appears stated age.  Eyes: Lids and lashes normal, sclera clear, conjunctiva normal.  ENT: Normocephalic, without obvious abnormality, atramatic,   Musculoskeletal: No redness, warmth, or swelling of the joints.  Full range of motion noted.   Neurologic: Awake, alert, oriented to name, place and time.  Cranial nerves II-XII are grossly intact and gait is normal.  Skin: Multiple seborrheic keratosis noted    Results reviewed:  Results for orders placed or performed in visit on 12/14/20   HM2(CBC w/o Differential)   Result Value Ref Range    WBC 6.8 4.0 - 11.0 thou/uL    RBC 3.97 3.80 - 5.40 mill/uL    Hemoglobin 12.1 12.0 - 16.0 g/dL    Hematocrit 35.9 35.0 - 47.0 %    MCV 90 80 - 100 fL    MCH 30.5 27.0 - 34.0 pg    MCHC 33.8 32.0 - 36.0 g/dL    RDW 12.7 11.0 - 14.5 %    Platelets 256 140 - 440 thou/uL    MPV 7.2 7.0 - 10.0 fL   Lipid Cascade   Result Value Ref Range    Cholesterol 125 <=199 mg/dL    Triglycerides 91 <=149 mg/dL    HDL Cholesterol 42 (L) >=50 mg/dL    LDL Calculated 65 <=129 mg/dL    Patient Fasting > 8hrs? Yes    Glycosylated Hemoglobin A1c   Result Value Ref Range    Hemoglobin A1c 9.2 (H) <=5.6 %   Thyroid Cascade   Result Value Ref Range    TSH 1.04 0.30 - 5.00 uIU/mL   Microalbumin, Random Urine   Result Value Ref Range    Microalbumin, Random Urine 1.04 0.00 - 1.99 mg/dL    Creatinine, Urine 53.4 mg/dL    Microalbumin/Creatinine Ratio Random Urine 19.5 <=19.9 mg/g   Comprehensive Metabolic Panel   Result Value Ref  Range    Sodium 139 136 - 145 mmol/L    Potassium 3.8 3.5 - 5.0 mmol/L    Chloride 104 98 - 107 mmol/L    CO2 24 22 - 31 mmol/L    Anion Gap, Calculation 11 5 - 18 mmol/L    Glucose 135 (H) 70 - 125 mg/dL    BUN 12 8 - 22 mg/dL    Creatinine 0.64 0.60 - 1.10 mg/dL    GFR MDRD Af Amer >60 >60 mL/min/1.73m2    GFR MDRD Non Af Amer >60 >60 mL/min/1.73m2    Bilirubin, Total 0.5 0.0 - 1.0 mg/dL    Calcium 9.0 8.5 - 10.5 mg/dL    Protein, Total 7.0 6.0 - 8.0 g/dL    Albumin 3.8 3.5 - 5.0 g/dL    Alkaline Phosphatase 62 45 - 120 U/L    AST 23 0 - 40 U/L    ALT 34 0 - 45 U/L       ASSESSMENT AND PLAN     Carlene was seen today for establish care.    Diagnoses and all orders for this visit:    Essential hypertension: Patient's pressures are elevated on exam today.  They have been elevated for the past few visits.  Advised increasing her lisinopril to 40 mg.  Patient in agreement this plan.  Patient also on metoprolol for an unclear reason.  May consider discontinuing in the future if we do not feel it is beneficial.  Offered to have patient check blood pressure at home versus follow-up in 4 weeks and recheck here.  Patient would prefer to follow-up.  -     lisinopriL (PRINIVIL,ZESTRIL) 40 MG tablet; Take 1 tablet (40 mg total) by mouth daily.    Morbid obesity (H)    Type 2 diabetes, HbA1C goal < 8% (H): Patient has a history of diabetes.  Last A1c was elevated.  Patient was started on 10 mg of glipizide.  Discussed collecting A1c today and and adding GLP-1 if patient's A1c is still elevated.  Patient in agreement this plan.  Will send to diabetes education for an discussion of injectable therapy if A1c is elevated.  -     Glycosylated Hemoglobin A1c  -     glipiZIDE (GLUCOTROL) 10 MG tablet; Take 1 tablet (10 mg total) by mouth 2 (two) times a day before meals. 1/2 Hour BEFORE meals    RTC in 4 weeks for follow up of blood pressure or sooner if develops new or worsening symptoms.    Lisa Leroy DO

## 2021-06-17 NOTE — PROGRESS NOTES
SUBJECTIVE       Carlene Agee is a 61 y.o.  female with a PMH significant for:     Patient Active Problem List   Diagnosis     Obesity     HTN (hypertension)     Osteoarthritis Of The Knee     Type 2 diabetes, HbA1c goal < 7% (H)     Morbid obesity (H)     She presents for follow-up of hypertension and diabetes.    Reviewed patient's prior hemoglobin A1c with her.  Patient's hemoglobin A1c had dropped one-point since last check.  Because it still remains over the goal of 7.0.  Discussed starting GLP-1.  Patient noted that Ozempic was eight hundred dollars at the pharmacy.  Because of this I will for two diabetes education to help decide which GLP-1 will be better covered and provide education.    Patient's lisinopril increased at last visit due to hypertension.  Patient's blood pressure is still elevated today.  Patient has been on hydrochlorothiazide in the past and disliked the diuretic effects.  Patient okay with starting amlodipine.    BP Readings from Last 3 Encounters:   04/26/21 152/80   03/29/21 160/86   11/30/20 148/80       PMH, Medications and Allergies were reviewed and updated as needed.        REVIEW OF SYSTEMS     Pertinent items are noted in HPI.        OBJECTIVE     Vitals:    04/26/21 1244 04/26/21 1300   BP: 162/74 152/80   Pulse: 84    SpO2: 94%    Weight: (!) 245 lb (111.1 kg)      Body mass index is 42.05 kg/m .    Constitutional: Awake, alert, cooperative, no apparent distress, and appears stated age.  Eyes: Lids and lashes normal, sclera clear, conjunctiva normal.  ENT: Normocephalic, without obvious abnormality, atramatic,   Lungs: No increased work of breathing, good air exchange, clear to auscultation bilaterally, no crackles or wheezing.  Cardiovascular: Regular rate and rhythm, normal S1 and S2, no S3 or S4, and no murmur noted.  Musculoskeletal: No redness, warmth, or swelling of the joints.  Full range of motion noted.   Neurologic: Awake, alert, oriented to name, place and  time.  Cranial nerves II-XII are grossly intact and gait is normal.    Results for orders placed or performed in visit on 03/29/21   Glycosylated Hemoglobin A1c   Result Value Ref Range    Hemoglobin A1c 8.3 (H) <=5.6 %       ASSESSMENT AND PLAN     Carlene was seen today for follow-up.    Diagnoses and all orders for this visit:    Essential hypertension: Patient's blood pressure continues to be elevated.  We will add amlodipine.  Patient advised to follow-up in 4 weeks for recheck.  -     amLODIPine (NORVASC) 5 MG tablet; Take 1 tablet (5 mg total) by mouth at bedtime.    Type 2 diabetes, HbA1c goal < 7% (H): Patient's A1c has improved since last check but still elevated.  Advised to follow-up with diabetes educator to discuss starting GLP-1.  Hopefully they can find one that is covered by insurance.    RTC in 4 weeks for follow up of BP or sooner if develops new or worsening symptoms.    Lisa Leroy DO

## 2021-06-19 NOTE — LETTER
Letter by Otf Morley MD at      Author: Otf Morley MD Service: -- Author Type: --    Filed:  Encounter Date: 6/11/2019 Status: (Other)         Carlene Agee  [address] [place] [address]             June 11, 2019         Dear Ms. Agee,    Below are the results from your recent visit:    Resulted Orders   Microalbumin, Random Urine   Result Value Ref Range    Microalbumin, Random Urine 1.16 0.00 - 1.99 mg/dL    Creatinine, Urine 77.5 mg/dL    Microalbumin/Creatinine Ratio Random Urine 15.0 <=19.9 mg/g    Narrative    Microalbumin, Random Urine  <2.0 mg/dL . . . . . . . . Normal  3.0-30.0 mg/dL . . . . . . Microalbuminuria  >30.0 mg/dL . . . . . .  . Clinical Proteinuria  Microalbumin/Creatinine Ratio, Random Urine  <20 mg/g . . . . .. . . . Normal   mg/g . . . . . . . Microalbuminuria  >300 mg/g . . . . . . . . Clinical Proteinuria   Glycosylated Hemoglobin A1c   Result Value Ref Range    Hemoglobin A1c 7.6 (H) 3.5 - 6.0 %   HM2(CBC w/o Differential)   Result Value Ref Range    WBC 6.5 4.0 - 11.0 thou/uL    RBC 4.49 3.80 - 5.40 mill/uL    Hemoglobin 13.3 12.0 - 16.0 g/dL    Hematocrit 39.7 35.0 - 47.0 %    MCV 88 80 - 100 fL    MCH 29.6 27.0 - 34.0 pg    MCHC 33.6 32.0 - 36.0 g/dL    RDW 12.9 11.0 - 14.5 %    Platelets 302 140 - 440 thou/uL    MPV 7.4 7.0 - 10.0 fL   Comprehensive Metabolic Panel   Result Value Ref Range    Sodium 139 136 - 145 mmol/L    Potassium 3.8 3.5 - 5.0 mmol/L    Chloride 103 98 - 107 mmol/L    CO2 26 22 - 31 mmol/L    Anion Gap, Calculation 10 5 - 18 mmol/L    Glucose 172 (H) 70 - 125 mg/dL    BUN 11 8 - 22 mg/dL    Creatinine 0.69 0.60 - 1.10 mg/dL    GFR MDRD Af Amer >60 >60 mL/min/1.73m2    GFR MDRD Non Af Amer >60 >60 mL/min/1.73m2    Bilirubin, Total 0.4 0.0 - 1.0 mg/dL    Calcium 9.8 8.5 - 10.5 mg/dL    Protein, Total 7.5 6.0 - 8.0 g/dL    Albumin 3.8 3.5 - 5.0 g/dL    Alkaline Phosphatase 56 45 - 120 U/L    AST 11 0 - 40 U/L    ALT 22 0 - 45 U/L     Narrative    Fasting Glucose reference range is 70-99 mg/dL per  American Diabetes Association (ADA) guidelines.   Lipid Cascade   Result Value Ref Range    Cholesterol 134 <=199 mg/dL    Triglycerides 94 <=149 mg/dL    HDL Cholesterol 43 (L) >=50 mg/dL    LDL Calculated 72 <=129 mg/dL    Patient Fasting > 8hrs? Unknown        Carlene-all of your lab work looks excellent however the A1c is a bit higher at 7.6 last time we did this was 7.1.  I would pay a little closer attention to your carbohydrate intake.  I think we should repeat your A1c later this fall.    Again it was great to see you everything else looks good.  See you again for a lab in the fall    Please call with questions or contact us using [employer].    Sincerely,        Electronically signed by Otf Morley MD

## 2021-06-20 NOTE — LETTER
Letter by Celestine Zelaya MD at      Author: Celestine Zelaya MD Service: -- Author Type: --    Filed:  Encounter Date: 8/11/2020 Status: (Other)         Carlene Agee  [address]  [address]  [facility] [address]             August 11, 2020         Dear Ms. Agee,    Below are the results from your recent visit:    Resulted Orders   Glycosylated Hemoglobin A1c   Result Value Ref Range    Hemoglobin A1c 8.8 (H) <=5.6 %      Comment:      Normal <5.7% Prediabete 5.7-6.4% Diabletes 6.5% or higher - adopted from ADA consensus guidelines   Comprehensive Metabolic Panel   Result Value Ref Range    Sodium 137 136 - 145 mmol/L    Potassium 3.8 3.5 - 5.0 mmol/L    Chloride 101 98 - 107 mmol/L    CO2 23 22 - 31 mmol/L    Anion Gap, Calculation 13 5 - 18 mmol/L    Glucose 221 (H) 70 - 125 mg/dL    BUN 12 8 - 22 mg/dL    Creatinine 0.71 0.60 - 1.10 mg/dL    GFR MDRD Af Amer >60 >60 mL/min/1.73m2    GFR MDRD Non Af Amer >60 >60 mL/min/1.73m2    Bilirubin, Total 0.3 0.0 - 1.0 mg/dL    Calcium 9.2 8.5 - 10.5 mg/dL    Protein, Total 7.0 6.0 - 8.0 g/dL    Albumin 3.6 3.5 - 5.0 g/dL    Alkaline Phosphatase 55 45 - 120 U/L    AST 11 0 - 40 U/L    ALT 20 0 - 45 U/L    Narrative    Fasting Glucose reference range is 70-99 mg/dL per  American Diabetes Association (ADA) guidelines.   HM2(CBC w/o Differential)   Result Value Ref Range    WBC 6.6 4.0 - 11.0 thou/uL    RBC 3.99 3.80 - 5.40 mill/uL    Hemoglobin 12.1 12.0 - 16.0 g/dL    Hematocrit 36.0 35.0 - 47.0 %    MCV 90 80 - 100 fL    MCH 30.3 27.0 - 34.0 pg    MCHC 33.5 32.0 - 36.0 g/dL    RDW 12.6 11.0 - 14.5 %    Platelets 282 140 - 440 thou/uL    MPV 7.6 7.0 - 10.0 fL       Your diabetes control needs to be much better.  Ideally, the A1c should be under 7%.  The rest of your labs look fine.  You can discuss further with Dr. Cervantes at your appointment later this month.    Please call with questions or contact us using  MyChart.    Sincerely,        Electronically signed by Celestine Zelaya MD

## 2021-06-21 NOTE — LETTER
Letter by Angela Cervantes MD at      Author: Angela Cervantes MD Service: -- Author Type: --    Filed:  Encounter Date: 12/15/2020 Status: (Other)         Carlene Agee  [address]  [address]  [facility] [address]             December 18, 2020         Dear Ms. Agee,    Below are the results from your recent visit:    Resulted Orders   HM2(CBC w/o Differential)   Result Value Ref Range    WBC 6.8 4.0 - 11.0 thou/uL    RBC 3.97 3.80 - 5.40 mill/uL    Hemoglobin 12.1 12.0 - 16.0 g/dL    Hematocrit 35.9 35.0 - 47.0 %    MCV 90 80 - 100 fL    MCH 30.5 27.0 - 34.0 pg    MCHC 33.8 32.0 - 36.0 g/dL    RDW 12.7 11.0 - 14.5 %    Platelets 256 140 - 440 thou/uL    MPV 7.2 7.0 - 10.0 fL   Lipid Cascade   Result Value Ref Range    Cholesterol 125 <=199 mg/dL    Triglycerides 91 <=149 mg/dL    HDL Cholesterol 42 (L) >=50 mg/dL    LDL Calculated 65 <=129 mg/dL    Patient Fasting > 8hrs? Yes    Glycosylated Hemoglobin A1c   Result Value Ref Range    Hemoglobin A1c 9.2 (H) <=5.6 %   Thyroid Cascade   Result Value Ref Range    TSH 1.04 0.30 - 5.00 uIU/mL   Microalbumin, Random Urine   Result Value Ref Range    Microalbumin, Random Urine 1.04 0.00 - 1.99 mg/dL    Creatinine, Urine 53.4 mg/dL    Microalbumin/Creatinine Ratio Random Urine 19.5 <=19.9 mg/g    Narrative    Microalbumin, Random Urine  <2.0 mg/dL . . . . . . . . Normal  3.0-30.0 mg/dL . . . . . . Microalbuminuria  >30.0 mg/dL . . . . . .  . Clinical Proteinuria    Microalbumin/Creatinine Ratio, Random Urine  <20 mg/g . . . . .. . . . Normal   mg/g . . . . . . . Microalbuminuria  >300 mg/g . . . . . . . . Clinical Proteinuria       Comprehensive Metabolic Panel   Result Value Ref Range    Sodium 139 136 - 145 mmol/L    Potassium 3.8 3.5 - 5.0 mmol/L    Chloride 104 98 - 107 mmol/L    CO2 24 22 - 31 mmol/L    Anion Gap, Calculation 11 5 - 18 mmol/L    Glucose 135 (H) 70 - 125 mg/dL    BUN 12 8 - 22 mg/dL    Creatinine 0.64 0.60 - 1.10 mg/dL    GFR  MDRD Af Amer >60 >60 mL/min/1.73m2    GFR MDRD Non Af Amer >60 >60 mL/min/1.73m2    Bilirubin, Total 0.5 0.0 - 1.0 mg/dL    Calcium 9.0 8.5 - 10.5 mg/dL    Protein, Total 7.0 6.0 - 8.0 g/dL    Albumin 3.8 3.5 - 5.0 g/dL    Alkaline Phosphatase 62 45 - 120 U/L    AST 23 0 - 40 U/L    ALT 34 0 - 45 U/L    Narrative    Fasting Glucose reference range is 70-99 mg/dL per  American Diabetes Association (ADA) guidelines.     Messi Concepcion your labs show that your diabetes is not as well controlled as recommended with the A1C of 9.2.  I recommend you increase your glipizide to 10 mg twice daily to see if that helps.  If are in agreement, please let us know so we can send in a prescription.  The rest of your labs including kidney, liver, CBC, thyroid, and lipids were all normal.       Please call with questions or contact us using [employer].    Sincerely,        Electronically signed by Angela Cervantes MD

## 2021-06-23 NOTE — TELEPHONE ENCOUNTER
RN cannot approve Refill Request    RN can NOT refill this medication PCP messaged that patient is overdue for Labs and Office Visit.     Last office visit: 11/21/2017 Otf Morley MD   Next visit within 3 mo: Visit date not found  Next physical within 3 mo: Visit date not found      Batool Tomlinson, [facility] Triage/Med Refill 1/11/2019    Requested Prescriptions   Pending Prescriptions Disp Refills     metFORMIN (GLUCOPHAGE-XR) 500 MG 24 hr tablet [Pharmacy Med Name: METFORMIN ER 500MG 24HR TABS] 360 tablet 0     Sig: TAKE 2 TABLETS BY MOUTH TWICE DAILY AT 6 AM AND AT 4 PM    Metformin Refill Protocol Failed - 1/11/2019  8:33 PM       Failed - Blood pressure in last 12 months    BP Readings from Last 1 Encounters:   11/21/17 120/78            Failed - LFT or AST or ALT in last 12 months    Albumin   Date Value Ref Range Status   07/20/2012 3.6 3.4 - 5.0 g/dL Final     Bilirubin, Total   Date Value Ref Range Status   07/20/2012 0.47 <1.01 mg/dL Final     Alkaline Phosphatase   Date Value Ref Range Status   07/20/2012 64 50 - 136 U/L Final     AST   Date Value Ref Range Status   07/20/2012 33 15 - 37 U/L Final     ALT   Date Value Ref Range Status   07/20/2012 82 (H) 12 - 78 U/L Final     Comment:     New ALT test method with new reference range as of 6/4/12     Protein, Total   Date Value Ref Range Status   07/20/2012 6.9 6.4 - 8.2 g/dL Final               Failed - GFR or Serum Creatinine in last 6 months    GFR MDRD Non Af Amer   Date Value Ref Range Status   07/10/2017 >60 >60 mL/min/1.73m2 Final     GFR MDRD Af Amer   Date Value Ref Range Status   07/10/2017 >60 >60 mL/min/1.73m2 Final            Failed - Visit with PCP or prescribing provider visit in last 6 months or next 3 months    Last office visit with prescriber/PCP: Visit date not found OR same dept: Visit date not found OR same specialty: 11/21/2017 Otf Morley MD Last physical: Visit date not found Last MT visit: Visit date not found          Next appt within 3 mo: Visit date not found  Next physical within 3 mo: Visit date not found  Prescriber OR PCP: Otf Morley MD  Last diagnosis associated with med order: 1. Type 2 diabetes, HbA1C goal < 8% (H)  - metFORMIN (GLUCOPHAGE-XR) 500 MG 24 hr tablet [Pharmacy Med Name: METFORMIN ER 500MG 24HR TABS]; TAKE 2 TABLETS BY MOUTH TWICE DAILY AT 6 AM AND AT 4 PM  Dispense: 360 tablet; Refill: 0     If protocol passes may refill for 12 months if within 3 months of last provider visit (or a total of 15 months).          Failed - A1C in last 6 months    Hemoglobin A1c   Date Value Ref Range Status   11/06/2017 7.1 (H) 3.5 - 6.0 % Final              Failed - Microalbumin in last year     No results found for: MICROALBUR

## 2021-06-23 NOTE — TELEPHONE ENCOUNTER
RN cannot approve Refill Request    RN can NOT refill this medication PCP messaged that patient is overdue for Office Visit.     Alexa Moses, [facility] Triage/Med Refill 2/11/2019    Requested Prescriptions   Pending Prescriptions Disp Refills     gabapentin (NEURONTIN) 300 MG capsule [Pharmacy Med Name: GABAPENTIN 300MG CAPSULES] 90 capsule 0     Sig: TAKE 1 CAPSULE BY MOUTH THREE TIMES DAILY    Gabapentin/Levetiracetam/Tiagabine Refill Protocol  Failed - 2/8/2019 11:31 AM       Failed - PCP or prescribing provider visit in past 12 months or next 3 months    Last office visit with prescriber/PCP: 11/21/2017 Otf Morley MD OR same dept: Visit date not found OR same specialty: 11/21/2017 Otf Morley MD  Last physical: Visit date not found Last MTM visit: Visit date not found   Next visit within 3 mo: Visit date not found  Next physical within 3 mo: Visit date not found  Prescriber OR PCP: Otf Morley MD  Last diagnosis associated with med order: 1. Neuropathy (H)  - gabapentin (NEURONTIN) 300 MG capsule [Pharmacy Med Name: GABAPENTIN 300MG CAPSULES]; TAKE 1 CAPSULE BY MOUTH THREE TIMES DAILY  Dispense: 90 capsule; Refill: 0    If protocol passes may refill for 12 months if within 3 months of last provider visit (or a total of 15 months).

## 2021-06-24 ENCOUNTER — COMMUNICATION - HEALTHEAST (OUTPATIENT)
Dept: INTERNAL MEDICINE | Facility: CLINIC | Age: 61
End: 2021-06-24

## 2021-06-24 DIAGNOSIS — E11.9 TYPE 2 DIABETES, HBA1C GOAL < 8% (H): ICD-10-CM

## 2021-06-25 NOTE — PROGRESS NOTES
"       SUBJECTIVE       Carlene Agee is a 61 y.o.  female with a PMH significant for:     Patient Active Problem List   Diagnosis     Obesity     HTN (hypertension)     Osteoarthritis Of The Knee     Type 2 diabetes, HbA1c goal < 7% (H)     Morbid obesity (H)     She presents for follow-up of blood pressure.    Patient started amlodipine 5 mg.  She has been taking this regularly at night.  Patient's blood pressures have come down nicely but still remain over 140.  BP Readings from Last 3 Encounters:   05/25/21 144/70   04/26/21 152/80   03/29/21 160/86   Not noting any lightheadedness.  Patient not noting any change in edema over her shins.  Discussed increasing medication to 10 mg.  Patient in agreement with this plan.  We will follow up in 4 weeks to reevaluate.    Patient is scheduled with the diabetes educator in 1 week to discuss starting a GLP-1.    PMH, Medications and Allergies were reviewed and updated as needed.        REVIEW OF SYSTEMS     Pertinent items are noted in HPI.        OBJECTIVE     Vitals:    05/25/21 1331   BP: 144/70   Patient Site: Left Arm   Patient Position: Sitting   Cuff Size: Adult Large   Pulse: 81   Temp: 98.8  F (37.1  C)   TempSrc: Tympanic   SpO2: 97%   Weight: (!) 238 lb 9 oz (108.2 kg)   Height: 5' 4\" (1.626 m)     Body mass index is 40.95 kg/m .    Constitutional: Awake, alert, cooperative, no apparent distress, and appears stated age.  Eyes: Lids and lashes normal, sclera clear, conjunctiva normal.  ENT: Normocephalic, without obvious abnormality, atramatic,   Lungs: No increased work of breathing, good air exchange, clear to auscultation bilaterally, no crackles or wheezing.  Cardiovascular: Regular rate and rhythm.  1/6 Systolic murmur best auscultated over the aortic pole.  Abdomen: Normal bowel sounds, soft, non-distended, non-tender, no masses palpated, no hepatosplenomegally.  Musculoskeletal: No redness, warmth, or swelling of the joints.  Full range of motion " noted.   Neurologic: Awake, alert, oriented to name, place and time.  Cranial nerves II-XII are grossly intact and gait is normal.    Results for orders placed or performed in visit on 03/29/21   Glycosylated Hemoglobin A1c   Result Value Ref Range    Hemoglobin A1c 8.3 (H) <=5.6 %       ASSESSMENT AND PLAN     Carlene was seen today for follow-up.    Diagnoses and all orders for this visit:    Systolic murmur: Patient noted to have a systolic murmur over the aortic pole during exam.  This was not noted on her last exam.  Patient notes she has never had a history of a murmur.  Discussed getting an echo to further evaluate the anatomy of her heart.  Patient in agreement with this plan.  -     Echo Complete; Future    Essential hypertension: Patients blood pressure has responded to the starting of amlodipine.  Advised that we should increase to 10 mg.  Patient in agreement with this plan.  Patient will watch for any additional swelling on her legs.  -     amLODIPine (NORVASC) 10 MG tablet; Take 1 tablet (10 mg total) by mouth daily.    RTC in 4 weeks for follow up of blood pressure or sooner if develops new or worsening symptoms.    Lisa Leroy DO

## 2021-06-26 NOTE — TELEPHONE ENCOUNTER
Refill Approved    Rx renewed per Medication Renewal Policy. Medication was last renewed on 1/30/20, last OV 5/25/21.    Eliz Orozco, [facility] Triage/Med Refill 6/24/2021     Requested Prescriptions   Pending Prescriptions Disp Refills     ACCU-CHEK GUIDE TEST STRIPS strips [Pharmacy Med Name: ACCU-CHEK GUIDE TEST STRIPS 100S] 100 strip 0     Sig: USE TO TEST ONCE DAILY       Diabetic Supplies Refill Protocol Passed - 6/24/2021  7:02 AM        Passed - Visit with PCP or prescribing provider visit in last 6 months     Last office visit with prescriber/PCP: Visit date not found OR same dept: 8/28/2020 Angela Cervantes MD OR same specialty: 8/28/2020 Angela Cervantes MD  Last physical: Visit date not found Last MTM visit: Visit date not found   Next visit within 3 mo: Visit date not found  Next physical within 3 mo: Visit date not found  Prescriber OR PCP: Naun Mayorga MD  Last diagnosis associated with med order: 1. Type 2 diabetes, HbA1C goal < 8% (H)  - ACCU-CHEK GUIDE TEST STRIPS strips [Pharmacy Med Name: ACCU-CHEK GUIDE TEST STRIPS 100S]; USE TO TEST ONCE DAILY  Dispense: 100 strip; Refill: 0    If protocol passes may refill for 12 months if within 3 months of last provider visit (or a total of 15 months).             Passed - A1C in last 6 months     Hemoglobin A1c   Date Value Ref Range Status   03/29/2021 8.3 (H) <=5.6 % Final

## 2021-06-26 NOTE — PATIENT INSTRUCTIONS - HE
1. Eat 3 balanced meals each day - Monitor carb intake and limit to 45-60 grams per meal  This would be equal to 3-4 choices ~  1 choice = 15 grams    Do not wait longer than 4-5 hours to eat something  Snacks limit to no more than 30 grams of carbohydrates or 2 choices  Make sure you include protein source with each meal and at bedtime - this has been shown to help with blood glucose elevations    2. Check blood sugars 2  times each day  - when you wake up before eating   - 2 hours AFTER dinner   Fasting and before meal target is 80 - 130   2 hours after a meal target is < 180  remember to bring meter and log book to all appointments    3. Incorporate 30 minutes activity into each day - does not need to be all at one time & walking counts    4. Take diabetes medications as prescribed Continue Metformin 1000 mg twice daily, Glipizide

## 2021-07-04 NOTE — PROGRESS NOTES
"Progress Notes by Connie Reza RN at 6/9/2021  1:00 PM     Author: Connie Reza RN Service: -- Author Type: Registered Nurse    Filed: 6/11/2021 11:08 AM Encounter Date: 6/9/2021 Status: Attested    : Connie Reza RN (Registered Nurse) Cosigner: Lisa Leroy DO at 6/11/2021  1:09 PM    Attestation signed by Lisa Leroy DO at 6/11/2021  1:09 PM    I have reviewed the notes, assessments, and/or procedures performed by Connie, I concur with her/his documentation of Carlene Agee.    Lisa Leroy D.O.  [facility]                   Assessment: Carlene Agee is a  61 y.o.who presents today for a consult to review BG and assess current diabetes self-management skills with A1c presently not at ADA goal of <7.0 Carlene arrived alone and unfortunately did not bring her meter or logbook with her. Per report, She has been checking  BG once each morning and states she has been taking her Metformin each day as prescribed but 6 out of 7 days each week will forget her second dose of glipizide.  To help hopefully to rectify the situation changed her glipizide to extended release during her visit today.  This is my first encounter with Carlene and per her report she had been diagnosed with diabetes approximately 4 years ago.  She said she did receive education after initial diagnosis and felt she did really well in the beginning but since has kind of \"slacked off.\"  I reviewed with her today the pathophysiology of DM with differences between type I and type II, we talked about an A1c and its significance with diabetes and after reviewing her dietary intake did provide her with some nutritional counseling I felt would be beneficial for her.  At last visit with PCP she was prescribed a GLP-1 agonists but when she went to go pick it up from the cost would be over $800.  It was mutually decided today that she is going to work harder on diet and exercise to see if we cannot " get her blood glucose under better control without adding an adjunct medication at this time.    Carlene is a recently retired -has grandchildren she spends time with 3 days each week.  She lives with her 26-year-old son who she refers to as her LP (life partner) who has special needs    Diabetes Risk Factors:   age over 45 years, hypertension and overweight/obesity    Relevant complications, co-morbidities and related health problems:  Significant for:  overweight/obesity and hypertension    Current diabetes medications: Metformin XR 1000 mg p.o. twice daily and glipizide 10 mg p.o. twice daily  BG Summary: Since she did not have her meter or any logbook with her today all blood glucose information is solely from her recall alone.  Carlene reported today she is checking her blood sugar once each morning-this morning BG was 152 mg/dL.  She said her a.m. blood glucose readings usually are in the range of 140's to 160's.  We talked today about monitoring blood sugars at different times of the day as I explained getting a wider variety of data helps us to develop a more effective plan of care.  Asked if she would be willing to increase times to twice daily which she said she would.    Nutrition: Currently eating 3 meals each day    B: Approximately 6 AM   1 slice of toast with butter, cereal and a banana     9 AM will either have some pretzels or wheat thins and a glass of orange juice approximately 8 ounces  L: Noon  cottage cheese with some roast beef and Triscuits or a sandwich (PBJ) fruit-clementines  D: 5 to 5:30 PM dinner always contain some type of protein, vegetable, starch (potatoes, risotto) 8 ounces of milk  HS snack ice cream  Beverages water, milk, juices    Activity: Tries to walk daily as stated above she does spend 3 days each week with her grandchildren which she says keeps her active    Plan: Check blood sugars twice each day -a.m. fasting and again 2 hours after dinner, Carlene was  "reminded to bring meter and log book to all follow up appointments for review. Eat three balanced meals each day following the parameters for intake for all meals and snacks from ADA guidelines. Keep active - goal being 30 minutes daily of moderate activity. Medications: Continue Metformin XR 2000 mg p.o. daily and 20 mg of glipizide extended release daily.   Follow up has been scheduled for 7/7. She was instructed to call with any questions/concerns that may come up before then.      Subjective and Objective:      Carlene ALBERTO Agee has been referred by Dr. Leroy for Diabetes Education.     Lab Results   Component Value Date    HGBA1C 8.3 (H) 03/29/2021         Wt (!) 230 lb 4.8 oz (104.5 kg)   BMI 39.53 kg/m      Goals     ? activity      Start on stationary bike most days.  STARTED A FEW TIMES      ? medication      Take as prescribed  DOING      ? monitoring      Test BG with rotating test times each day.  DOING.      ? nutrition      Continue to read nutrition facts labels and watch CHO intake  TRYING.            Follow up:   Primary care visit  CDE (certified diabetic educator)    Education Materials Provided:, \"[employer]ac[employer]\", \"BG Log Sheet\" reading labels handout, and GLP-1 handout    Education:     Monitoring   Meter (per above goals): Assessed, Discussed and Literature provided  Monitoring: Assessed, Discussed, Literature provided and Needs instruction/review at follow-up  BG goals: Assessed, Discussed and Literature provided    Nutrition Management  Nutrition Management: Assessed, Discussed and Literature provided  Weight: Assessed and Discussed  Portions/Balance: Assessed, Discussed and Literature provided  Carb ID/Count: Assessed, Discussed and Literature provided  Label Reading: Assessed, Discussed and Literature provided  Heart Healthy Fats: Discussed  Menu Planning: Assessed, Discussed and Literature provided  Dining Out: Discussed  Physical Activity: Assessed and " Discussed  Medications: Assessed and Discussed  Orals: Assessed and Discussed  Injected Medications: Discussed and Literature provided       Diabetes Disease Process: Assessed and Discussed    Acute Complications: Prevent, Detect, Treat:  Hypoglycemia: Assessed and Discussed  Hyperglycemia: Assessed and Discussed  Sick Days: Not addressed  Driving: Not addressed    Chronic Complications  Foot Care:Discussed  Skin Care: Not addressed  Eye: Assessed  ABC: Assessed and Discussed  Teeth:Assessed  Goal Setting and Problem Solving: Assessed and Discussed  Barriers: Assessed and Discussed  Psychosocial Adjustments: Assessed and Discussed      Time spent with the patient: 60 minutes for diabetes education and counseling.   Previous Education: Yes  Visit Type: DSMT        Connie Reza RN CDCES  Diabetes Education  6/9/2021    Much or all of the text in this note was generated through the use of the Dragon Dictate voice-to-text software. Errors in spelling or words which seem out of context are unintentional. Sound alike errors, in particular, may have escaped editing.  Any diabetes medication dose changes were made via the CDE Protocol. A copy of this encounter was shared with the provider.

## 2021-07-04 NOTE — ADDENDUM NOTE
Addendum Note by James Leroy DO at 3/29/2021 12:00 PM     Author: James Leroy DO Service: -- Author Type: Physician    Filed: 3/30/2021  2:21 PM Encounter Date: 3/29/2021 Status: Signed    : James Leroy DO (Physician)    Addended by: JAMES LEROY on: 3/30/2021 02:21 PM        Modules accepted: Orders

## 2021-07-06 VITALS
BODY MASS INDEX: 40.73 KG/M2 | OXYGEN SATURATION: 97 % | HEART RATE: 81 BPM | SYSTOLIC BLOOD PRESSURE: 144 MMHG | WEIGHT: 238.56 LBS | TEMPERATURE: 98.8 F | HEIGHT: 64 IN | DIASTOLIC BLOOD PRESSURE: 70 MMHG

## 2021-07-06 VITALS — WEIGHT: 230.3 LBS | BODY MASS INDEX: 39.53 KG/M2

## 2021-07-07 ENCOUNTER — OFFICE VISIT - HEALTHEAST (OUTPATIENT)
Dept: EDUCATION SERVICES | Facility: CLINIC | Age: 61
End: 2021-07-07

## 2021-07-07 ENCOUNTER — AMBULATORY - HEALTHEAST (OUTPATIENT)
Dept: EDUCATION SERVICES | Facility: CLINIC | Age: 61
End: 2021-07-07

## 2021-07-07 DIAGNOSIS — E11.9 TYPE 2 DIABETES, HBA1C GOAL < 7% (H): ICD-10-CM

## 2021-07-07 LAB — HBA1C MFR BLD: 7.5 %

## 2021-07-07 NOTE — PATIENT INSTRUCTIONS - HE
Patient Instructions by Connie Reza RN at 7/7/2021  3:00 PM     Author: Connie Reza RN Service: -- Author Type: Registered Nurse    Filed: 7/7/2021  3:35 PM Encounter Date: 7/7/2021 Status: Signed    : Connie Reza RN (Registered Nurse)       1. Eat 3 balanced meals each day - Monitor carb intake and limit to 45-60 grams per meal  This would be equal to 3-4 choices ~  1 choice = 15 grams    Do not wait longer than 4-5 hours to eat something  Snacks limit to no more than 30 grams of carbohydrates or 2 choices  Make sure you include protein source with each meal and at bedtime - this has been shown to help with blood glucose elevations    2. Check blood sugars 1-2 times each day   - when you wake up  - 2 hours AFTER dinner    Fasting and before meal target is 80 - 130   2 hours after a meal target is < 180  remember to bring meter and log book to all appointments    3. Incorporate 30 minutes activity into each day - does not need to be all at one time & walking counts    4. Take diabetes medications as prescribed continue Metformin 1000 mg twice daily and 20 mg Glipizide XL each day    CONGRATULATIONS TO YOU !!! YOU ARE DOING AMAZEBALLS !! KEEP IT UP

## 2021-07-10 VITALS — WEIGHT: 220.6 LBS | BODY MASS INDEX: 37.87 KG/M2

## 2021-07-12 NOTE — PROGRESS NOTES
Progress Notes by Connie Reza RN at 2021  3:00 PM     Author: Connie Reza RN Service: -- Author Type: Registered Nurse    Filed: 2021  7:13 PM Encounter Date: 2021 Status: Attested    : Connie Reza RN (Registered Nurse) Cosigner: Lisa Leroy DO at 2021  4:22 PM    Attestation signed by Lisa Leroy DO at 2021  4:22 PM    I have reviewed the notes, assessments, and/or procedures performed by Connie Reza, I concur with her/his documentation of Carlene Agee.    Lisa Leroy D.O.  [facility]                  Assessment: Carlene Agee is a  61 y.o.who presents today for an office visit to follow-up and review BG and current diabetes self-management skills with A1c presently not at ADA goal of <7.0 Carlene arrived alone and had her log book with her. Per report, She has been checking  BG twice daily and states she has been taking both diabetes medications daily as prescribed with no missed or skipped doses.  Since our last visit Carlene states she is feeling good and thinks things are going well.  She continues to work on improving her diet and activity level.    Diabetes Risk Factors:   age over 45 years, hypertension and overweight/obesity    Relevant complications, co-morbidities and related health problems:  Significant for:  peripheral neuropathy and overweight/obesity    Current diabetes medications: Metformin 1000 mg p.o. twice daily, glipizide XL 20 mg p.o. daily  BG Summary: The following are [facility] BG readings  through  with most recent listed first  FB, 203, 132, 156, 128, 153, 142, 126, 146, 110, 107, 126, 133  2-hour post dinner: 176, 210, 225, 159, 230, 215, 156, 228, 163, 161, 161  These readings were reviewed and discussed with Carlene during our visit today. Overall her fasting numbers look relatively good however many of her postmeal readings are above ADA target of less than 180 mg/dL's. Carlene  has been continuing to steadily lose weight so I am hoping that this will help with her glycemic control. If not would recommend adding a GLP-1 agonist or SGLT2 if able to find one affordable    Nutrition: Currently eating 2-3 meals each day    B: Often will have a banana and green very cereal or eggs or avocados with cottage cheese  L: If not for breakfast will have cottage cheese and avocados for lunch  D: This meal she is with her son each night frequently is chicken with some type of vegetable or salad starch will be something like risotto or mashed potatoes  We compared her current dietary choices to the previous ones that were documented and several significant changes were noted. She has cut back on the amount of bread she has been eating as well as snack foods. Carlene is no longer drinking juice like she used to or having ice cream before bed.    Activity: Reports she is walking daily has her grandchildren 3 days each week and is always doing some type of activity that involves exercise or is physical.    Plan: Check blood sugars 1-2 times each day , Carlene was reminded to bring meter and log book to all follow up appointments for review. Eat three balanced meals each day following the parameters for intake for all meals and snacks from ADA guidelines. Keep active - goal being 30 minutes daily of moderate activity. Medications: Continue taking Metformin and glipizide as prescribed-may want to consider adding GLP-1 agonist or SGLT2 in the future.   Follow up has been scheduled for 10/4. She was instructed to call with any questions/concerns that may come up before then.      Subjective and Objective:      Carlene ALBERTO Anuel has been referred by Dr. Leroy for Diabetes Education.     Lab Results   Component Value Date    HGBA1C 7.5 (H) 07/07/2021         Wt 220 lb 9.6 oz (100.1 kg)   BMI 37.87 kg/m   Lavonne has continued to lose weight which she was congratulated on and commended for the hard work that she  has put forth she is now down a total of 18 pounds    Goals     ? activity      Incorporate 30 minutes moderate exercise/ activity into each day  6/11/2021        ? medication      Take diabetes medications as prescribed   6/11/2021        ? monitoring      Test BG with rotating test times each day.  6/9/2021      ? nutrition       Eat 3 balanced meals each day - Monitor carb intake and limit to 45-60 grams or 3-4 choices per meal    Do not wait longer than 4-5 hours to eat something  Snacks  1-2 choices  ( 15 - 30 grams)  6/9/2021              Follow up:   Primary care visit  CDE (certified diabetic educator)      Education:     Monitoring   Meter (per above goals): Assessed and Discussed  Monitoring: Assessed, Discussed and Competent  BG goals: Assessed, Discussed and Literature provided    Nutrition Management  Nutrition Management: Assessed and Discussed  Weight: Assessed, Discussed and Doing well with weight loss  Portions/Balance: Assessed and Discussed  Carb ID/Count: Assessed and Discussed  Label Reading: Assessed and Discussed  Heart Healthy Fats: Discussed  Menu Planning: Assessed and Discussed  Dining Out: Discussed  Physical Activity: Assessed and Discussed  Medications: Assessed and Discussed  Orals: Assessed and Discussed  Injected Medications: Discussed       Diabetes Disease Process: Assessed and Discussed    Acute Complications: Prevent, Detect, Treat:  Hypoglycemia: Assessed and Discussed  Hyperglycemia: Assessed, Discussed and Needs instruction/review at follow-up  Sick Days: Not addressed  Driving: Not addressed    Chronic Complications  Foot Care:Assessed, Discussed and Noted sore on great toe-advised her to contact her podiatrist for further evaluation  Skin Care: Discussed  Eye: Discussed  ABC: Assessed  Teeth:Not addressed  Goal Setting and Problem Solving: Assessed and Discussed  Barriers: Assessed and Discussed  Psychosocial Adjustments: Assessed and Discussed      Time spent with the  patient: 60 minutes for diabetes education and counseling.   Previous Education: Yes  Visit Type: DSMT        Connie Reza RN CDCES  Diabetes Education  7/9/2021    Much or all of the text in this note was generated through the use of the Dragon Dictate voice-to-text software. Errors in spelling or words which seem out of context are unintentional. Sound alike errors, in particular, may have escaped editing.  Any diabetes medication dose changes were made via the CDE Protocol. A copy of this encounter was shared with the provider.

## 2021-07-26 ENCOUNTER — TRANSFERRED RECORDS (OUTPATIENT)
Dept: HEALTH INFORMATION MANAGEMENT | Facility: CLINIC | Age: 61
End: 2021-07-26

## 2021-07-31 DIAGNOSIS — I10 ESSENTIAL HYPERTENSION: ICD-10-CM

## 2021-08-02 RX ORDER — AMLODIPINE BESYLATE 10 MG/1
TABLET ORAL
Qty: 90 TABLET | OUTPATIENT
Start: 2021-08-02

## 2021-09-13 DIAGNOSIS — E11.65 TYPE 2 DIABETES MELLITUS WITH HYPERGLYCEMIA, WITHOUT LONG-TERM CURRENT USE OF INSULIN (H): Primary | ICD-10-CM

## 2021-09-14 NOTE — TELEPHONE ENCOUNTER
"Last Written Prescription Date:  8/28/2020  Last Fill Quantity: 360,  # refills: 3   Last office visit provider:  7/7/21 has upcoming 10/4/21    Requested Prescriptions   Pending Prescriptions Disp Refills     metFORMIN (GLUCOPHAGE) 1000 MG tablet 180 tablet 3     Sig: Take 1 tablet (1,000 mg) by mouth 2 times daily (with meals)       Biguanide Agents Failed - 9/13/2021 10:28 AM        Failed - Recent (6 mo) or future (30 days) visit within the authorizing provider's specialty     Patient had office visit in the last 6 months or has a visit in the next 30 days with authorizing provider or within the authorizing provider's specialty.  See \"Patient Info\" tab in inbasket, or \"Choose Columns\" in Meds & Orders section of the refill encounter.            Passed - Patient is age 10 or older        Passed - Patient has documented A1c within the specified period of time.     If HgbA1C is 8 or greater, it needs to be on file within the past 3 months.  If less than 8, must be on file within the past 6 months.     Recent Labs   Lab Test 07/07/21  1506   A1C 7.5*             Passed - Patient's CR is NOT>1.4 OR Patient's EGFR is NOT<45 within past 12 mos.     Recent Labs   Lab Test 12/14/20  1315   GFRESTIMATED >60   GFRESTBLACK >60       Recent Labs   Lab Test 12/14/20  1315   CR 0.64             Passed - Patient does NOT have a diagnosis of CHF.        Passed - Medication is active on med list        Passed - Patient is not pregnant        Passed - Patient has not had a positive pregnancy test within the past 12 mos.              Shea Nguyen RN 09/13/21 11:41 PM  "

## 2021-10-26 DIAGNOSIS — I10 ESSENTIAL HYPERTENSION: ICD-10-CM

## 2021-10-26 DIAGNOSIS — E78.2 MIXED HYPERLIPIDEMIA: Primary | ICD-10-CM

## 2021-10-26 RX ORDER — ATORVASTATIN CALCIUM 10 MG/1
10 TABLET, FILM COATED ORAL DAILY
Qty: 90 TABLET | Refills: 0 | Status: SHIPPED | OUTPATIENT
Start: 2021-10-26 | End: 2022-01-25

## 2021-10-26 RX ORDER — AMLODIPINE BESYLATE 10 MG/1
10 TABLET ORAL DAILY
Qty: 90 TABLET | Refills: 1 | OUTPATIENT
Start: 2021-10-26

## 2021-10-26 NOTE — TELEPHONE ENCOUNTER
Reason for Call:  Other call back    Detailed comments: pt is out of medication for:  Atorvastatin  Amlodipine    Pharm:  [facility] - [address]    Phone Number Patient can be reached at: Cell number on file:    Telephone Information:   Mobile [phone]       Best Time: anytime    Can we leave a detailed message on this number? YES    Call taken on 10/26/2021 at 3:23 PM by Tomasa Cazares

## 2021-11-05 DIAGNOSIS — I10 HTN (HYPERTENSION): Primary | ICD-10-CM

## 2021-11-05 DIAGNOSIS — I10 ESSENTIAL HYPERTENSION: ICD-10-CM

## 2021-11-05 NOTE — TELEPHONE ENCOUNTER
Dr. Leroy will not fill metoprolol until Carlene has a blood pressure check up in the clinic.    Writer was unable to reach patient on cell and home phone number.

## 2021-11-05 NOTE — TELEPHONE ENCOUNTER
Pending Prescriptions:                       Disp   Refills    metoprolol succinate ER (TOPROL-XL) 100 M*90 tab*3            Sig: Take 1 tablet (100 mg) by mouth daily    Dr. Leroy last discussed hypertension with patient on 5/25/21.

## 2021-11-05 NOTE — TELEPHONE ENCOUNTER
Reason for Call:  Medication or medication refill:    Do you use a [facility]?  Name of the pharmacy and phone number for the current request:  [facility]    Name of the medication requested:   metoprolol succinate ER (TOPROL-XL) 100 MG 24 hr tablet     --   Sig - Route: Take by mouth daily - Oral         Other request: no response from pharm    Can we leave a detailed message on this number? YES    Phone number patient can be reached at: Cell number on file:    Telephone Information:   Mobile [phone]       Best Time: any    Call taken on 11/5/2021 at 9:42 AM by Pam J. Behr

## 2021-11-08 RX ORDER — METOPROLOL SUCCINATE 100 MG/1
100 TABLET, EXTENDED RELEASE ORAL DAILY
Qty: 30 TABLET | Refills: 0 | Status: SHIPPED | OUTPATIENT
Start: 2021-11-08 | End: 2021-11-29

## 2021-11-08 RX ORDER — AMLODIPINE BESYLATE 10 MG/1
10 TABLET ORAL DAILY
Qty: 30 TABLET | Refills: 0 | Status: SHIPPED | OUTPATIENT
Start: 2021-11-08 | End: 2021-11-29

## 2021-11-08 NOTE — TELEPHONE ENCOUNTER
Writer spoke to Carlene.  She is now scheduled for in person office visit with Dr. Leroy on 11/29/21 for blood pressure check up.  She is out of metoprolol and amlodipine at this time.

## 2021-11-20 DIAGNOSIS — E11.65 TYPE 2 DIABETES MELLITUS WITH HYPERGLYCEMIA, WITHOUT LONG-TERM CURRENT USE OF INSULIN (H): ICD-10-CM

## 2021-11-23 NOTE — TELEPHONE ENCOUNTER
"Routing refill request to provider for review/approval because:  Early refill requested.    Last Written Prescription Date:  11/4/21  Last Fill Quantity: 180,  # refills: 0   Last office visit provider:  5/25/21     Requested Prescriptions   Pending Prescriptions Disp Refills     metFORMIN (GLUCOPHAGE) 1000 MG tablet [Pharmacy Med Name: METFORMIN 1000MG TABLETS] 180 tablet 0     Sig: TAKE 1 TABLET(1000 MG) BY MOUTH TWICE DAILY WITH MEALS       Biguanide Agents Passed - 11/20/2021 11:41 AM        Passed - Patient is age 10 or older        Passed - Patient has documented A1c within the specified period of time.     If HgbA1C is 8 or greater, it needs to be on file within the past 3 months.  If less than 8, must be on file within the past 6 months.     Recent Labs   Lab Test 07/07/21  1506   A1C 7.5*             Passed - Patient's CR is NOT>1.4 OR Patient's EGFR is NOT<45 within past 12 mos.     Recent Labs   Lab Test 12/14/20  1315   GFRESTIMATED >60   GFRESTBLACK >60       Recent Labs   Lab Test 12/14/20  1315   CR 0.64             Passed - Patient does NOT have a diagnosis of CHF.        Passed - Medication is active on med list        Passed - Patient is not pregnant        Passed - Patient has not had a positive pregnancy test within the past 12 mos.         Passed - Recent (6 mo) or future (30 days) visit within the authorizing provider's specialty     Patient had office visit in the last 6 months or has a visit in the next 30 days with authorizing provider or within the authorizing provider's specialty.  See \"Patient Info\" tab in inbasket, or \"Choose Columns\" in Meds & Orders section of the refill encounter.                 Kade Mancuso RN 11/23/21 7:42 AM  "

## 2021-11-29 ENCOUNTER — OFFICE VISIT (OUTPATIENT)
Dept: FAMILY MEDICINE | Facility: CLINIC | Age: 61
End: 2021-11-29
Payer: COMMERCIAL

## 2021-11-29 VITALS
HEART RATE: 69 BPM | SYSTOLIC BLOOD PRESSURE: 138 MMHG | DIASTOLIC BLOOD PRESSURE: 58 MMHG | OXYGEN SATURATION: 99 % | WEIGHT: 227.2 LBS | BODY MASS INDEX: 39 KG/M2

## 2021-11-29 DIAGNOSIS — E11.65 TYPE 2 DIABETES MELLITUS WITH HYPERGLYCEMIA, WITHOUT LONG-TERM CURRENT USE OF INSULIN (H): ICD-10-CM

## 2021-11-29 DIAGNOSIS — I10 ESSENTIAL HYPERTENSION: ICD-10-CM

## 2021-11-29 DIAGNOSIS — I10 PRIMARY HYPERTENSION: ICD-10-CM

## 2021-11-29 LAB
CREAT UR-MCNC: 42 MG/DL
HBA1C MFR BLD: 6.6 % (ref 0–5.6)
MICROALBUMIN UR-MCNC: 1.21 MG/DL (ref 0–1.99)
MICROALBUMIN/CREAT UR: 28.8 MG/G CR

## 2021-11-29 PROCEDURE — 36415 COLL VENOUS BLD VENIPUNCTURE: CPT | Performed by: STUDENT IN AN ORGANIZED HEALTH CARE EDUCATION/TRAINING PROGRAM

## 2021-11-29 PROCEDURE — 80053 COMPREHEN METABOLIC PANEL: CPT | Performed by: STUDENT IN AN ORGANIZED HEALTH CARE EDUCATION/TRAINING PROGRAM

## 2021-11-29 PROCEDURE — 90471 IMMUNIZATION ADMIN: CPT | Performed by: STUDENT IN AN ORGANIZED HEALTH CARE EDUCATION/TRAINING PROGRAM

## 2021-11-29 PROCEDURE — 91300 COVID-19,PF,PFIZER (12+ YRS): CPT | Performed by: STUDENT IN AN ORGANIZED HEALTH CARE EDUCATION/TRAINING PROGRAM

## 2021-11-29 PROCEDURE — 99214 OFFICE O/P EST MOD 30 MIN: CPT | Mod: 25 | Performed by: STUDENT IN AN ORGANIZED HEALTH CARE EDUCATION/TRAINING PROGRAM

## 2021-11-29 PROCEDURE — 0004A COVID-19,PF,PFIZER (12+ YRS): CPT | Performed by: STUDENT IN AN ORGANIZED HEALTH CARE EDUCATION/TRAINING PROGRAM

## 2021-11-29 PROCEDURE — 82043 UR ALBUMIN QUANTITATIVE: CPT | Performed by: STUDENT IN AN ORGANIZED HEALTH CARE EDUCATION/TRAINING PROGRAM

## 2021-11-29 PROCEDURE — 83036 HEMOGLOBIN GLYCOSYLATED A1C: CPT | Performed by: STUDENT IN AN ORGANIZED HEALTH CARE EDUCATION/TRAINING PROGRAM

## 2021-11-29 PROCEDURE — 90682 RIV4 VACC RECOMBINANT DNA IM: CPT | Performed by: STUDENT IN AN ORGANIZED HEALTH CARE EDUCATION/TRAINING PROGRAM

## 2021-11-29 RX ORDER — GLIPIZIDE 10 MG/1
20 TABLET, FILM COATED, EXTENDED RELEASE ORAL
COMMUNITY
Start: 2021-06-09 | End: 2022-12-13

## 2021-11-29 RX ORDER — METOPROLOL SUCCINATE 100 MG/1
100 TABLET, EXTENDED RELEASE ORAL DAILY
Qty: 90 TABLET | Refills: 3 | Status: SHIPPED | OUTPATIENT
Start: 2021-11-29 | End: 2022-12-29

## 2021-11-29 RX ORDER — LISINOPRIL 10 MG/1
20 TABLET ORAL DAILY
Qty: 90 TABLET | Refills: 0 | Status: SHIPPED | OUTPATIENT
Start: 2021-11-29 | End: 2021-11-29

## 2021-11-29 RX ORDER — LISINOPRIL 20 MG/1
20 TABLET ORAL DAILY
Qty: 90 TABLET | Refills: 0 | Status: SHIPPED | OUTPATIENT
Start: 2021-11-29 | End: 2022-03-01

## 2021-11-29 RX ORDER — AMLODIPINE BESYLATE 10 MG/1
10 TABLET ORAL DAILY
Qty: 90 TABLET | Refills: 3 | Status: SHIPPED | OUTPATIENT
Start: 2021-11-29 | End: 2022-12-20

## 2021-11-29 RX ORDER — GABAPENTIN 100 MG/1
100 CAPSULE ORAL 3 TIMES DAILY
Qty: 90 CAPSULE | Refills: 3 | Status: SHIPPED | OUTPATIENT
Start: 2021-11-29 | End: 2022-04-19

## 2021-11-29 NOTE — PROGRESS NOTES
ASSESSMENT AND PLAN     Carlene was seen today for follow up.    Diagnoses and all orders for this visit:    Primary hypertension: Patient following up for elevated blood pressure today.  Patient currently taking metoprolol amlodipine and lisinopril.  Blood pressure improved but not at guideline.  Discussed treating to American Academy of cardiology's guidelines.  Patient in agreement this plan.  Will increase lisinopril to 20 mg daily and have her follow-up for recheck in 4 weeks  -     Discontinue: lisinopril (ZESTRIL) 10 MG tablet; Take 2 tablets (20 mg) by mouth daily  -     metoprolol succinate ER (TOPROL-XL) 100 MG 24 hr tablet; Take 1 tablet (100 mg) by mouth daily  -     lisinopril (ZESTRIL) 20 MG tablet; Take 1 tablet (20 mg) by mouth daily    Essential hypertension  -     amLODIPine (NORVASC) 10 MG tablet; Take 1 tablet (10 mg) by mouth daily  -     Comprehensive metabolic panel (BMP + Alb, Alk Phos, ALT, AST, Total. Bili, TP); Future  -     Comprehensive metabolic panel (BMP + Alb, Alk Phos, ALT, AST, Total. Bili, TP)    Type 2 diabetes mellitus with hyperglycemia, without long-term current use of insulin (H): Patient has made lifestyle changes since our last visit in hopes that her blood sugars will be improved that she does not need additional treatments.  Fastings look beautiful.  We will have her check an A1c today and treat based on that.  -     metFORMIN (GLUCOPHAGE) 1000 MG tablet; Take 1 tablet (1,000 mg) by mouth 2 times daily (with meals)  -     gabapentin (NEURONTIN) 100 MG capsule; Take 1 capsule (100 mg) by mouth 3 times daily  -     Albumin Random Urine Quantitative with Creat Ratio; Future  -     Hemoglobin A1c; Future  -     Albumin Random Urine Quantitative with Creat Ratio  -     Hemoglobin A1c    Other orders  -     COVID-19,PF,PFIZER (12+ Yrs PURPLE LABEL)  -     Cancel: INFLUENZA VACCINE IM >6 MO VALENT IIV4 (ALFURIA/FLUZONE)  -     IL RIV4 (FLUBLOK) VACCINE RECOMBINANT DNA PRSRV  ANTIBIO FREE,     RTC in 1 to 2 months for blood pressure check or annual wellness visit or sooner if develops new or worsening symptoms.    Lisa Leroy DO           SUBJECTIVE       Carleneashley Agee is a 61 year old  female with a PMH significant for:     Patient Active Problem List   Diagnosis     Obesity     HTN (hypertension)     Osteoarthritis Of The Knee     Type 2 diabetes, HbA1c goal < 7% (H)     Morbid obesity (H)     She presents for follow-up of hypertension and diabetes.    Patients antihypertensive were increased at her last visit.  She was advised to follow-up in 4 weeks for a blood pressure recheck.  It has been multiple months since then.  Patient did run out of her medication for a while but has been back on it with a short prescription prior to follow-up.  Patient notes she has been taking her meds daily.  Patient does not take her blood pressure at home.  Blood pressure under 140/90 today but discussed adequate control being between 120 and 130.  Patient in agreement to treat to American Academy of cardiology guidelines.  We will increase her lisinopril today.  Advised patient to continue her amlodipine and metoprolol at her current prescription.    At last visit A1c was noted to be above goal.  Patient was advised to start GLP-1.  Patient noted this to be quite expensive and was unable to  the medication.  Patient saw diabetes education.  They talked through lots of diet changes.  Patient has noted a improvement in her morning fasting blood sugars.  Discussed that this is very encouraging and congratulated her on her current success.  Patient in agreement to recheck A1c today and treat based on current results.    PMH, Medications and Allergies were reviewed and updated as needed.        REVIEW OF SYSTEMS     Pertinent items are noted in HPI.        OBJECTIVE     Vitals:    11/29/21 1522 11/29/21 1526   BP: (!) 140/66 138/58   Pulse: 69    SpO2: 99%    Weight: 103.1 kg (227 lb 3.2  oz)      Body mass index is 39 kg/m .     Constitutional: Awake, alert, cooperative, no apparent distress, and appears stated age.  Eyes: Lids and lashes normal, sclera clear, conjunctiva normal.  ENT: Normocephalic, without obvious abnormality, atramatic,   Musculoskeletal: No redness, warmth, or swelling of the joints.  Full range of motion noted.   Neurologic: Awake, alert, oriented to name, place and time.  Cranial nerves II-XII are grossly intact.    No results found for this or any previous visit (from the past 24 hour(s)).

## 2021-11-29 NOTE — PATIENT INSTRUCTIONS
Patient Education     Low-Salt Diet  This diet removes foods that are high in salt. It also limits the amount of salt you use when cooking. It is most often used for people with high blood pressure, fluid retention (edema), and kidney, liver, or heart disease.   Table salt has the mineral sodium. Your body needs sodium to work normally. But too much sodium can make your health problems worse. Your healthcare provider advises a low-salt (low-sodium) diet for you. Your total daily allowed amount of salt is 1,500 to 2,300 milligrams (mg). This is less than 1 teaspoon of table salt. This means you can have only about 500 to 700 mg of sodium at each meal. People with certain health problems should limit salt intake to the lower end of the advised range.     When you cook, don t add much salt. If you can cook without using salt, even better. Don t add salt to your food at the table.   When shopping, read food labels. Salt is often called sodium on the label. Choose foods that are salt-free, low salt, or very low salt. Note that foods with reduced salt may not lower your salt intake enough.     Beans, potatoes, and pasta  OK: Dry beans, split peas, lentils, potatoes, rice, macaroni, pasta, spaghetti with no added salt, canned beans with no added salt   Avoid: Salted potato chips; regular (salt added) canned beans   Breads and grains  OK: Low-sodium breads, rolls, cereals, and cakes; low-salt crackers, matzo crackers   Avoid: Salted crackers, pretzels, tortilla chips, popcorn, and other salty snacks; Swedish toast, pancakes, muffins, regular bread   Dairy  OK: Milk, chocolate milk, hot chocolate mix, low-salt cheeses, yogurt   Avoid: Processed cheese and cheese spreads; Roquefort, Camembert, and cottage cheese; buttermilk, instant breakfast drink   Desserts  OK: Ice cream, frozen yogurt, juice bars, gelatin, cookies and pies, sugar, honey, jelly, hard candy   Avoid: Most pies, cakes and cookies made with salt; instant  pudding   Drinks  OK: Tea, coffee, fizzy (carbonated) drinks, juices   Avoid: Flavored coffees, electrolyte replacement drinks, sports drinks   Meats  OK: All fresh meat, fish, poultry, low-salt tuna, eggs, egg substitute   Avoid: Smoked, pickled, brine-cured, or salted meats and fish, and processed poultry injected with salt or marinade. This includes kwong, chipped beef, corned beef, hot dogs, deli meats, ham, kosher meats, salt pork, sausage, canned tuna, salted codfish, smoked salmon, herring, sardines, and anchovies.   Seasonings  OK: Most seasonings are okay. Good substitutes for salt include: fresh herb blends, hot sauce, lemon, garlic, blanchard, vinegar, dry mustard, parsley, cilantro, horseradish, tomato paste, margarine, mayonnaise, unsalted butter, cream cheese, vegetable and olive oil, cream, low-salt salad dressing and gravy.   Avoid: Regular ketchup, relishes, pickles, soy sauce, teriyaki sauce, Worcestershire sauce, BBQ sauce, tartar sauce, meat tenderizer, chili sauce, regular gravy, regular salad dressing, salted butter   Soups  OK: Low-salt soups and broths made with allowed foods   Avoid: Bouillon cubes, soups with smoked or salted meats, regular soup and broth   Vegetables  OK: Most vegetables are okay, including canned vegetables with no salt added, and plain frozen vegetables; low-salt tomato and vegetable juices   Avoid: Sauerkraut and other brined vegetables; pickles and pickled vegetables; tomato juice, olives, canned vegetables with salt, frozen vegetables with sauces   [employer] last reviewed this educational content on 2/1/2020 2000-2021 [employer]. All rights reserved. This information is not intended as a substitute for professional medical care. Always follow your healthcare professional's instructions.

## 2021-11-30 LAB
ALBUMIN SERPL-MCNC: 3.7 G/DL (ref 3.5–5)
ALP SERPL-CCNC: 66 U/L (ref 45–120)
ALT SERPL W P-5'-P-CCNC: 26 U/L (ref 0–45)
ANION GAP SERPL CALCULATED.3IONS-SCNC: 12 MMOL/L (ref 5–18)
AST SERPL W P-5'-P-CCNC: 15 U/L (ref 0–40)
BILIRUB SERPL-MCNC: 0.3 MG/DL (ref 0–1)
BUN SERPL-MCNC: 14 MG/DL (ref 8–22)
CALCIUM SERPL-MCNC: 10 MG/DL (ref 8.5–10.5)
CHLORIDE BLD-SCNC: 104 MMOL/L (ref 98–107)
CO2 SERPL-SCNC: 24 MMOL/L (ref 22–31)
CREAT SERPL-MCNC: 0.72 MG/DL (ref 0.6–1.1)
GFR SERPL CREATININE-BSD FRML MDRD: 89 ML/MIN/1.73M2
GLUCOSE BLD-MCNC: 194 MG/DL (ref 70–125)
POTASSIUM BLD-SCNC: 4.7 MMOL/L (ref 3.5–5)
PROT SERPL-MCNC: 6.9 G/DL (ref 6–8)
SODIUM SERPL-SCNC: 140 MMOL/L (ref 136–145)

## 2021-12-01 ENCOUNTER — TELEPHONE (OUTPATIENT)
Dept: FAMILY MEDICINE | Facility: CLINIC | Age: 61
End: 2021-12-01
Payer: COMMERCIAL

## 2021-12-01 NOTE — TELEPHONE ENCOUNTER
----- Message from Alejandra PERALTA Izzy sent at 12/1/2021  1:58 PM CST -----    ----- Message -----  From: Lisa Leroy DO  Sent: 12/1/2021  10:15 AM CST  To: Lisa Leroy Care Team Pool    Attempted to call patient with labs.  She did not .  She prefers her mobile. please call patient with their results. Please let her know her kidney function appears stable.  She does have some leaking of protein into her urine.  Because of this we should continue to increase her lisinopril medication to protect the kidneys.  We just recently increase it at her last visit.  Her A1c looks beautiful!!!!  Continue the good work.  It is currently within goal meaning we do not need to do any extra medications at this time.  I am very proud of her!    Lisa Leroy DO  [facility]

## 2021-12-09 PROCEDURE — 87070 CULTURE OTHR SPECIMN AEROBIC: CPT | Mod: ORL | Performed by: PODIATRIST

## 2021-12-10 ENCOUNTER — LAB REQUISITION (OUTPATIENT)
Dept: LAB | Facility: CLINIC | Age: 61
End: 2021-12-10
Payer: COMMERCIAL

## 2021-12-10 DIAGNOSIS — L03.031 CELLULITIS OF RIGHT TOE: ICD-10-CM

## 2021-12-12 LAB — BACTERIA SPEC CULT: ABNORMAL

## 2022-01-09 DIAGNOSIS — I10 PRIMARY HYPERTENSION: Primary | ICD-10-CM

## 2022-01-11 RX ORDER — LISINOPRIL 40 MG/1
TABLET ORAL
Qty: 90 TABLET | Refills: 1 | Status: SHIPPED | OUTPATIENT
Start: 2022-01-11 | End: 2022-04-19

## 2022-01-12 NOTE — TELEPHONE ENCOUNTER
"Last Written Prescription Date:  11/29/21  Last Fill Quantity: 90,  # refills: 0   Last office visit provider:  7/7/21     Requested Prescriptions   Pending Prescriptions Disp Refills     lisinopril (ZESTRIL) 40 MG tablet [Pharmacy Med Name: LISINOPRIL 40MG TABLETS] 90 tablet      Sig: TAKE 1 TABLET(40 MG) BY MOUTH DAILY       ACE Inhibitors (Including Combos) Protocol Passed - 1/9/2022 11:47 PM        Passed - Blood pressure under 140/90 in past 12 months     BP Readings from Last 3 Encounters:   11/29/21 138/58   05/25/21 (!) 144/70   04/26/21 (!) 152/80                 Passed - Recent (12 mo) or future (30 days) visit within the authorizing provider's specialty     Patient has had an office visit with the authorizing provider or a provider within the authorizing providers department within the previous 12 mos or has a future within next 30 days. See \"Patient Info\" tab in inbasket, or \"Choose Columns\" in Meds & Orders section of the refill encounter.              Passed - Medication is active on med list        Passed - Patient is age 18 or older        Passed - No active pregnancy on record        Passed - Normal serum creatinine on file in past 12 months     Recent Labs   Lab Test 11/29/21  1622   CR 0.72       Ok to refill medication if creatinine is low          Passed - Normal serum potassium on file in past 12 months     Recent Labs   Lab Test 11/29/21  1622   POTASSIUM 4.7             Passed - No positive pregnancy test within past 12 months             gerri her RN 01/11/22 7:25 PM  "

## 2022-01-13 DIAGNOSIS — I10 PRIMARY HYPERTENSION: ICD-10-CM

## 2022-01-16 RX ORDER — LISINOPRIL 10 MG/1
TABLET ORAL
Qty: 90 TABLET | Refills: 0 | OUTPATIENT
Start: 2022-01-16

## 2022-01-16 NOTE — TELEPHONE ENCOUNTER
Outpatient Medication Detail     Disp Refills Start End MELBA   lisinopril (ZESTRIL) 10 MG tablet (Discontinued) 90 tablet 0 11/29/2021 11/29/2021 No   Sig - Route: Take 2 tablets (20 mg) by mouth daily - Oral   Sent to pharmacy as: Lisinopril 10 MG Oral Tablet (ZESTRIL)   Class: E-Prescribe   Reason for Discontinue: Reorder   Order: [id number]   E-Prescribing Status: Receipt confirmed by pharmacy (11/29/2021  3:57 PM CST)

## 2022-01-23 DIAGNOSIS — E78.2 MIXED HYPERLIPIDEMIA: ICD-10-CM

## 2022-01-25 RX ORDER — ATORVASTATIN CALCIUM 10 MG/1
TABLET, FILM COATED ORAL
Qty: 90 TABLET | Refills: 0 | Status: SHIPPED | OUTPATIENT
Start: 2022-01-25 | End: 2022-05-03

## 2022-01-25 NOTE — TELEPHONE ENCOUNTER
"Routing refill request to provider for review/approval because:  Labs not current:  LDL    Last Written Prescription Date:  10/26/21  Last Fill Quantity: 90,  # refills: 0   Last office visit provider:  11/29/21     Requested Prescriptions   Pending Prescriptions Disp Refills     atorvastatin (LIPITOR) 10 MG tablet [Pharmacy Med Name: ATORVASTATIN 10MG TABLETS] 90 tablet 0     Sig: TAKE 1 TABLET(10 MG) BY MOUTH DAILY       Statins Protocol Failed - 1/23/2022  2:35 AM        Failed - LDL on file in past 12 months     Recent Labs   Lab Test 12/14/20  1315   LDL 65             Passed - No abnormal creatine kinase in past 12 months     No lab results found.             Passed - Recent (12 mo) or future (30 days) visit within the authorizing provider's specialty     Patient has had an office visit with the authorizing provider or a provider within the authorizing providers department within the previous 12 mos or has a future within next 30 days. See \"Patient Info\" tab in inbasket, or \"Choose Columns\" in Meds & Orders section of the refill encounter.              Passed - Medication is active on med list        Passed - Patient is age 18 or older        Passed - No active pregnancy on record        Passed - No positive pregnancy test in past 12 months             Kade Mancuso RN 01/25/22 9:46 AM  "

## 2022-02-27 DIAGNOSIS — I10 PRIMARY HYPERTENSION: ICD-10-CM

## 2022-03-01 RX ORDER — LISINOPRIL 20 MG/1
20 TABLET ORAL DAILY
Qty: 90 TABLET | Refills: 2 | Status: SHIPPED | OUTPATIENT
Start: 2022-03-01 | End: 2022-11-30

## 2022-03-01 NOTE — TELEPHONE ENCOUNTER
"Last Written Prescription Date:  11/29/21  Last Fill Quantity: 90,  # refills: 0   Last office visit provider:  11/29/21     Requested Prescriptions   Pending Prescriptions Disp Refills     lisinopril (ZESTRIL) 20 MG tablet [Pharmacy Med Name: LISINOPRIL 20MG TABLETS] 90 tablet 0     Sig: TAKE 1 TABLET(20 MG) BY MOUTH DAILY       ACE Inhibitors (Including Combos) Protocol Passed - 3/1/2022  2:26 PM        Passed - Blood pressure under 140/90 in past 12 months     BP Readings from Last 3 Encounters:   11/29/21 138/58   05/25/21 (!) 144/70   04/26/21 (!) 152/80                 Passed - Recent (12 mo) or future (30 days) visit within the authorizing provider's specialty     Patient has had an office visit with the authorizing provider or a provider within the authorizing providers department within the previous 12 mos or has a future within next 30 days. See \"Patient Info\" tab in inbasket, or \"Choose Columns\" in Meds & Orders section of the refill encounter.              Passed - Medication is active on med list        Passed - Patient is age 18 or older        Passed - No active pregnancy on record        Passed - Normal serum creatinine on file in past 12 months     Recent Labs   Lab Test 11/29/21  1622   CR 0.72       Ok to refill medication if creatinine is low          Passed - Normal serum potassium on file in past 12 months     Recent Labs   Lab Test 11/29/21  1622   POTASSIUM 4.7             Passed - No positive pregnancy test within past 12 months             Kade Mancuso RN 03/01/22 2:26 PM  "

## 2022-04-19 ENCOUNTER — OFFICE VISIT (OUTPATIENT)
Dept: INTERNAL MEDICINE | Facility: CLINIC | Age: 62
End: 2022-04-19
Payer: COMMERCIAL

## 2022-04-19 VITALS
DIASTOLIC BLOOD PRESSURE: 64 MMHG | HEART RATE: 68 BPM | TEMPERATURE: 97.8 F | WEIGHT: 223 LBS | BODY MASS INDEX: 38.07 KG/M2 | HEIGHT: 64 IN | SYSTOLIC BLOOD PRESSURE: 122 MMHG | OXYGEN SATURATION: 97 %

## 2022-04-19 DIAGNOSIS — E11.621 DIABETIC ULCER OF RIGHT FOOT ASSOCIATED WITH TYPE 2 DIABETES MELLITUS, LIMITED TO BREAKDOWN OF SKIN, UNSPECIFIED PART OF FOOT (H): ICD-10-CM

## 2022-04-19 DIAGNOSIS — L97.511 DIABETIC ULCER OF RIGHT FOOT ASSOCIATED WITH TYPE 2 DIABETES MELLITUS, LIMITED TO BREAKDOWN OF SKIN, UNSPECIFIED PART OF FOOT (H): ICD-10-CM

## 2022-04-19 DIAGNOSIS — E11.9 TYPE 2 DIABETES, HBA1C GOAL < 7% (H): ICD-10-CM

## 2022-04-19 DIAGNOSIS — I10 PRIMARY HYPERTENSION: ICD-10-CM

## 2022-04-19 DIAGNOSIS — Z01.818 PREOPERATIVE EXAMINATION: Primary | ICD-10-CM

## 2022-04-19 DIAGNOSIS — E66.01 MORBID OBESITY (H): ICD-10-CM

## 2022-04-19 LAB
ANION GAP SERPL CALCULATED.3IONS-SCNC: 11 MMOL/L (ref 5–18)
ATRIAL RATE - MUSE: 73 BPM
BUN SERPL-MCNC: 16 MG/DL (ref 8–22)
CALCIUM SERPL-MCNC: 9.6 MG/DL (ref 8.5–10.5)
CHLORIDE BLD-SCNC: 101 MMOL/L (ref 98–107)
CO2 SERPL-SCNC: 25 MMOL/L (ref 22–31)
CREAT SERPL-MCNC: 0.72 MG/DL (ref 0.6–1.1)
DIASTOLIC BLOOD PRESSURE - MUSE: NORMAL MMHG
ERYTHROCYTE [DISTWIDTH] IN BLOOD BY AUTOMATED COUNT: 14.4 % (ref 10–15)
GFR SERPL CREATININE-BSD FRML MDRD: >90 ML/MIN/1.73M2
GLUCOSE BLD-MCNC: 209 MG/DL (ref 70–125)
HBA1C MFR BLD: 7.2 % (ref 0–5.6)
HCT VFR BLD AUTO: 35.2 % (ref 35–47)
HGB BLD-MCNC: 11.1 G/DL (ref 11.7–15.7)
INTERPRETATION ECG - MUSE: NORMAL
MCH RBC QN AUTO: 29.1 PG (ref 26.5–33)
MCHC RBC AUTO-ENTMCNC: 31.5 G/DL (ref 31.5–36.5)
MCV RBC AUTO: 92 FL (ref 78–100)
P AXIS - MUSE: 31 DEGREES
PLATELET # BLD AUTO: 298 10E3/UL (ref 150–450)
POTASSIUM BLD-SCNC: 4.1 MMOL/L (ref 3.5–5)
PR INTERVAL - MUSE: 152 MS
QRS DURATION - MUSE: 88 MS
QT - MUSE: 394 MS
QTC - MUSE: 434 MS
R AXIS - MUSE: -2 DEGREES
RBC # BLD AUTO: 3.82 10E6/UL (ref 3.8–5.2)
SODIUM SERPL-SCNC: 137 MMOL/L (ref 136–145)
SYSTOLIC BLOOD PRESSURE - MUSE: NORMAL MMHG
T AXIS - MUSE: 55 DEGREES
VENTRICULAR RATE- MUSE: 73 BPM
WBC # BLD AUTO: 7.1 10E3/UL (ref 4–11)

## 2022-04-19 PROCEDURE — 83036 HEMOGLOBIN GLYCOSYLATED A1C: CPT | Performed by: INTERNAL MEDICINE

## 2022-04-19 PROCEDURE — 99214 OFFICE O/P EST MOD 30 MIN: CPT | Performed by: INTERNAL MEDICINE

## 2022-04-19 PROCEDURE — 93010 ELECTROCARDIOGRAM REPORT: CPT | Performed by: INTERNAL MEDICINE

## 2022-04-19 PROCEDURE — 36415 COLL VENOUS BLD VENIPUNCTURE: CPT | Performed by: INTERNAL MEDICINE

## 2022-04-19 PROCEDURE — 93005 ELECTROCARDIOGRAM TRACING: CPT | Performed by: INTERNAL MEDICINE

## 2022-04-19 PROCEDURE — 85027 COMPLETE CBC AUTOMATED: CPT | Performed by: INTERNAL MEDICINE

## 2022-04-19 PROCEDURE — 80048 BASIC METABOLIC PNL TOTAL CA: CPT | Performed by: INTERNAL MEDICINE

## 2022-04-19 RX ORDER — GABAPENTIN 100 MG/1
100 CAPSULE ORAL 2 TIMES DAILY
COMMUNITY
End: 2022-05-16

## 2022-04-19 NOTE — PROGRESS NOTES
[facility]  [address] [place] [address]  Phone: [phone]  Fax: [phone]  Primary Provider: Lisa Leroy  Pre-op Performing Provider: NORMA IBARRA      PREOPERATIVE EVALUATION:  Today's date: 4/19/2022    Carlene Agee is a 62 year old female who presents for a preoperative evaluation.    Surgical Information:  Surgery/Procedure: FIRST MPJ FUSION; 2ND AND 3RD HAMMERTOE REPAIR WITH PIPJ FUSION; 2ND MPJ CAPSULE REBALANCING; 5TH DIGIT PIPJ ARTHROPLASTY  Sur[facility]  Surgeon: Dr Lluvia Larry  Surgery Date: 4/21/22  Time of Surgery: TBD  Where patient plans to recover: At home with family  Fax number for surgical facility: [phone]    Type of Anesthesia Anticipated: to be determined    1. Preoperative examination  Proceed with surgery as planned.  She was given perioperative med management instructions.  I did discuss with her the importance of good sugar control for healing.  - EKG 12-lead, tracing only  - CBC with platelets; Future  - Basic metabolic panel  (Ca, Cl, CO2, Creat, Gluc, K, Na, BUN); Future  - Hemoglobin A1c; Future    2. Diabetic ulcer of right foot associated with type 2 diabetes mellitus, limited to breakdown of skin, unspecified part of foot (H)  As above.    3. Type 2 diabetes, HbA1c goal < 7% (H)  She will hold her metformin perioperatively.  We discussed importance of good sugar control for healing.  - Hemoglobin A1c; Future    4. Morbid obesity (H)  She should follow-up with Dr. Jules ADDISON for general follow-up.    5. Primary hypertension  Blood pressure controlled.  I instructed her on how to take her antihypertensives perioperatively.    Subjective     HPI related to upcoming procedure: This is a patient of Dr. Vicente's with diabetes and morbid obesity who has considerable foot deformities.  She is going to undergo some definitive treatments for hammertoes and  bunions it sounds like by her podiatrist over at [facility].  She has a history of osteomyelitis requiring amputation of toes.  She is a .  Not terribly active.  No cardiopulmonary symptoms.  She tells me her sugars have been well controlled.    Preop Questions 4/19/2022   1. Have you ever had a heart attack or stroke? No   2. Have you ever had surgery on your heart or blood vessels, such as a stent placement, a coronary artery bypass, or surgery on an artery in your head, neck, heart, or legs? No   3. Do you have chest pain with activity? No   4. Do you have a history of  heart failure? No   5. Do you currently have a cold, bronchitis or symptoms of other infection? No   6. Do you have a cough, shortness of breath, or wheezing? No   7. Do you or anyone in your family have previous history of blood clots? No   8. Do you or does anyone in your family have a serious bleeding problem such as prolonged bleeding following surgeries or cuts? No   9. Have you ever had problems with anemia or been told to take iron pills? No   10. Have you had any abnormal blood loss such as black, tarry or bloody stools, or abnormal vaginal bleeding? No   11. Have you ever had a blood transfusion? No   12. Are you willing to have a blood transfusion if it is medically needed before, during, or after your surgery? Yes   13. Have you or any of your relatives ever had problems with anesthesia? No   14. Do you have sleep apnea, excessive snoring or daytime drowsiness? No   15. Do you have any artifical heart valves or other implanted medical devices like a pacemaker, defibrillator, or continuous glucose monitor? No   16. Do you have artificial joints? YES - Bilateral knees   17. Are you allergic to latex? No       Health Care Directive:  Patient does not have a Health Care Directive or Living Will: Discussed advance care planning with patient; however, patient declined at this time.    Preoperative Review of :    reviewed - no record of controlled substances prescribed.      Status of Chronic Conditions:  See problem list for active medical problems.  Problems all longstanding and stable, except as noted/documented.  See ROS for pertinent symptoms related to these conditions.      Review of Systems  Constitutional, neuro, ENT, endocrine, pulmonary, cardiac, gastrointestinal, genitourinary, musculoskeletal, integument and psychiatric systems are negative, except as otherwise noted.    Patient Active Problem List    Diagnosis Date Noted     Morbid obesity (H) 03/29/2021     Priority: Medium     HTN (hypertension)      Priority: Medium     Created by Conversion  Replacement Utility updated for latest IMO load         Type 2 diabetes, HbA1c goal < 7% (H) 07/31/2017     Priority: Medium     Osteoarthritis Of The Knee      Priority: Medium     Created by Conversion  Replacement Utility updated for latest IMO load    Replacing diagnoses that were inactivated after the 10/1/2021 regulatory import.       Obesity      Priority: Medium     Created by Conversion          Past Medical History:   Diagnosis Date     Diabetes (H)     type II      H/O: hysterectomy 08/22/2017     Hypertension      Type 2 diabetes mellitus without complication, without long-term current use of insulin (H)      Past Surgical History:   Procedure Laterality Date     ABDOMEN SURGERY      C SECTION X4     AMPUTATE TOE(S) Left 10/22/2019    Procedure: AMPUTATION, LEFT THIRD TOE;  Surgeon: Candelaria Simmons DPM;  Location: [facility] OR     AMPUTATE TOE(S)       ARTHROPLASTY KNEE BILATERAL       GYN SURGERY      HYSTERECTOMY      HYSTERECTOMY       ORTHOPEDIC SURGERY      bilateral total knee      Current Outpatient Medications   Medication Sig Dispense Refill     amLODIPine (NORVASC) 10 MG tablet Take 1 tablet (10 mg) by mouth daily 90 tablet 3     atorvastatin (LIPITOR) 10 MG tablet TAKE 1 TABLET(10 MG) BY MOUTH DAILY 90 tablet 0     gabapentin (NEURONTIN) 100 MG  "capsule Take 1 capsule (100 mg) by mouth 3 times daily 90 capsule 3     glipiZIDE (GLUCOTROL XL) 10 MG 24 hr tablet Take 20 mg by mouth daily before breakfast       lisinopril (ZESTRIL) 20 MG tablet Take 1 tablet (20 mg) by mouth daily 90 tablet 2     metFORMIN (GLUCOPHAGE) 1000 MG tablet Take 1 tablet (1,000 mg) by mouth 2 times daily (with meals) 180 tablet 3     metoprolol succinate ER (TOPROL-XL) 100 MG 24 hr tablet Take 1 tablet (100 mg) by mouth daily 90 tablet 3       Allergies   Allergen Reactions     Scallops [Seafood] Unknown        Social History     Tobacco Use     Smoking status: Never Smoker     Smokeless tobacco: Never Used   Substance Use Topics     Alcohol use: Yes     Comment: RARE      Family History   Problem Relation Age of Onset     Coronary Artery Disease Mother      Substance Abuse Brother      Cancer No family hx of      Cerebrovascular Disease No family hx of      History   Drug Use Unknown         Objective     Ht 1.626 m (5' 4\")   Wt 101.2 kg (223 lb)   BMI 38.28 kg/m      Physical Exam    GENERAL APPEARANCE: Obese pleasant woman in no distress.     EYES: EOMI, PERRL     HENT: ear canals and TM's normal and normal cephalic/atraumatic     NECK: no adenopathy, no asymmetry, masses, or scars and thyroid normal to palpation     RESP: lungs clear to auscultation - no rales, rhonchi or wheezes     CV: regular rates and rhythm, normal S1 S2, no S3 or S4 and no murmur, click or rub     ABDOMEN:  soft, nontender, no HSM or masses and bowel sounds normal     MS: Considerable abnormalities of the feet bilaterally.  Toes amputated bilaterally.  Significant bunion deformities.  Hammertoes with preulcerative callus distal toe on right foot.     SKIN: Preulcerative callus on foot.     NEURO: Normal strength and tone, sensory exam grossly normal, mentation intact and speech normal     PSYCH: mentation appears normal. and affect normal/bright     LYMPHATICS: No cervical adenopathy    Recent Labs   Lab " Test 11/29/21  1622 07/07/21  1506 03/29/21  1309 12/14/20  1315 08/10/20  1354   HGB  --   --   --  12.1 12.1   PLT  --   --   --  256 282     --   --  139  --    POTASSIUM 4.7  --   --  3.8  --    CR 0.72  --   --  0.64  --    A1C 6.6* 7.5*   < > 9.2* 8.8*    < > = values in this interval not displayed.        Diagnostics:  Labs pending at this time.  Results will be reviewed when available.   EKG: appears normal, NSR, normal axis, normal intervals, no acute ST/T changes c/w ischemia, no LVH by voltage criteria, unchanged from previous tracings    Revised Cardiac Risk Index (RCRI):  The patient has the following serious cardiovascular risks for perioperative complications:   - No serious cardiac risks = 0 points     RCRI Interpretation: 0 points: Class I (very low risk - 0.4% complication rate)           Signed Electronically by: NORMA IBARRA MD  Copy of this evaluation report is provided to requesting physician.

## 2022-04-19 NOTE — LETTER
April 20, 2022      Carlene ALBERTO Anuel  [address]    [facility] [address]        Dear ,    We are writing to inform you of your test results.    You are mildly anemic.  You should follow-up with your primary care provider to discuss and further evaluate.  Sugar control looks good.  Kidney function and electrolytes are normal.       Resulted Orders   CBC with platelets   Result Value Ref Range    WBC Count 7.1 4.0 - 11.0 10e3/uL    RBC Count 3.82 3.80 - 5.20 10e6/uL    Hemoglobin 11.1 (L) 11.7 - 15.7 g/dL    Hematocrit 35.2 35.0 - 47.0 %    MCV 92 78 - 100 fL    MCH 29.1 26.5 - 33.0 pg    MCHC 31.5 31.5 - 36.5 g/dL    RDW 14.4 10.0 - 15.0 %    Platelet Count 298 150 - 450 10e3/uL   Basic metabolic panel  (Ca, Cl, CO2, Creat, Gluc, K, Na, BUN)   Result Value Ref Range    Sodium 137 136 - 145 mmol/L    Potassium 4.1 3.5 - 5.0 mmol/L    Chloride 101 98 - 107 mmol/L    Carbon Dioxide (CO2) 25 22 - 31 mmol/L    Anion Gap 11 5 - 18 mmol/L    Urea Nitrogen 16 8 - 22 mg/dL    Creatinine 0.72 0.60 - 1.10 mg/dL    Calcium 9.6 8.5 - 10.5 mg/dL    Glucose 209 (H) 70 - 125 mg/dL    GFR Estimate >90 >60 mL/min/1.73m2      Comment:      Effective December 21, 2021 eGFRcr in adults is calculated using the 2021 CKD-EPI creatinine equation which includes age and gender (Quiana et al., NEJ, DOI: 10.1056/TYSAmq4700144)   Hemoglobin A1c   Result Value Ref Range    Hemoglobin A1C 7.2 (H) 0.0 - 5.6 %      Comment:      Normal <5.7%   Prediabetes 5.7-6.4%    Diabetes 6.5% or higher     Note: Adopted from ADA consensus guidelines.       If you have any questions or concerns, please call the clinic at the number listed above.       Sincerely,      Collin Mccarthy MD

## 2022-04-19 NOTE — PATIENT INSTRUCTIONS
Preparing for Your Surgery  Getting started  A nurse will call you to review your health history and instructions. They will give you an arrival time based on your scheduled surgery time. Please be ready to share:    Your doctor's clinic name and phone number    Your medical, surgical and anesthesia history    A list of allergies and sensitivities    A list of medicines, including herbal treatments and over-the-counter drugs    Whether the patient has a legal guardian (ask how to send us the papers in advance)  Please tell us if you're pregnant--or if there's any chance you might be pregnant. Some surgeries may injure a fetus (unborn baby), so they require a pregnancy test. Surgeries that are safe for a fetus don't always need a test, and you can choose whether to have one.   If you have a child who's having surgery, please ask for a copy of Preparing for Your Child's Surgery.    Preparing for surgery    Within 30 days of surgery: Have a pre-op exam (sometimes called an H&P, or History and Physical). This can be done at a clinic or pre-operative center.  ? If you're having a , you may not need this exam. Talk to your care team.    At your pre-op exam, talk to your care team about all medicines you take. If you need to stop any medicines before surgery, ask when to start taking them again.  ? We do this for your safety. Many medicines can make you bleed too much during surgery. Some change how well surgery (anesthesia) drugs work.    Call your insurance company to let them know you're having surgery. (If you don't have insurance, call [phone].)    Call your clinic if there's any change in your health. This includes signs of a cold or flu (sore throat, runny nose, cough, rash, fever). It also includes a scrape or scratch near the surgery site.    If you have questions on the day of surgery, call your hospital or surgery center.  COVID testing  You may need to be tested for COVID-19 before having  surgery. If so, your surgical team will give you instructions for scheduling this test, separate from your preoperative history and physical.  Eating and drinking guidelines  For your safety: Unless your surgeon tells you otherwise, follow the guidelines below.    Eat and drink as usual until 8 hours before surgery. After that, no food or milk.    Drink clear liquids until 2 hours before surgery. These are liquids you can see through, like water, Gatorade and Propel Water. You may also have black coffee and tea (no cream or milk).    Nothing by mouth within 2 hours of surgery. This includes gum, candy and breath mints.    If you drink alcohol: Stop drinking it the night before surgery.    If your care team tells you to take medicine on the morning of surgery, it's okay to take it with a sip of water.  Preventing infection    Shower or bathe the night before and morning of your surgery. Follow the instructions your clinic gave you. (If no instructions, use regular soap.)    Don't shave or clip hair near your surgery site. We'll remove the hair if needed.    Don't smoke or vape the morning of surgery. You may chew nicotine gum up to 2 hours before surgery. A nicotine patch is okay.  ? Note: Some surgeries require you to completely quit smoking and nicotine. Check with your surgeon.    Your care team will make every effort to keep you safe from infection. We will:  ? Clean our hands often with soap and water (or an alcohol-based hand rub).  ? Clean the skin at your surgery site with a special soap that kills germs.  ? Give you a special gown to keep you warm. (Cold raises the risk of infection.)  ? Wear special hair covers, masks, gowns and gloves during surgery.  ? Give antibiotic medicine, if prescribed. Not all surgeries need antibiotics.  What to bring on the day of surgery    Photo ID and insurance card    Copy of your health care directive, if you have one    Glasses and hearing aides (bring cases)  ? You can't  wear contacts during surgery    Inhaler and eye drops, if you use them (tell us about these when you arrive)    CPAP machine or breathing device, if you use them    A few personal items, if spending the night    If you have . . .  ? A pacemaker, ICD (cardiac defibrillator) or other implant: Bring the ID card.  ? An implanted stimulator: Bring the remote control.  ? A legal guardian: Bring a copy of the certified (court-stamped) guardianship papers.  Please remove any jewelry, including body piercings. Leave jewelry and other valuables at home.  If you're going home the day of surgery    You must have a responsible adult drive you home. They should stay with you overnight as well.    If you don't have someone to stay with you, and you aren't safe to go home alone, we may keep you overnight. Insurance often won't pay for this.  After surgery  If it's hard to control your pain or you need more pain medicine, please call your surgeon's office.  Questions?   If you have any questions for your care team, list them here: _________________________________________________________________________________________________________________________________________________________________________ ____________________________________ ____________________________________ ____________________________________  For informational purposes only. Not to replace the advice of your health care provider. Copyright   2003, 2019 [facility]. All rights reserved. Clinically reviewed by Sanam Swenson MD. [employer] [id number] - REV 07/21.    How to Take Your Medication Before Surgery  - HOLD (do not take) Aspirin for 7 days   Hold your metformin the day prior to surgery and the day of surgery.    The evening prior to surgery please take your normal evening medications except for metformin.  Morning of surgery you should take your metoprolol and gabapentin with a sip of water.   Hold all other medicines.

## 2022-04-30 DIAGNOSIS — E78.2 MIXED HYPERLIPIDEMIA: ICD-10-CM

## 2022-04-30 DIAGNOSIS — E11.9 TYPE 2 DIABETES, HBA1C GOAL < 7% (H): Primary | ICD-10-CM

## 2022-05-03 RX ORDER — ATORVASTATIN CALCIUM 10 MG/1
TABLET, FILM COATED ORAL
Qty: 90 TABLET | Refills: 0 | Status: SHIPPED | OUTPATIENT
Start: 2022-05-03 | End: 2022-08-02

## 2022-05-03 RX ORDER — GLIPIZIDE 10 MG/1
TABLET ORAL
Qty: 180 TABLET | Refills: 1 | Status: SHIPPED | OUTPATIENT
Start: 2022-05-03 | End: 2022-09-22

## 2022-05-03 NOTE — TELEPHONE ENCOUNTER
"Routing refill request to provider for review/approval because:  Labs not current:  LDL    Last Written Prescription Date:  1/25/22  Last Fill Quantity: 90,  # refills: 0   Last office visit provider:  4/19/22     Requested Prescriptions   Pending Prescriptions Disp Refills     atorvastatin (LIPITOR) 10 MG tablet [Pharmacy Med Name: ATORVASTATIN 10MG TABLETS] 90 tablet 0     Sig: TAKE 1 TABLET(10 MG) BY MOUTH DAILY       Statins Protocol Failed - 5/3/2022  7:17 AM        Failed - LDL on file in past 12 months     Recent Labs   Lab Test 12/14/20  1315   LDL 65             Passed - No abnormal creatine kinase in past 12 months     No lab results found.             Passed - Recent (12 mo) or future (30 days) visit within the authorizing provider's specialty     Patient has had an office visit with the authorizing provider or a provider within the authorizing providers department within the previous 12 mos or has a future within next 30 days. See \"Patient Info\" tab in inbasket, or \"Choose Columns\" in Meds & Orders section of the refill encounter.              Passed - Medication is active on med list        Passed - Patient is age 18 or older        Passed - No active pregnancy on record        Passed - No positive pregnancy test in past 12 months         Signed Prescriptions Disp Refills    glipiZIDE (GLUCOTROL) 10 MG tablet 180 tablet 1     Sig: TAKE 1 TABLET BY MOUTH TWICE DAILY 30 MINUTES BEFORE MEALS       Sulfonylurea Agents Passed - 5/3/2022  7:17 AM        Passed - Patient has documented A1c within the specified period of time.     If HgbA1C is 8 or greater, it needs to be on file within the past 3 months.  If less than 8, must be on file within the past 6 months.     Recent Labs   Lab Test 04/19/22  1433   A1C 7.2*             Passed - Medication is active on med list        Passed - Patient is age 18 or older        Passed - No active pregnancy on record        Passed - Patient has a recent creatinine (normal) " "within the past 12 mos.     Recent Labs   Lab Test 04/19/22  1433   CR 0.72       Ok to refill medication if creatinine is low          Passed - Patient has not had a positive pregnancy test within the past 12 mos.        Passed - Recent (6 mo) or future (30 days) visit within the authorizing provider's specialty     Patient had office visit in the last 6 months or has a visit in the next 30 days with authorizing provider or within the authorizing provider's specialty.  See \"Patient Info\" tab in inbasket, or \"Choose Columns\" in Meds & Orders section of the refill encounter.                 Kade Mancuso RN 05/03/22 7:25 AM    "

## 2022-05-03 NOTE — TELEPHONE ENCOUNTER
"Outpatient Medication Detail     Disp Refills Start End MELBA   glipiZIDE (GLUCOTROL XL) 10 MG 24 hr tablet 180 tablet 2 6/9/2021  --   Sig - Route: Take 2 tablets (20 mg total) by mouth daily with breakfast. - Oral   Sent to pharmacy as: glipiZIDE ER 10 mg tablet, extended release 24 hr (GLUCOTROL XL)   Cosign for Ordering: Accepted by Lisa Leroy DO on 6/9/2021  3:59 PM   E-Prescribing Status: Receipt confirmed by pharmacy (6/9/2021  2:30 PM CDT)       Last office visit provider:  4/19/22     Requested Prescriptions   Pending Prescriptions Disp Refills     atorvastatin (LIPITOR) 10 MG tablet [Pharmacy Med Name: ATORVASTATIN 10MG TABLETS] 90 tablet 0     Sig: TAKE 1 TABLET(10 MG) BY MOUTH DAILY       Statins Protocol Failed - 5/3/2022  7:17 AM        Failed - LDL on file in past 12 months     Recent Labs   Lab Test 12/14/20  1315   LDL 65             Passed - No abnormal creatine kinase in past 12 months     No lab results found.             Passed - Recent (12 mo) or future (30 days) visit within the authorizing provider's specialty     Patient has had an office visit with the authorizing provider or a provider within the authorizing providers department within the previous 12 mos or has a future within next 30 days. See \"Patient Info\" tab in inbasket, or \"Choose Columns\" in Meds & Orders section of the refill encounter.              Passed - Medication is active on med list        Passed - Patient is age 18 or older        Passed - No active pregnancy on record        Passed - No positive pregnancy test in past 12 months           glipiZIDE (GLUCOTROL) 10 MG tablet [Pharmacy Med Name: GLIPIZIDE 10MG TABLETS] 180 tablet      Sig: TAKE 1 TABLET BY MOUTH TWICE DAILY 30 MINUTES BEFORE MEALS       Sulfonylurea Agents Passed - 5/3/2022  7:17 AM        Passed - Patient has documented A1c within the specified period of time.     If HgbA1C is 8 or greater, it needs to be on file within the past 3 months.  If " "less than 8, must be on file within the past 6 months.     Recent Labs   Lab Test 04/19/22  1433   A1C 7.2*             Passed - Medication is active on med list        Passed - Patient is age 18 or older        Passed - No active pregnancy on record        Passed - Patient has a recent creatinine (normal) within the past 12 mos.     Recent Labs   Lab Test 04/19/22  1433   CR 0.72       Ok to refill medication if creatinine is low          Passed - Patient has not had a positive pregnancy test within the past 12 mos.        Passed - Recent (6 mo) or future (30 days) visit within the authorizing provider's specialty     Patient had office visit in the last 6 months or has a visit in the next 30 days with authorizing provider or within the authorizing provider's specialty.  See \"Patient Info\" tab in inbasket, or \"Choose Columns\" in Meds & Orders section of the refill encounter.                 Kade Mancuso RN 05/03/22 7:18 AM    "

## 2022-05-15 DIAGNOSIS — E11.9 TYPE 2 DIABETES, HBA1C GOAL < 7% (H): Primary | ICD-10-CM

## 2022-05-16 NOTE — TELEPHONE ENCOUNTER
Routing refill request to provider for review/approval because:  Drug not on the [facility] refill protocol   Medication is reported/historical    Thomas Connors RN  [facility]

## 2022-05-16 NOTE — TELEPHONE ENCOUNTER
Outpatient Medication Detail     Disp Refills Start End MELBA   gabapentin (NEURONTIN) 300 MG capsule 270 capsule 2 2/28/2021  No   Sig: TAKE 1 CAPSULE(300 MG) BY MOUTH THREE TIMES DAILY.   Sent to pharmacy as: gabapentin 300 mg capsule (NEURONTIN)   E-Prescribing Status: Receipt confirmed by pharmacy (2/28/2021  1:43 PM CST)       gabapentin (NEURONTIN) 300 MG capsule [[id number]]    Electronically signed by: Kade Mancuso RN on 02/28/21 1342 Status: Active   Ordering user: Kade Mancuso RN 02/28/21 1342 Ordering provider: Angela Cervantes MD   Authorized by: Angela Cervantes MD   Frequency:  02/28/21 - Until Discontinued Released by: Kade Mancuso RN 02/28/21 1342   Diagnoses  Neuropathy [G62.9]     Routing refill request to provider for review/approval because:  Drug not on the [facility] refill protocol     Last office visit provider:  4/19/22     Requested Prescriptions   Pending Prescriptions Disp Refills     gabapentin (NEURONTIN) 100 MG capsule [Pharmacy Med Name: GABAPENTIN 100MG CAPSULES] 90 capsule      Sig: TAKE 1 CAPSULE(100 MG) BY MOUTH THREE TIMES DAILY       There is no refill protocol information for this order          Kade Mancuso RN 05/16/22 2:06 PM

## 2022-05-18 RX ORDER — GABAPENTIN 100 MG/1
CAPSULE ORAL
Qty: 90 CAPSULE | Refills: 0 | Status: SHIPPED | OUTPATIENT
Start: 2022-05-18 | End: 2022-07-05

## 2022-09-01 DIAGNOSIS — E11.9 TYPE 2 DIABETES, HBA1C GOAL < 7% (H): Primary | ICD-10-CM

## 2022-09-01 DIAGNOSIS — E11.9 TYPE 2 DIABETES, HBA1C GOAL < 8% (H): ICD-10-CM

## 2022-09-02 RX ORDER — BLOOD SUGAR DIAGNOSTIC
STRIP MISCELLANEOUS
Qty: 100 STRIP | Refills: 0 | Status: SHIPPED | OUTPATIENT
Start: 2022-09-02 | End: 2022-11-30

## 2022-09-02 NOTE — TELEPHONE ENCOUNTER
"Outpatient Medication Detail     Disp Refills Start End MELBA   ACCU-CHEK GUIDE TEST STRIPS strips 100 strip 3 6/24/2021  No   Sig: USE TO TEST ONCE DAILY   Sent to pharmacy as: Accu-Chek Guide test strips   E-Prescribing Status: Receipt confirmed by pharmacy (6/24/2021 10:19 PM CDT)     Last office visit provider:  4/19/22     Requested Prescriptions   Pending Prescriptions Disp Refills     ACCU-CHEK GUIDE test strip [Pharmacy Med Name: ACCU-CHEK GUIDE TEST STRIPS 100S] 100 strip      Sig: TEST DAILY       Diabetic Supplies Protocol Failed - 9/2/2022  1:43 PM        Failed - Medication is active on med list        Failed - Recent (6 mo) or future (30 days) visit within the authorizing provider's specialty     Patient had office visit in the last 6 months or has a visit in the next 30 days with authorizing provider.  See \"Patient Info\" tab in inbasket, or \"Choose Columns\" in Meds & Orders section of the refill encounter.            Passed - Patient is 18 years of age or older             Kade Mancuso RN 09/02/22 1:43 PM  "

## 2022-09-22 ENCOUNTER — NURSE TRIAGE (OUTPATIENT)
Dept: NURSING | Facility: CLINIC | Age: 62
End: 2022-09-22

## 2022-09-22 ENCOUNTER — VIRTUAL VISIT (OUTPATIENT)
Dept: FAMILY MEDICINE | Facility: CLINIC | Age: 62
End: 2022-09-22
Payer: COMMERCIAL

## 2022-09-22 DIAGNOSIS — E11.42 TYPE 2 DIABETES MELLITUS WITH DIABETIC POLYNEUROPATHY, WITHOUT LONG-TERM CURRENT USE OF INSULIN (H): Primary | ICD-10-CM

## 2022-09-22 DIAGNOSIS — Z23 NEED FOR VACCINATION: ICD-10-CM

## 2022-09-22 DIAGNOSIS — U07.1 INFECTION DUE TO 2019 NOVEL CORONAVIRUS: ICD-10-CM

## 2022-09-22 DIAGNOSIS — Z12.31 VISIT FOR SCREENING MAMMOGRAM: ICD-10-CM

## 2022-09-22 DIAGNOSIS — Z13.220 SCREENING FOR HYPERLIPIDEMIA: ICD-10-CM

## 2022-09-22 DIAGNOSIS — Z12.11 SCREEN FOR COLON CANCER: ICD-10-CM

## 2022-09-22 DIAGNOSIS — Z23 NEED FOR IMMUNIZATION AGAINST INFLUENZA: ICD-10-CM

## 2022-09-22 DIAGNOSIS — I10 HYPERTENSION GOAL BP (BLOOD PRESSURE) < 140/90: ICD-10-CM

## 2022-09-22 PROBLEM — M20.11 HALLUX VALGUS OF RIGHT FOOT: Status: RESOLVED | Noted: 2022-03-31 | Resolved: 2022-09-22

## 2022-09-22 PROBLEM — M20.41 HAMMER TOE OF RIGHT FOOT: Status: ACTIVE | Noted: 2022-03-31

## 2022-09-22 PROBLEM — M20.11 HALLUX VALGUS OF RIGHT FOOT: Status: ACTIVE | Noted: 2022-03-31

## 2022-09-22 PROBLEM — M20.41 HAMMER TOE OF RIGHT FOOT: Status: RESOLVED | Noted: 2022-03-31 | Resolved: 2022-09-22

## 2022-09-22 PROCEDURE — 99214 OFFICE O/P EST MOD 30 MIN: CPT | Mod: CS | Performed by: FAMILY MEDICINE

## 2022-09-22 RX ORDER — ASPIRIN 81 MG/1
TABLET, CHEWABLE ORAL
COMMUNITY
Start: 2022-04-21

## 2022-09-22 RX ORDER — LISINOPRIL 10 MG/1
TABLET ORAL
COMMUNITY
Start: 2021-12-01 | End: 2022-09-22

## 2022-09-22 RX ORDER — PSEUDOEPHED/ACETAMINOPH/DIPHEN 30MG-500MG
TABLET ORAL
COMMUNITY
Start: 2022-04-21

## 2022-09-22 RX ORDER — ATORVASTATIN CALCIUM 10 MG/1
10 TABLET, FILM COATED ORAL
COMMUNITY
Start: 2021-10-26 | End: 2022-09-22

## 2022-09-22 NOTE — PROGRESS NOTES
"Carlene is a 62 year old who is being evaluated via a billable telephone visit.      What phone number would you like to be contacted at? [phone]  How would you like to obtain your AVS? Mail a copy    ____________________________    Virtual Visit - PHONE Encounter  [facility]  Date of Service: 9/22/2022    Subjective:  Chief Complaint   Patient presents with     covid postive      Want referred for treatments       Covid tested  Just really tired  Tested at home.  Cough  MASSBP Score 9/22/2022   Age Greater than or equal to 65 years 0   BMI greater than or equal to 35 kg/m2 2   Has Diabetes Mellitus 2   Has Chronic Kidney Disease 0   Has Cardiovascular Disease and 55 years or older 0   Has Chronic Respiratory Disease and 55 years or older 0   Has Hypertension and 55 years or older 1   Is Immunocompromised 0   Is Pregnant 0   Member of BIPOC community (Black/, /, ,  or , or  or [place] Native)  0   MASSBP Score 5   Has the patient had a positive COVID test outside our system?  Yes   What day did symptoms start?  9/20/2022       Estimated body mass index is 38.28 kg/m  as calculated from the following:    Height as of 4/19/22: 1.626 m (5' 4\").    Weight as of 4/19/22: 101.2 kg (223 lb).     GFR Estimate   Date Value Ref Range Status   04/19/2022 >90 >60 mL/min/1.73m2 Final     Comment:     Effective December 21, 2021 eGFRcr in adults is calculated using the 2021 CKD-EPI creatinine equation which includes age and gender (Quiana et al., NEJM, DOI: 10.1056/DDXVcr7498153)   12/14/2020 >60 >60 mL/min/1.73m2 Final        FDA Facts Sheet  Lexicomp Drug Interaction review    Medications were reviewed with the patient and held or adjusted where applicable.  -HOLD atorvastatin  -discussed amlodipine increased effect    Current Outpatient Medications   Medication     ACCU-CHEK GUIDE test strip     " amLODIPine (NORVASC) 10 MG tablet     atorvastatin (LIPITOR) 10 MG tablet     gabapentin (NEURONTIN) 100 MG capsule     glipiZIDE (GLUCOTROL XL) 10 MG 24 hr tablet     lisinopril (ZESTRIL) 20 MG tablet     metFORMIN (GLUCOPHAGE) 1000 MG tablet     metoprolol succinate ER (TOPROL-XL) 100 MG 24 hr tablet     ACETAMINOPHEN EXTRA STRENGTH 500 MG tablet     aspirin (ASA) 81 MG chewable tablet     No current facility-administered medications for this visit.       Objective:   Occasional coughing.   No visits with results within 1 Week(s) from this visit.   Latest known visit with results is:   Office Visit on 04/19/2022   Component Date Value Ref Range Status     Ventricular Rate 04/19/2022 73  BPM Final     Atrial Rate 04/19/2022 73  BPM Final     NJ Interval 04/19/2022 152  ms Final     QRS Duration 04/19/2022 88  ms Final     QT 04/19/2022 394  ms Final     QTc 04/19/2022 434  ms Final     P Axis 04/19/2022 31  degrees Final     R AXIS 04/19/2022 -2  degrees Final     T Axis 04/19/2022 55  degrees Final     Interpretation ECG 04/19/2022    Final                    Value:Sinus rhythm  Normal ECG  When compared with ECG of 10-AUG-2020 13:45,  No significant change was found  Confirmed by JACOB MISTRY MD LOC:[facility] ([id number]) on 4/19/2022 4:42:30 PM       WBC Count 04/19/2022 7.1  4.0 - 11.0 10e3/uL Final     RBC Count 04/19/2022 3.82  3.80 - 5.20 10e6/uL Final     Hemoglobin 04/19/2022 11.1 (A) 11.7 - 15.7 g/dL Final     Hematocrit 04/19/2022 35.2  35.0 - 47.0 % Final     MCV 04/19/2022 92  78 - 100 fL Final     MCH 04/19/2022 29.1  26.5 - 33.0 pg Final     MCHC 04/19/2022 31.5  31.5 - 36.5 g/dL Final     RDW 04/19/2022 14.4  10.0 - 15.0 % Final     Platelet Count 04/19/2022 298  150 - 450 10e3/uL Final     Sodium 04/19/2022 137  136 - 145 mmol/L Final     Potassium 04/19/2022 4.1  3.5 - 5.0 mmol/L Final     Chloride 04/19/2022 101  98 - 107 mmol/L Final     Carbon Dioxide (CO2) 04/19/2022 25  22 - 31 mmol/L Final     Anion  Gap 04/19/2022 11  5 - 18 mmol/L Final     Urea Nitrogen 04/19/2022 16  8 - 22 mg/dL Final     Creatinine 04/19/2022 0.72  0.60 - 1.10 mg/dL Final     Calcium 04/19/2022 9.6  8.5 - 10.5 mg/dL Final     Glucose 04/19/2022 209 (A) 70 - 125 mg/dL Final     GFR Estimate 04/19/2022 >90  >60 mL/min/1.73m2 Final    Effective December 21, 2021 eGFRcr in adults is calculated using the 2021 CKD-EPI creatinine equation which includes age and gender (Quiana et al., NEJ, DOI: 10.1056/CNUWrr5361300)     Hemoglobin A1C 04/19/2022 7.2 (A) 0.0 - 5.6 % Final    Normal <5.7%   Prediabetes 5.7-6.4%    Diabetes 6.5% or higher     Note: Adopted from ADA consensus guidelines.     No results found.   Assessment & Plan:  1. Covid infection. Day #1 of symptoms. Risk factors present. Rx paxlovid. Discussed symptomatic care and isolation.      Order Summary                                                      Carlene was seen today for covid postive .    Diagnoses and all orders for this visit:    Type 2 diabetes mellitus with diabetic polyneuropathy, without long-term current use of insulin (H)  -     Adult Eye  Referral; Future    Screen for colon cancer  -     Colonoscopy Screening  Referral; Future    Visit for screening mammogram  -     MA SCREENING DIGITAL BILAT - Future  (s+30); Future    Screening for hyperlipidemia  -     Lipid panel reflex to direct LDL Non-fasting; Future    Need for vaccination  -     Pneumococcal 20 Valent Conjugate (Prevnar 20); Future    Need for immunization against influenza  -     INFLUENZA QUAD, RECOMBINANT, P-FREE (RIV4) (FLUBLOK) AGE 50-64 [ZCX412]; Future    Hypertension goal BP (blood pressure) < 140/90    Other orders  -     REVIEW OF HEALTH MAINTENANCE PROTOCOL ORDERS  -     ZOSTER VACCINE RECOMBINANT ADJUVANTED (SHINGRIX); Future        Future Appointments   Date Time Provider Department Center   9/22/2022  5:00 PM [facility]asmin Last [facility]   12/13/2022 12:20 PM Mariaa  Lisa PERALTA DO [facility]       Completed by: Jasmin Last M.D., [facility]. 9/22/2022 4:19 PM.  This transcription uses voice recognition software, which may contain typographical errors.  ____________________________  Start visit: 4:19 PM  End visit: 4:49 PM

## 2022-09-22 NOTE — PATIENT INSTRUCTIONS
COVID-19 Outpatient Treatments  Your care team can help you find the best treatments for COVID-19. Talk to a health care provider or refer to the FDA medicine fact sheets below.    Important: You CAN'T have molnupiravir or Paxlovid if you are starting the medicine more than 5 days after your symptoms have started.  Paxlovid: https://www.fda.gov/media/000825/download  Molnupiravir: https://www.fda.gov/media/066802/download  Monoclonal antibodies: https://combatcovid.hhs.gov/what-are-monoclonal-antibodies  Paxlovid (nimatrelvir and ritonavir)  How it works  Two medicines (nirmatrelvir and ritonavir) are taken together. They stop the virus from growing. Less amount of virus is easier for your body to fight.  Benefits  Lowers risk of a hospital stay or death from COVID-19.  How to take    Medicine comes in a daily container with both medicine tablets. Take by mouth twice daily (once in the morning, once at night) for 5 days.    The number of tablets to take varies by patient.    Don't chew or break capsules. Swallow whole.  When to take  Take as soon as possible after positive COVID-19 test result, and within 5 days of your first symptoms.  Who can take it  Patients must be 12 years or older, weigh at least 88 pounds, and have tested positive for COVID-19. This is the preferred treatment for pregnant patients.  Possible side effects  Can cause altered sense of taste, diarrhea (loose, watery stools), high blood pressure, muscle aches.  Medicine conflicts    Some medicines may conflict with Paxlovid and may cause serious side effects.    Tell your care team about all the medicines you take, including prescription and over-the-counter medicines, vitamins and herbal supplements.    Your provider will review your medicines to make sure that you can safely take Paxlovid.  Cautions    Paxlovid is not advised for patients with severe kidney or liver disease. If you have kidney or liver problems, the dose may need to be  changed.    If you are pregnant or breastfeeding, talk to your care team about your options.    If you are sexually active, use trusted birth control while taking Paxlovid.  Molnupiravir  How it works  Stops the virus from growing. Less amount of virus is easier for your body to fight.  Benefits  Lowers risk of a hospital stay or death from COVID-19.  How to take  Take 4 capsules by mouth every 12 hours (4 in the morning and 4 at night) for 5 days. Don't chew or break capsules. Swallow whole.  When to take  Take as soon as possible after positive COVID-19 test result, and within 5 days of your first symptoms.  Who can take it  Patients must be 18 years or older and have tested positive for COVID-19.  Possible side effects  Diarrhea (loose, watery stools), nausea (feeling sick to your stomach), dizziness, headaches.  Medicine conflicts  Right now, there are no known conflicts with other drugs. But tell your care team all medicines you take.  Cautions    This is not advised for patients who are pregnant.    Patients who could become pregnant should use trusted birth control until 4 days after their last dose.    Sexually active people of any gender should use trusted birth control for 3 months after their last dose.  Monoclonal antibodies  How it works  Monoclonal antibodies can detect pieces of the COVID virus and stop it from infecting your cells.  Benefits  Lowers risk of a hospital stay or death from COVID-19. Monoclonal antibodies are known to work well against the omicron variant.  How it is given to you  You will receive the treatment either by an infusion through your vein (IV) or shots.  When to take  Get as soon as possible after you test positive for COVID-19, and within 7 days of your first symptoms.  Who can take it  Patients must be 12 years or older, weigh at least 88 pounds and have tested positive for COVID-19.  Possible side effects  Fever, chills, diarrhea (loose, watery stools), dizziness,  itchiness and rash.  More serious side effects include: fever, difficulty breathing, low oxygen level in your blood, chills, tiredness, fast or slow heart rate, chest discomfort or pain, weakness, confusion, nausea, headache, shortness of breath, low or high blood pressure, wheezing, swelling of your lips, face, or throat, rash including hives, itching, muscle aches, dizziness, feeling faint and sweating.  If you receive an IV, you may have brief pain, bleeding and bruising of the skin, soreness, swelling and possible infection at the place where you get the IV needle.  Medicine conflicts  Please tell you care team other medicines you take so they can assess if there are any conflicts.  Cautions  Your doctor will talk with you about risks and benefits of this treatment and will help choose the best option for you.  For informational purposes only. Not to replace the advice of your health care provider.  Copyright   2022 [facility]. All rights reserved. Clinically reviewed by Zenaida Sevilla. Thing[employer] [id number] - 08/22.    Instructions for Patients      What are the symptoms of COVID-19?  Symptoms can include fever, cough, shortness of breath, chills, headache, muscle pain sore throat, fatigue, runny or stuffy nose, and loss of taste and smell. Other less common symptoms include nausea, vomiting, or diarrhea (watery stools).    Know when to call 911. Emergency warning signs include:    Trouble breathing or shortness of breath    Pain or pressure in the chest that doesn't go away    Feeling confused like you haven't felt before, or not being able to wake up    Bluish-colored lips or face    How can I take care of myself?  1. Get lots of rest. Drink extra fluids (unless a doctor has told you not to).  2. Take Tylenol (acetaminophen) for fever or pain. If you have liver or kidney problems, ask your family doctor if it's okay to take Tylenol   Adults:   650 mg (two 325 mg pills or tablets) every 4 to 6 hours,  or...   1,000 mg (two 500 mg pills or tablets) every 8 hours as needed.  Note: Don't take more than 3,000 mg in one day. Acetaminophen is found in many medicines (both prescribed and over the counter). Read all labels to be sure you don't take too much.  For children, check the Tylenol bottle for the right dose. The dose is based on the child's age or weight.  3. Take over the counter medicines for your symptoms as needed. Talk to your pharmacist.  4. If you have other health problems (like cancer, heart failure, an organ transplant, or severe kidney disease): Call your specialty clinic if you don't feel better in the next 2 days.    Where can I get more information?     [place] COVID-19 Resource Hub: www.Clixtr.org/covid19/     CDC Quarantine & Isolation: https://www.cdc.gov/coronavirus/2019-ncov/your-health/quarantine-isolation.html     CDC - What to Do If You're Sick: https://www.cdc.gov/coronavirus/2019-ncov/if-you-are-sick/index.html    [facility] clinical trials (COVID-19 research studies): clinicalaffairs.[facility]/umn-clinical-trials    [place] Department of Health COVID-19 Public Hotline: 1-838.907.1612

## 2022-09-22 NOTE — TELEPHONE ENCOUNTER
Nurse Triage SBAR    Is this a 2nd Level Triage? YES, LICENSED PRACTITIONER REVIEW IS REQUIRED    Situation: Covid positive    Background: Patient states her symptoms started this morning and she tested positive this morning. States she has a cough, feels like she has a fever but does not have a thermometer at home. States she feels tired. Denies chest pain or pressure, denies difficulty breathing and shortness of breath.     Assessment: Covid    Protocol Recommended Disposition:   Discuss With PCP And Callback By Nurse Within 1 Hour    Recommendation:     Patient is scheduled for a Paxlovid visit. Please provide any additional recommendations for this patient.      Routed to provider    NIKA DA SILVA RN      Does the patient meet one of the following criteria for ADS visit consideration? 16+ years old, with an [facility] PCP     TIP  Providers, please consider if this condition is appropriate for management at one of our Acute and Diagnostic Services sites.     If patient is a good candidate, please use dotphrase <dot>triageresponse and select Refer to ADS to document.    Reason for Disposition    HIGH RISK for severe COVID complications (e.g., weak immune system, age > 64 years, obesity with BMI > 25, pregnant, chronic lung disease or other chronic medical condition) (Exception: Already seen by PCP and no new or worsening symptoms.)    Additional Information    Negative: SEVERE difficulty breathing (e.g., struggling for each breath, speaks in single words)    Negative: Difficult to awaken or acting confused (e.g., disoriented, slurred speech)    Negative: Bluish (or gray) lips or face now    Negative: Shock suspected (e.g., cold/pale/clammy skin, too weak to stand, low BP, rapid pulse)    Negative: Sounds like a life-threatening emergency to the triager    Negative: [1] Diagnosed or suspected COVID-19 AND [2] symptoms lasting 3 or more weeks    Negative: [1] COVID-19 exposure AND [2] no symptoms    Negative:  "COVID-19 vaccine, questions about    Negative: COVID-19 vaccine reaction suspected (e.g., fever, headache, muscle aches) occurring 1 to 3 days after getting vaccine    Negative: [1] Lives with someone known to have influenza (flu test positive) AND [2] flu-like symptoms (e.g., cough, runny nose, sore throat, SOB; with or without fever)    Negative: [1] Adult with possible COVID-19 symptoms AND [2] triager concerned about severity of symptoms or other causes    Negative: COVID-19 and breastfeeding, questions about    Negative: SEVERE or constant chest pain or pressure  (Exception: Mild central chest pain, present only when coughing.)    Negative: MODERATE difficulty breathing (e.g., speaks in phrases, SOB even at rest, pulse 100-120)    Negative: Headache and stiff neck (can't touch chin to chest)    Negative: Oxygen level (e.g., pulse oximetry) 90 percent or lower    Negative: Chest pain or pressure    Negative: Patient sounds very sick or weak to the triager    Negative: MILD difficulty breathing (e.g., minimal/no SOB at rest, SOB with walking, pulse <100)    Negative: Fever > 103 F (39.4 C)    Negative: [1] Fever > 101 F (38.3 C) AND [2] over 60 years of age    Negative: [1] Fever > 100.0 F (37.8 C) AND [2] bedridden (e.g., nursing home patient, CVA, chronic illness, recovering from surgery)    Protocols used: CORONAVIRUS (COVID-19) DIAGNOSED OR [id number] 1.18.2022    Coronavirus (COVID-19) Notification    Caller Name (Patient, parent, daughter/son, grandparent, etc)  Carlene    Reason for call  Notify of Positive Coronavirus (COVID-19) lab results, assess symptoms,  review [facility] recommendations    Lab Result    Lab test:  2019-nCoV rRt-PCR or SARS-CoV-2 PCR    Oropharyngeal AND/OR nasopharyngeal swabs is POSITIVE for 2019-nCoV RNA/SARS-COV-2 PCR (COVID-19 virus)    Brief introduction script  Introduce self then review script:  \"I am calling on behalf of [employer].  We were notified that " "your Coronavirus test (COVID-19) for was POSITIVE for the virus.\"    Gather patient reported symptoms      Assessment  Current Symptoms at time of phone call, reported by patient: (if no symptoms, document No symptoms]    Cough, fatigue, fever  Date of Symptom(s) onset (if applicable)    9/22/2022    If at time of call, Patients symptoms hare worsened, the Patient should contact 911 or have someone drive them to Emergency Dept promptly:         If Patient calling 911, inform 911 personal that you have tested positive for the Coronavirus (COVID-19).  Place mask on and await 911 to arrive.       If Emergency Dept, If possible, please have another adult drive you to the Emergency Dept but you need to wear mask when in contact with other people.      Monoclonal Antibody Administration    You may be eligible to receive a new treatment with a monoclonal antibody for preventing hospitalization in patients at high risk for complications from COVID-19. This medication is still experimental and available on a limited basis; it is given through an IV and must be given at an infusion center. Please note that not all people who are eligible will receive the medication since it is in limited supply.  Is the patient symptomatic and onset of symptoms within the last 7 days?  Yes  Is the patient interested in a visit with a provider to discuss treatment options?: Yes  Is the patient seen at [facility]?  No: Warm transfer caller to [phone] to be scheduled with a virtual urgent provider.  During transfer, instruct  on appropriate time frame for visit     Review information with Patient    Your result was positive. This means you have COVID-19 (coronavirus).      How can I protect others?    These guidelines are for isolating before returning to work, school or .          If you DO have symptoms:      o    Stay home and away from others          ?    For at least 5 days after your symptoms started, AND "           ?    You are fever free for 24 hours (with no medicine that reduces fever), AND          ?    Your other symptoms are better.      o    Wear a mask for 10 full days any time you are around others.       If you DON'T have symptoms:      o    Stay at home and away from others for at least 5 days after your positive test.      o    Wear a mask for 10 full days any time you are around others.    There may be different guidelines for healthcare facilities. Please check with the specific sites before arriving.     If you've been told by a doctor that you were severely ill with COVID-19 or are immunocompromised, you should isolate for at least 10 days.    You should not go back to work until you meet the guidelines above for ending your home isolation. You don't need to be retested for COVID-19 before going back to work--studies show that you won't spread the virus if it's been at least 10 days since your symptoms started (or 20 days, if you have a weak immune system).    Employers, schools, and daycares: This is an official notice for this person's medical guidelines for returning in-person. They must meet the above guidelines before going back to work, school, or  in person.    You will receive a positive COVID-19 letter via [employer] or the mail soon with additional self-care information.      Would you like me to review some of that information with you now?  Yes    How can I take care of myself?      Get lots of rest. Drink extra fluids (unless a doctor has told you not to).      Take Tylenol (acetaminophen) for fever or pain. If you have liver or kidney problems, ask your family doctor if it's okay to take Tylenol.     Take either:     650 mg (two 325 mg pills) every 4 to 6 hours, or     1,000 mg (two 500 mg pills) every 8 hours as needed.     Note: Do not take more than 3,000 mg in one day. Acetaminophen is found in many medicines (both prescribed and over-the-counter medicines). Read all labels to be  sure you don't take too much.    For children, check the Tylenol bottle for the right dose (based on their age or weight).      If you have other health problems (like cancer, heart failure, an organ transplant or severe kidney disease): Call your specialty clinic if you don't feel better in the next 2 days.      Know when to call 911: Emergency warning signs include:    Trouble breathing or shortness of breath    Pain or pressure in the chest that doesn't go away    Feeling confused like you haven't felt before, or not being able to wake up    Bluish-colored lips or face        If you were tested for an upcoming procedure, please contact your provider for next steps.     NIKA DA SILVA RN

## 2022-09-29 NOTE — LETTER
9/29/2022       RE: Carlene Agee  [address]  [address]  [place] [id number]     Dear Colleague,     Thank you for your referral to the [facility]. We have contacted the patient by phone, MyChart messaging and/or letter and have been unable to schedule at this time.        If you have any questions or concerns, please contact our office at Dept: [phone].           Sincerely,  [facility]

## 2022-10-04 NOTE — PROGRESS NOTES
Lvm #3 @ #[phone], we had made several attempts and was unsuccessful at getting a hold of pt.

## 2022-11-29 DIAGNOSIS — I10 PRIMARY HYPERTENSION: ICD-10-CM

## 2022-11-29 DIAGNOSIS — E11.9 TYPE 2 DIABETES, HBA1C GOAL < 8% (H): ICD-10-CM

## 2022-11-30 RX ORDER — BLOOD SUGAR DIAGNOSTIC
STRIP MISCELLANEOUS
Qty: 100 STRIP | Refills: 0 | Status: SHIPPED | OUTPATIENT
Start: 2022-11-30 | End: 2023-02-28

## 2022-11-30 RX ORDER — LISINOPRIL 20 MG/1
TABLET ORAL
Qty: 90 TABLET | Refills: 0 | Status: SHIPPED | OUTPATIENT
Start: 2022-11-30 | End: 2023-02-28

## 2022-11-30 NOTE — TELEPHONE ENCOUNTER
"Last Written Prescription Date:  3/1/22  Last Fill Quantity: 90,  # refills: 2   Last office visit provider:  9/22/22     Requested Prescriptions   Pending Prescriptions Disp Refills     lisinopril (ZESTRIL) 20 MG tablet [Pharmacy Med Name: LISINOPRIL 20MG TABLETS] 90 tablet 2     Sig: TAKE 1 TABLET(20 MG) BY MOUTH DAILY       ACE Inhibitors (Including Combos) Protocol Passed - 11/29/2022  8:55 PM        Passed - Blood pressure under 140/90 in past 12 months     BP Readings from Last 3 Encounters:   04/19/22 122/64   11/29/21 138/58   05/25/21 (!) 144/70                 Passed - Recent (12 mo) or future (30 days) visit within the authorizing provider's specialty     Patient has had an office visit with the authorizing provider or a provider within the authorizing providers department within the previous 12 mos or has a future within next 30 days. See \"Patient Info\" tab in inbasket, or \"Choose Columns\" in Meds & Orders section of the refill encounter.              Passed - Medication is active on med list        Passed - Patient is age 18 or older        Passed - No active pregnancy on record        Passed - Normal serum creatinine on file in past 12 months     Recent Labs   Lab Test 04/19/22  1433   CR 0.72       Ok to refill medication if creatinine is low          Passed - Normal serum potassium on file in past 12 months     Recent Labs   Lab Test 04/19/22  1433   POTASSIUM 4.1             Passed - No positive pregnancy test within past 12 months           ACCU-CHEK GUIDE test strip [Pharmacy Med Name: ACCU-CHEK GUIDE TEST STRIPS 100S] 100 strip 0     Sig: USE TO TEST BLOOD SUGARS DAILY AS DIRECTED.       Diabetic Supplies Protocol Passed - 11/29/2022  8:55 PM        Passed - Medication is active on med list        Passed - Patient is 18 years of age or older        Passed - Recent (6 mo) or future (30 days) visit within the authorizing provider's specialty     Patient had office visit in the last 6 months or has " "a visit in the next 30 days with authorizing provider.  See \"Patient Info\" tab in inbasket, or \"Choose Columns\" in Meds & Orders section of the refill encounter.                 Alexa Moses RN 11/30/22 1:10 PM  " Ambulatory

## 2022-12-13 ENCOUNTER — OFFICE VISIT (OUTPATIENT)
Dept: FAMILY MEDICINE | Facility: CLINIC | Age: 62
End: 2022-12-13
Payer: COMMERCIAL

## 2022-12-13 VITALS
OXYGEN SATURATION: 98 % | HEIGHT: 64 IN | WEIGHT: 234.4 LBS | RESPIRATION RATE: 16 BRPM | SYSTOLIC BLOOD PRESSURE: 110 MMHG | HEART RATE: 82 BPM | BODY MASS INDEX: 40.02 KG/M2 | DIASTOLIC BLOOD PRESSURE: 59 MMHG | TEMPERATURE: 97.9 F

## 2022-12-13 DIAGNOSIS — Z00.00 ROUTINE GENERAL MEDICAL EXAMINATION AT A HEALTH CARE FACILITY: Primary | ICD-10-CM

## 2022-12-13 DIAGNOSIS — Z12.11 SCREEN FOR COLON CANCER: ICD-10-CM

## 2022-12-13 DIAGNOSIS — E11.42 DIABETIC POLYNEUROPATHY ASSOCIATED WITH TYPE 2 DIABETES MELLITUS (H): ICD-10-CM

## 2022-12-13 DIAGNOSIS — D64.9 ANEMIA, UNSPECIFIED TYPE: ICD-10-CM

## 2022-12-13 LAB
CHOLEST SERPL-MCNC: 152 MG/DL
CREAT UR-MCNC: 213 MG/DL
ERYTHROCYTE [DISTWIDTH] IN BLOOD BY AUTOMATED COUNT: 14.6 % (ref 10–15)
HBA1C MFR BLD: 8.3 % (ref 0–5.6)
HCT VFR BLD AUTO: 35.6 % (ref 35–47)
HDLC SERPL-MCNC: 45 MG/DL
HGB BLD-MCNC: 11.4 G/DL (ref 11.7–15.7)
LDLC SERPL CALC-MCNC: 64 MG/DL
MCH RBC QN AUTO: 29.5 PG (ref 26.5–33)
MCHC RBC AUTO-ENTMCNC: 32 G/DL (ref 31.5–36.5)
MCV RBC AUTO: 92 FL (ref 78–100)
MICROALBUMIN UR-MCNC: 73.8 MG/L
MICROALBUMIN/CREAT UR: 34.65 MG/G CR (ref 0–25)
NONHDLC SERPL-MCNC: 107 MG/DL
PLATELET # BLD AUTO: 307 10E3/UL (ref 150–450)
RBC # BLD AUTO: 3.87 10E6/UL (ref 3.8–5.2)
TRIGL SERPL-MCNC: 215 MG/DL
WBC # BLD AUTO: 6.4 10E3/UL (ref 4–11)

## 2022-12-13 PROCEDURE — 99396 PREV VISIT EST AGE 40-64: CPT | Mod: 25 | Performed by: STUDENT IN AN ORGANIZED HEALTH CARE EDUCATION/TRAINING PROGRAM

## 2022-12-13 PROCEDURE — 83036 HEMOGLOBIN GLYCOSYLATED A1C: CPT | Performed by: STUDENT IN AN ORGANIZED HEALTH CARE EDUCATION/TRAINING PROGRAM

## 2022-12-13 PROCEDURE — 90677 PCV20 VACCINE IM: CPT | Performed by: STUDENT IN AN ORGANIZED HEALTH CARE EDUCATION/TRAINING PROGRAM

## 2022-12-13 PROCEDURE — 99214 OFFICE O/P EST MOD 30 MIN: CPT | Mod: 25 | Performed by: STUDENT IN AN ORGANIZED HEALTH CARE EDUCATION/TRAINING PROGRAM

## 2022-12-13 PROCEDURE — 90472 IMMUNIZATION ADMIN EACH ADD: CPT | Performed by: STUDENT IN AN ORGANIZED HEALTH CARE EDUCATION/TRAINING PROGRAM

## 2022-12-13 PROCEDURE — 90682 RIV4 VACC RECOMBINANT DNA IM: CPT | Performed by: STUDENT IN AN ORGANIZED HEALTH CARE EDUCATION/TRAINING PROGRAM

## 2022-12-13 PROCEDURE — 82043 UR ALBUMIN QUANTITATIVE: CPT | Performed by: STUDENT IN AN ORGANIZED HEALTH CARE EDUCATION/TRAINING PROGRAM

## 2022-12-13 PROCEDURE — 80061 LIPID PANEL: CPT | Performed by: STUDENT IN AN ORGANIZED HEALTH CARE EDUCATION/TRAINING PROGRAM

## 2022-12-13 PROCEDURE — 90471 IMMUNIZATION ADMIN: CPT | Performed by: STUDENT IN AN ORGANIZED HEALTH CARE EDUCATION/TRAINING PROGRAM

## 2022-12-13 PROCEDURE — 36415 COLL VENOUS BLD VENIPUNCTURE: CPT | Performed by: STUDENT IN AN ORGANIZED HEALTH CARE EDUCATION/TRAINING PROGRAM

## 2022-12-13 PROCEDURE — 85027 COMPLETE CBC AUTOMATED: CPT | Performed by: STUDENT IN AN ORGANIZED HEALTH CARE EDUCATION/TRAINING PROGRAM

## 2022-12-13 PROCEDURE — 69209 REMOVE IMPACTED EAR WAX UNI: CPT | Mod: 50 | Performed by: STUDENT IN AN ORGANIZED HEALTH CARE EDUCATION/TRAINING PROGRAM

## 2022-12-13 RX ORDER — GLIPIZIDE 10 MG/1
20 TABLET, FILM COATED, EXTENDED RELEASE ORAL
Qty: 180 TABLET | Refills: 3 | Status: SHIPPED | OUTPATIENT
Start: 2022-12-13 | End: 2023-12-13

## 2022-12-13 ASSESSMENT — ENCOUNTER SYMPTOMS
NAUSEA: 0
JOINT SWELLING: 0
COUGH: 1
ARTHRALGIAS: 0
FEVER: 0
DYSURIA: 0
DIZZINESS: 0
WEAKNESS: 0
MYALGIAS: 0
SHORTNESS OF BREATH: 0
HEMATURIA: 0
CHILLS: 0
CONSTIPATION: 0
PARESTHESIAS: 0
HEADACHES: 0
FREQUENCY: 0
NERVOUS/ANXIOUS: 0
BREAST MASS: 0
HEARTBURN: 0
ABDOMINAL PAIN: 0
HEMATOCHEZIA: 0
SORE THROAT: 0
EYE PAIN: 0
DIARRHEA: 0
PALPITATIONS: 0

## 2022-12-13 NOTE — PROGRESS NOTES
Patient identified using two patient identifiers.  Ear exam showing wax occlusion completed by RN.  Solution: warm water was placed in the bilateral ear(s) via irrigation tool: elephant ear.   SUBJECTIVE:   CC: Carlene is an 62 year old who presents for preventive health visit.     Patient has been advised of split billing requirements and indicates understanding: Yes  Healthy Habits:     Getting at least 3 servings of Calcium per day:  Yes    Bi-annual eye exam:  Yes    Dental care twice a year:  Yes    Sleep apnea or symptoms of sleep apnea:  None    Diet:  Diabetic    Frequency of exercise:  1 day/week    Duration of exercise:  15-30 minutes    Taking medications regularly:  Yes    Medication side effects:  None    PHQ-2 Total Score: 0    Additional concerns today:  No    Ongoing foot issues with neuropathy. Seeing podiatry.     Walking for exercise. Doing as able with foot issues.  Also utilizing stationary bike.  Encouraged weighted exercise.    Patient notes she ran out of her glipizide and thought she was supposed to discontinue it.  Patient has been off of the glipizide for the past month.  Patient notes her a.m. blood sugars have jumped from 100-150.  Discussed that this is likely an error.  Patient needs to restart her glipizide.  We will expect her A1c to be elevated today.  We will plan to restart and have it rechecked in 3 months.    Today's PHQ-2 Score:   PHQ-2 ( 1999 Pfizer) 12/13/2022   Q1: Little interest or pleasure in doing things 0   Q2: Feeling down, depressed or hopeless 0   PHQ-2 Score 0   Q1: Little interest or pleasure in doing things Not at all   Q2: Feeling down, depressed or hopeless Not at all   PHQ-2 Score 0   Going very well.  Have you ever done Advance Care Planning? (For example, a Health Directive, POLST, or a discussion with a medical provider or your loved ones about your wishes): Yes, advance care planning is on file.    Social History     Tobacco Use     Smoking status: Never      Smokeless tobacco: Never   Substance Use Topics     Alcohol use: Yes     Comment: RARE        Alcohol Use 12/13/2022   Prescreen: >3 drinks/day or >7 drinks/week? No       Reviewed orders with patient.  Reviewed health maintenance and updated orders accordingly - Yes  Lab work is in process    Breast Cancer Screening:    FHS-7: No flowsheet data found.    Mammogram Screening: Recommended mammography every 1-2 years with patient discussion and risk factor consideration  Pertinent mammograms are reviewed under the imaging tab.    History of abnormal Pap smear: Status post benign hysterectomy. Health Maintenance and Surgical History updated.     Reviewed and updated as needed this visit by clinical staff   Tobacco  Allergies  Meds  Problems  Med Hx  Surg Hx  Fam Hx          Reviewed and updated as needed this visit by Provider   Tobacco  Allergies  Meds  Problems  Med Hx  Surg Hx  Fam Hx           Review of Systems   Constitutional: Negative for chills and fever.   HENT: Positive for congestion. Negative for ear pain, hearing loss and sore throat.    Eyes: Negative for pain and visual disturbance.   Respiratory: Positive for cough. Negative for shortness of breath.    Cardiovascular: Negative for chest pain, palpitations and peripheral edema.   Gastrointestinal: Negative for abdominal pain, constipation, diarrhea, heartburn, hematochezia and nausea.   Breasts:  Negative for tenderness, breast mass and discharge.   Genitourinary: Negative for dysuria, frequency, genital sores, hematuria, pelvic pain, urgency, vaginal bleeding and vaginal discharge.   Musculoskeletal: Negative for arthralgias, joint swelling and myalgias.   Skin: Negative for rash.   Neurological: Negative for dizziness, weakness, headaches and paresthesias.   Psychiatric/Behavioral: Negative for mood changes. The patient is not nervous/anxious.         OBJECTIVE:   /59 (BP Location: Left arm, Patient Position: Sitting, Cuff Size:  "Adult Large)   Pulse 82   Temp 97.9  F (36.6  C) (Tympanic)   Resp 16   Ht 1.626 m (5' 4\")   Wt 106.3 kg (234 lb 6.4 oz)   SpO2 98%   BMI 40.23 kg/m    Physical Exam  GENERAL: healthy, alert and no distress  EYES: Eyes grossly normal to inspection, PERRL and conjunctivae and sclerae normal  HENT: ear canals and TM's normal, nose and mouth without ulcers or lesions  NECK: no adenopathy, no asymmetry, masses, or scars and thyroid normal to palpation  RESP: lungs clear to auscultation - no rales, rhonchi or wheezes  CV: regular rate and rhythm, normal S1 S2, no S3 or S4, no murmur, click or rub, no peripheral edema and peripheral pulses strong  ABDOMEN: soft, nontender, no hepatosplenomegaly, no masses and bowel sounds normal  MS: no gross musculoskeletal defects noted, no edema  SKIN: no suspicious lesions or rashes  NEURO: Normal strength and tone, mentation intact and speech normal  PSYCH: mentation appears normal, affect normal/bright    Diagnostic Test Results:  Labs reviewed in Epic    ASSESSMENT/PLAN:   Carlene was seen today for physical.    Diagnoses and all orders for this visit:    Routine general medical examination at a health care facility: Patient okay with all labs and vaccines today.    Screen for colon cancer: Patient received a Cologuard in the past but never completed it.  Discussed options again.  Patient would like to trial the Cologuard.  -     COLOGUARD(EXACT SCIENCES); Future    Diabetic polyneuropathy associated with type 2 diabetes mellitus (H): Patient recently discontinued her glipizide.  We will expect her A1c to be mildly elevated.  Encouraged her to restart her medications.  Will have her come back in in 3 months for recheck.  Recently just got her eye exam.  Sees podiatry for foot exam  -     HEMOGLOBIN A1C; Future  -     Albumin Random Urine Quantitative with Creat Ratio; Future  -     glipiZIDE (GLUCOTROL XL) 10 MG 24 hr tablet; Take 2 tablets (20 mg) by mouth daily before " "breakfast  -     Hemoglobin A1c; Future  -     Lipid panel reflex to direct LDL Non-fasting; Future  -     Albumin Random Urine Quantitative with Creat Ratio  -     HEMOGLOBIN A1C  -     Lipid panel reflex to direct LDL Non-fasting    Anemia, unspecified type: Patient with anemia on her last CBC.  We will recheck today.  -     CBC with platelets; Future  -     CBC with platelets    Other orders  -     ZOSTER VACCINE RECOMBINANT ADJUVANTED (SHINGRIX)  -     INFLUENZA VACCINE 50-64 OR 18-64 W/EGG ALLERGY (FLUBLOK)  -     PNEUMOCOCCAL 20 VALENT CONJUGATE (PREVNAR 20)    Patient has been advised of split billing requirements and indicates understanding: No      COUNSELING:  Reviewed preventive health counseling, as reflected in patient instructions       Regular exercise       Healthy diet/nutrition       Osteoporosis prevention/bone health       Colorectal Cancer Screening       Advance Care Planning      BMI:   Estimated body mass index is 40.23 kg/m  as calculated from the following:    Height as of this encounter: 1.626 m (5' 4\").    Weight as of this encounter: 106.3 kg (234 lb 6.4 oz).         She reports that she has never smoked. She has never used smokeless tobacco.      [facility]  "

## 2022-12-13 NOTE — LETTER
December 22, 2022      Carlene ALBERTO Agee  [address]    [facility] [address]        Dear ,    We are writing to inform you of your test results.    Please call patient with their results. Please let her know her cholesterol is stable.  I want her to continue taking her statin.  Her hemoglobin A1c is up as we predicted.  Please have her restart her glipizide and recheck again in 3 months.  Her hemoglobin continues to be a touch low.  I just want to watch this at this time.  Patient has a small amount of protein leaking into her urine.  This is due to her diabetes.  We will likely see this improve when her sugars improve.     Lisa Leroy DO  [facility]     Resulted Orders   Albumin Random Urine Quantitative with Creat Ratio   Result Value Ref Range    Albumin Urine mg/L 73.8 mg/L      Comment:      The reference ranges have not been established in urine albumin. The results should be integrated into the clinical context for interpretation.    Albumin Urine mg/g Cr 34.65 (H) 0.00 - 25.00 mg/g Cr      Comment:      Microalbuminuria is defined as an albumin:creatinine ratio of 17 to 299 for males and 25 to 299 for females. A ratio of albumin:creatinine of 300 or higher is indicative of overt proteinuria.  Due to biologic variability, positive results should be confirmed by a second, first-morning random or 24-hour timed urine specimen. If there is discrepancy, a third specimen is recommended. When 2 out of 3 results are in the microalbuminuria range, this is evidence for incipient nephropathy and warrants increased efforts at glucose control, blood pressure control, and institution of therapy with an angiotensin-converting-enzyme (ACE) inhibitor (if the patient can tolerate it).      Creatinine Urine mg/dL 213.0 mg/dL      Comment:      The reference ranges have not been established in urine creatinine. The results should be integrated into the clinical context for  interpretation.   HEMOGLOBIN A1C   Result Value Ref Range    Hemoglobin A1C 8.3 (H) 0.0 - 5.6 %      Comment:      Normal <5.7%   Prediabetes 5.7-6.4%    Diabetes 6.5% or higher     Note: Adopted from ADA consensus guidelines.   Lipid panel reflex to direct LDL Non-fasting   Result Value Ref Range    Cholesterol 152 <200 mg/dL    Triglycerides 215 (H) <150 mg/dL    Direct Measure HDL 45 (L) >=50 mg/dL    LDL Cholesterol Calculated 64 <=100 mg/dL    Non HDL Cholesterol 107 <130 mg/dL    Narrative    Cholesterol  Desirable:  <200 mg/dL    Triglycerides  Normal:  Less than 150 mg/dL  Borderline High:  150-199 mg/dL  High:  200-499 mg/dL  Very High:  Greater than or equal to 500 mg/dL    Direct Measure HDL  Female:  Greater than or equal to 50 mg/dL   Male:  Greater than or equal to 40 mg/dL    LDL Cholesterol  Desirable:  <100mg/dL  Above Desirable:  100-129 mg/dL   Borderline High:  130-159 mg/dL   High:  160-189 mg/dL   Very High:  >= 190 mg/dL    Non HDL Cholesterol  Desirable:  130 mg/dL  Above Desirable:  130-159 mg/dL  Borderline High:  160-189 mg/dL  High:  190-219 mg/dL  Very High:  Greater than or equal to 220 mg/dL   CBC with platelets   Result Value Ref Range    WBC Count 6.4 4.0 - 11.0 10e3/uL    RBC Count 3.87 3.80 - 5.20 10e6/uL    Hemoglobin 11.4 (L) 11.7 - 15.7 g/dL    Hematocrit 35.6 35.0 - 47.0 %    MCV 92 78 - 100 fL    MCH 29.5 26.5 - 33.0 pg    MCHC 32.0 31.5 - 36.5 g/dL    RDW 14.6 10.0 - 15.0 %    Platelet Count 307 150 - 450 10e3/uL       If you have any questions or concerns, please call the clinic at the number listed above.       Sincerely,      Lisa Leroy, DO

## 2022-12-19 DIAGNOSIS — I10 ESSENTIAL HYPERTENSION: ICD-10-CM

## 2022-12-20 RX ORDER — AMLODIPINE BESYLATE 10 MG/1
TABLET ORAL
Qty: 90 TABLET | Refills: 0 | Status: SHIPPED | OUTPATIENT
Start: 2022-12-20 | End: 2023-04-03

## 2022-12-21 NOTE — TELEPHONE ENCOUNTER
"Last Written Prescription Date:  11/29/21  Last Fill Quantity: 90,  # refills: 3   Last office visit provider:  12/13/22     Requested Prescriptions   Pending Prescriptions Disp Refills     amLODIPine (NORVASC) 10 MG tablet [Pharmacy Med Name: AMLODIPINE BESYLATE 10MG TABLETS] 90 tablet 3     Sig: TAKE 1 TABLET(10 MG) BY MOUTH DAILY       Calcium Channel Blockers Protocol  Passed - 12/19/2022  5:10 PM        Passed - Blood pressure under 140/90 in past 12 months     BP Readings from Last 3 Encounters:   12/13/22 110/59   04/19/22 122/64   11/29/21 138/58                 Passed - Recent (12 mo) or future (30 days) visit within the authorizing provider's specialty     Patient has had an office visit with the authorizing provider or a provider within the authorizing providers department within the previous 12 mos or has a future within next 30 days. See \"Patient Info\" tab in inbasket, or \"Choose Columns\" in Meds & Orders section of the refill encounter.              Passed - Medication is active on med list        Passed - Patient is age 18 or older        Passed - No active pregnancy on record        Passed - Normal serum creatinine on file in past 12 months     Recent Labs   Lab Test 04/19/22  1433   CR 0.72       Ok to refill medication if creatinine is low          Passed - No positive pregnancy test in past 12 months             Alexa Moses RN 12/20/22 7:13 PM  "

## 2022-12-28 DIAGNOSIS — I10 PRIMARY HYPERTENSION: ICD-10-CM

## 2022-12-29 DIAGNOSIS — I10 PRIMARY HYPERTENSION: ICD-10-CM

## 2022-12-29 RX ORDER — METOPROLOL SUCCINATE 100 MG/1
TABLET, EXTENDED RELEASE ORAL
Qty: 90 TABLET | Refills: 3 | Status: SHIPPED | OUTPATIENT
Start: 2022-12-29

## 2022-12-29 NOTE — TELEPHONE ENCOUNTER
"Last Written Prescription Date:  11/29/21  Last Fill Quantity: 90,  # refills: 3   Last office visit provider:  12/13/22     Requested Prescriptions   Pending Prescriptions Disp Refills     metoprolol succinate ER (TOPROL XL) 100 MG 24 hr tablet [Pharmacy Med Name: METOPROLOL ER SUCCINATE 100MG TABS] 90 tablet 3     Sig: TAKE 1 TABLET(100 MG) BY MOUTH DAILY       Beta-Blockers Protocol Passed - 12/28/2022  6:15 AM        Passed - Blood pressure under 140/90 in past 12 months     BP Readings from Last 3 Encounters:   12/13/22 110/59   04/19/22 122/64   11/29/21 138/58                 Passed - Patient is age 6 or older        Passed - Recent (12 mo) or future (30 days) visit within the authorizing provider's specialty     Patient has had an office visit with the authorizing provider or a provider within the authorizing providers department within the previous 12 mos or has a future within next 30 days. See \"Patient Info\" tab in inbasket, or \"Choose Columns\" in Meds & Orders section of the refill encounter.              Passed - Medication is active on med list             Sisi Tadeo RN 12/29/22 12:58 PM  "

## 2022-12-30 RX ORDER — METOPROLOL SUCCINATE 100 MG/1
TABLET, EXTENDED RELEASE ORAL
Qty: 90 TABLET | Refills: 3 | OUTPATIENT
Start: 2022-12-30

## 2022-12-30 NOTE — TELEPHONE ENCOUNTER
"Last Written Prescription Date:  12/29/22  Last Fill Quantity: 90,  # refills: 3   Last office visit provider:   12/13/22    Requested Prescriptions   Pending Prescriptions Disp Refills     metoprolol succinate ER (TOPROL XL) 100 MG 24 hr tablet [Pharmacy Med Name: METOPROLOL ER SUCCINATE 100MG TABS] 90 tablet 3     Sig: TAKE 1 TABLET(100 MG) BY MOUTH DAILY       Beta-Blockers Protocol Passed - 12/29/2022 11:20 AM        Passed - Blood pressure under 140/90 in past 12 months     BP Readings from Last 3 Encounters:   12/13/22 110/59   04/19/22 122/64   11/29/21 138/58                 Passed - Patient is age 6 or older        Passed - Recent (12 mo) or future (30 days) visit within the authorizing provider's specialty     Patient has had an office visit with the authorizing provider or a provider within the authorizing providers department within the previous 12 mos or has a future within next 30 days. See \"Patient Info\" tab in inbasket, or \"Choose Columns\" in Meds & Orders section of the refill encounter.              Passed - Medication is active on med list             Alma Rosa Nair RN 12/30/22 2:44 PM  "

## 2023-02-27 DIAGNOSIS — I10 PRIMARY HYPERTENSION: ICD-10-CM

## 2023-02-27 DIAGNOSIS — E11.9 TYPE 2 DIABETES, HBA1C GOAL < 8% (H): ICD-10-CM

## 2023-02-28 RX ORDER — BLOOD SUGAR DIAGNOSTIC
STRIP MISCELLANEOUS
Qty: 100 STRIP | Refills: 0 | Status: SHIPPED | OUTPATIENT
Start: 2023-02-28 | End: 2024-03-18

## 2023-02-28 RX ORDER — LISINOPRIL 20 MG/1
TABLET ORAL
Qty: 90 TABLET | Refills: 0 | Status: SHIPPED | OUTPATIENT
Start: 2023-02-28

## 2023-02-28 NOTE — TELEPHONE ENCOUNTER
"Last Written Prescription Date:  11/30/2022  Last Fill Quantity: 90,  # refills: 0   Last office visit provider:  12/13/2022     Requested Prescriptions   Pending Prescriptions Disp Refills     lisinopril (ZESTRIL) 20 MG tablet [Pharmacy Med Name: LISINOPRIL 20MG TABLETS] 90 tablet 0     Sig: TAKE 1 TABLET(20 MG) BY MOUTH DAILY       ACE Inhibitors (Including Combos) Protocol Failed - 2/28/2023  2:55 PM        Failed - Recent (12 mo) or future (30 days) visit within the authorizing provider's specialty     Patient has had an office visit with the authorizing provider or a provider within the authorizing providers department within the previous 12 mos or has a future within next 30 days. See \"Patient Info\" tab in inbasket, or \"Choose Columns\" in Meds & Orders section of the refill encounter.              Passed - Blood pressure under 140/90 in past 12 months     BP Readings from Last 3 Encounters:   12/13/22 110/59   04/19/22 122/64   11/29/21 138/58                 Passed - Medication is active on med list        Passed - Patient is age 18 or older        Passed - No active pregnancy on record        Passed - Normal serum creatinine on file in past 12 months     Recent Labs   Lab Test 04/19/22  1433   CR 0.72       Ok to refill medication if creatinine is low          Passed - Normal serum potassium on file in past 12 months     Recent Labs   Lab Test 04/19/22  1433   POTASSIUM 4.1             Passed - No positive pregnancy test within past 12 months              Last Written Prescription Date:  11/30/2022  Last Fill Quantity: 100,  # refills: 0   Last office visit provider:  12/13/2022     Requested Prescriptions   Pending Prescriptions Disp Refills     ACCU-CHEK GUIDE test strip [Pharmacy Med Name: ACCU-CHEK GUIDE TEST STRIPS 100S] 100 strip 0     Sig: USE TO TEST BLOOD SUGAR ONCE DAILY       Diabetic Supplies Protocol Passed - 2/28/2023  2:56 PM        Passed - Medication is active on med list        Passed - " "Patient is 18 years of age or older        Passed - Recent (6 mo) or future (30 days) visit within the authorizing provider's specialty     Patient had office visit in the last 6 months or has a visit in the next 30 days with authorizing provider.  See \"Patient Info\" tab in inbasket, or \"Choose Columns\" in Meds & Orders section of the refill encounter.                 Shea Nguyen RN 02/28/23 2:56 PM  "

## 2023-04-02 DIAGNOSIS — I10 ESSENTIAL HYPERTENSION: ICD-10-CM

## 2023-04-03 RX ORDER — AMLODIPINE BESYLATE 10 MG/1
TABLET ORAL
Qty: 90 TABLET | Refills: 2 | Status: SHIPPED | OUTPATIENT
Start: 2023-04-03 | End: 2024-01-04

## 2023-04-03 NOTE — TELEPHONE ENCOUNTER
"Last Written Prescription Date:  12/20/2022  Last Fill Quantity: 90,  # refills: 0   Last office visit provider:  12/13/2022     Requested Prescriptions   Pending Prescriptions Disp Refills     amLODIPine (NORVASC) 10 MG tablet [Pharmacy Med Name: AMLODIPINE BESYLATE 10MG TABLETS] 90 tablet 0     Sig: TAKE 1 TABLET(10 MG) BY MOUTH DAILY       Calcium Channel Blockers Protocol  Failed - 4/2/2023 11:47 PM        Failed - Recent (12 mo) or future (30 days) visit within the authorizing provider's specialty     Patient has had an office visit with the authorizing provider or a provider within the authorizing providers department within the previous 12 mos or has a future within next 30 days. See \"Patient Info\" tab in inbasket, or \"Choose Columns\" in Meds & Orders section of the refill encounter.              Passed - Blood pressure under 140/90 in past 12 months     BP Readings from Last 3 Encounters:   12/13/22 110/59   04/19/22 122/64   11/29/21 138/58                 Passed - Medication is active on med list        Passed - Patient is age 18 or older        Passed - No active pregnancy on record        Passed - Normal serum creatinine on file in past 12 months     Recent Labs   Lab Test 04/19/22  1433   CR 0.72       Ok to refill medication if creatinine is low          Passed - No positive pregnancy test in past 12 months             Jennyfer Garcia RN 04/03/23 2:57 AM  "

## 2023-05-26 ENCOUNTER — TELEPHONE (OUTPATIENT)
Dept: FAMILY MEDICINE | Facility: CLINIC | Age: 63
End: 2023-05-26

## 2023-05-26 DIAGNOSIS — E11.42 DIABETIC POLYNEUROPATHY ASSOCIATED WITH TYPE 2 DIABETES MELLITUS (H): ICD-10-CM

## 2023-05-26 DIAGNOSIS — Z23 NEED FOR SHINGLES VACCINE: ICD-10-CM

## 2023-05-26 DIAGNOSIS — E11.9 TYPE 2 DIABETES, HBA1C GOAL < 7% (H): ICD-10-CM

## 2023-05-26 DIAGNOSIS — I10 HYPERTENSION GOAL BP (BLOOD PRESSURE) < 140/90: ICD-10-CM

## 2023-05-26 DIAGNOSIS — Z12.11 SCREEN FOR COLON CANCER: ICD-10-CM

## 2023-05-26 DIAGNOSIS — Z78.0 ASYMPTOMATIC MENOPAUSAL STATE: Primary | ICD-10-CM

## 2023-05-26 RX ORDER — GABAPENTIN 100 MG/1
CAPSULE ORAL
Qty: 240 CAPSULE | Refills: 3 | Status: SHIPPED | OUTPATIENT
Start: 2023-05-26

## 2023-05-26 NOTE — TELEPHONE ENCOUNTER
Health maintenance orders placed. Please let Carlene know. Help schedule mammo.    No future appointments.   Health Maintenance Due   Topic Date Due     DEXA  Never done     DIABETIC FOOT EXAM  Never done     COLORECTAL CANCER SCREENING  Never done     ZOSTER IMMUNIZATION (1 of 2) Never done     MAMMO SCREENING  08/03/2014     EYE EXAM  08/08/2019     COVID-19 Vaccine (4 - Pfizer series) 01/24/2022     PHQ-2 (once per calendar year)  01/01/2023     FALL RISK ASSESSMENT  Never done     BMP  04/19/2023     BP Readings from Last 3 Encounters:   12/13/22 110/59   04/19/22 122/64   11/29/21 138/58     Carlene was seen today for medication refill.    Diagnoses and all orders for this visit:    Asymptomatic menopausal state  -     DEXA HIP/PELVIS/SPINE - Future; Future    Type 2 diabetes, HbA1c goal < 7% (H)  -     gabapentin (NEURONTIN) 100 MG capsule; TAKE 1 CAPSULE(100 MG) BY MOUTH THREE TIMES DAILY    Need for shingles vaccine  -     zoster vaccine recombinant adjuvanted (SHINGRIX) injection; Inject 0.5 mLs into the muscle once for 1 dose Pharmacist administered    Screen for colon cancer  -     Colonoscopy Screening  Referral; Future    Diabetic polyneuropathy associated with type 2 diabetes mellitus (H)  -     Adult Eye  Referral; Future    Hypertension goal BP (blood pressure) < 140/90  -     BASIC METABOLIC PANEL; Future    Other orders  -     PRIMARY CARE FOLLOW-UP SCHEDULING; Future  -     COVID-19 BIVALENT 18+ (MODERNA); Future

## 2023-08-13 DIAGNOSIS — E78.2 MIXED HYPERLIPIDEMIA: ICD-10-CM

## 2023-08-13 RX ORDER — ATORVASTATIN CALCIUM 10 MG/1
10 TABLET, FILM COATED ORAL DAILY
Qty: 90 TABLET | Refills: 1 | Status: SHIPPED | OUTPATIENT
Start: 2023-08-13 | End: 2023-11-24

## 2023-08-13 NOTE — TELEPHONE ENCOUNTER
"Last Written Prescription Date:  8/3/2022  Last Fill Quantity: 90,  # refills: 3   Last office visit provider:  12/13/2022     Requested Prescriptions   Pending Prescriptions Disp Refills    atorvastatin (LIPITOR) 10 MG tablet [Pharmacy Med Name: ATORVASTATIN 10MG TABLETS] 90 tablet 3     Sig: TAKE 1 TABLET(10 MG) BY MOUTH DAILY       Statins Protocol Failed - 8/13/2023  7:51 AM        Failed - Recent (12 mo) or future (30 days) visit within the authorizing provider's specialty     Patient has had an office visit with the authorizing provider or a provider within the authorizing providers department within the previous 12 mos or has a future within next 30 days. See \"Patient Info\" tab in inbasket, or \"Choose Columns\" in Meds & Orders section of the refill encounter.              Passed - LDL on file in past 12 months     Recent Labs   Lab Test 12/13/22  1331   LDL 64             Passed - No abnormal creatine kinase in past 12 months     No lab results found.             Passed - Medication is active on med list        Passed - Patient is age 18 or older        Passed - No active pregnancy on record        Passed - No positive pregnancy test in past 12 months             NIKA DA SILVA RN 08/13/23 7:52 AM  "

## 2023-10-10 ENCOUNTER — TELEPHONE (OUTPATIENT)
Dept: FAMILY MEDICINE | Facility: CLINIC | Age: 63
End: 2023-10-10

## 2023-10-10 NOTE — TELEPHONE ENCOUNTER
1st attempt: Called pt to schedule an appt for Tevin w/Dr. Sanchez no answer. DEBORA Abarca is a new pt who would like to establish care w/ Dr. Sanchez.    HAYLEY BAKER on 10/10/2023 at 3:45 PM     No indicators present

## 2023-11-24 ENCOUNTER — TELEPHONE (OUTPATIENT)
Dept: INTERNAL MEDICINE | Facility: CLINIC | Age: 63
End: 2023-11-24
Payer: COMMERCIAL

## 2023-11-24 DIAGNOSIS — E11.42 DIABETIC POLYNEUROPATHY ASSOCIATED WITH TYPE 2 DIABETES MELLITUS (H): ICD-10-CM

## 2023-11-24 DIAGNOSIS — E11.9 TYPE 2 DIABETES, HBA1C GOAL < 7% (H): ICD-10-CM

## 2023-11-24 DIAGNOSIS — D64.9 ANEMIA, UNSPECIFIED TYPE: ICD-10-CM

## 2023-11-24 DIAGNOSIS — Z12.11 SCREEN FOR COLON CANCER: Primary | ICD-10-CM

## 2023-11-24 DIAGNOSIS — I10 HYPERTENSION GOAL BP (BLOOD PRESSURE) < 140/90: ICD-10-CM

## 2023-11-24 DIAGNOSIS — E78.2 MIXED HYPERLIPIDEMIA: ICD-10-CM

## 2023-11-24 DIAGNOSIS — Z12.31 VISIT FOR SCREENING MAMMOGRAM: ICD-10-CM

## 2023-11-24 PROCEDURE — 90678 RSV VACC PREF BIVALENT IM: CPT | Performed by: STUDENT IN AN ORGANIZED HEALTH CARE EDUCATION/TRAINING PROGRAM

## 2023-11-24 PROCEDURE — 90471 IMMUNIZATION ADMIN: CPT | Performed by: STUDENT IN AN ORGANIZED HEALTH CARE EDUCATION/TRAINING PROGRAM

## 2023-11-24 RX ORDER — ATORVASTATIN CALCIUM 10 MG/1
10 TABLET, FILM COATED ORAL DAILY
Qty: 90 TABLET | Refills: 0 | Status: SHIPPED | OUTPATIENT
Start: 2023-11-24 | End: 2024-05-21

## 2023-11-24 NOTE — TELEPHONE ENCOUNTER
Due for lab + shots + BP check - please schedule  Due for physical  - please schedule  Orders placed for colonoscopy, mammogram, and eye exam.    No future appointments.   Health Maintenance Due   Topic Date Due    DEXA  Never done    DIABETIC FOOT EXAM  Never done    COLORECTAL CANCER SCREENING  Never done    ZOSTER IMMUNIZATION (1 of 2) Never done    MAMMO SCREENING  08/03/2014    RSV VACCINE (Pregnancy & 60+) (1 - 1-dose 60+ series) Never done    PHQ-2 (once per calendar year)  01/01/2023    FALL RISK ASSESSMENT  Never done    BMP  04/19/2023    A1C  06/13/2023    INFLUENZA VACCINE (1) 09/01/2023    COVID-19 Vaccine (4 - 2023-24 season) 09/01/2023    EYE EXAM  09/28/2023    MEDICARE ANNUAL WELLNESS VISIT  12/13/2023    LIPID  12/13/2023    MICROALBUMIN  12/13/2023     BP Readings from Last 3 Encounters:   12/13/22 110/59   04/19/22 122/64   11/29/21 138/58     Carlene was seen today for medication refill.    Diagnoses and all orders for this visit:    Screen for colon cancer  -     Colonoscopy Screening  Referral; Future    Mixed hyperlipidemia  -     atorvastatin (LIPITOR) 10 MG tablet; TAKE 1 TABLET(10 MG) BY MOUTH DAILY    Visit for screening mammogram  -     MA SCREENING DIGITAL BILAT - Future  (s+30); Future    Diabetic polyneuropathy associated with type 2 diabetes mellitus (H)  -     HEMOGLOBIN A1C; Future  -     Adult Eye  Referral; Future  -     Lipid panel reflex to direct LDL Non-fasting; Future  -     Albumin Random Urine Quantitative with Creat Ratio; Future    Type 2 diabetes, HbA1c goal < 7% (H)  -     Comprehensive metabolic panel; Future  -     TSH with free T4 reflex; Future    Hypertension goal BP (blood pressure) < 140/90    Anemia, unspecified type  -     CBC with platelets; Future  -     Iron and iron binding capacity; Future  -     Vitamin B12; Future    Other orders  -     PRIMARY CARE FOLLOW-UP SCHEDULING; Future  -     PRIMARY CARE FOLLOW-UP SCHEDULING; Future  -      PRIMARY CARE FOLLOW-UP SCHEDULING; Future  -     ZOSTER RECOMBINANT ADJUVANTED (SHINGRIX); Future  -     RSV VACCINE (ABRYSVO)  -     INFLUENZA VACCINE 65+ (FLUZONE HD); Future  -     COVID-19 12+ (2023-24) (PFIZER); Future  -     REVIEW OF HEALTH MAINTENANCE PROTOCOL ORDERS

## 2023-11-24 NOTE — LETTER
December 5, 2023      Carlene Agee  [address]    [place] [address]        Dear Carlene,         As a valued [facility] patient, your healthcare needs are our priority.    Your health care team has determined that you are due for an appointment for your Annual Wellness Visit.   We encourage you to call or schedule an appointment with your primary care provider to discuss your overdue screening and schedule an appointment.     If you already have had your screening performed at another health care facility, please ask that practice to send your results to [facility] [phone] and we will update your health records. This will ensure you receive the best possible care from our providers.      If you have any questions or need help with scheduling, please call the [facility] at [phone].        Yours in health,       Your care team at [facility]

## 2023-12-05 NOTE — LETTER
December 5, 2023  Re: Carlene Agee  YOB: 1960    Dear Colleague,    Thank you for your referral to the [facility] EYE [facility]. We have been unable to schedule the referral after several contact attempts.      If you have any questions or concerns, please contact our office at Dept: [phone].        Sincerely,  [facility]

## 2023-12-08 NOTE — PROGRESS NOTES
LMTCB #3. Postponing task to tomorrow to try again. If patient returns call back, please help patient schedule an appointment per message below. Thanks!

## 2023-12-13 DIAGNOSIS — E78.2 MIXED HYPERLIPIDEMIA: ICD-10-CM

## 2023-12-13 DIAGNOSIS — I10 HYPERTENSION GOAL BP (BLOOD PRESSURE) < 140/90: ICD-10-CM

## 2023-12-13 DIAGNOSIS — D64.9 ANEMIA, UNSPECIFIED TYPE: ICD-10-CM

## 2023-12-13 DIAGNOSIS — E11.42 DIABETIC POLYNEUROPATHY ASSOCIATED WITH TYPE 2 DIABETES MELLITUS (H): ICD-10-CM

## 2023-12-13 DIAGNOSIS — E11.9 TYPE 2 DIABETES, HBA1C GOAL < 7% (H): ICD-10-CM

## 2023-12-13 DIAGNOSIS — Z12.11 SCREEN FOR COLON CANCER: Primary | ICD-10-CM

## 2023-12-13 DIAGNOSIS — E11.65 TYPE 2 DIABETES MELLITUS WITH HYPERGLYCEMIA, WITHOUT LONG-TERM CURRENT USE OF INSULIN (H): ICD-10-CM

## 2023-12-13 RX ORDER — GLIPIZIDE 10 MG/1
TABLET, FILM COATED, EXTENDED RELEASE ORAL
Qty: 180 TABLET | Refills: 0 | Status: SHIPPED | OUTPATIENT
Start: 2023-12-13 | End: 2024-03-18

## 2023-12-13 NOTE — TELEPHONE ENCOUNTER
Due for visit  Please schedule:  -lab/shots now  -physical when able    Health Maintenance Due   Topic Date Due    DEXA  Never done    DIABETIC FOOT EXAM  Never done    COLORECTAL CANCER SCREENING  Never done    ZOSTER IMMUNIZATION (1 of 2) Never done    MAMMO SCREENING  08/03/2014    RSV VACCINE (Pregnancy & 60+) (1 - 1-dose 60+ series) Never done    PHQ-2 (once per calendar year)  01/01/2023    FALL RISK ASSESSMENT  Never done    BMP  04/19/2023    A1C  06/13/2023    INFLUENZA VACCINE (1) 09/01/2023    COVID-19 Vaccine (4 - 2023-24 season) 09/01/2023    EYE EXAM  09/28/2023    MEDICARE ANNUAL WELLNESS VISIT  12/13/2023    LIPID  12/13/2023    MICROALBUMIN  12/13/2023     BP Readings from Last 3 Encounters:   12/13/22 110/59   04/19/22 122/64   11/29/21 138/58     Carlene was seen today for medication refill.    Diagnoses and all orders for this visit:    Screen for colon cancer  -     Colonoscopy Screening  Referral; Future    Type 2 diabetes mellitus with hyperglycemia, without long-term current use of insulin (H)  -     metFORMIN (GLUCOPHAGE) 1000 MG tablet; TAKE 1 TABLET(1000 MG) BY MOUTH TWICE DAILY WITH MEALS    Diabetic polyneuropathy associated with type 2 diabetes mellitus (H)  -     glipiZIDE (GLUCOTROL XL) 10 MG 24 hr tablet; TAKE 2 TABLETS(20 MG) BY MOUTH DAILY BEFORE BREAKFAST  -     Adult Eye  Referral; Future    Type 2 diabetes, HbA1c goal < 7% (H)  -     Hemoglobin A1c; Future  -     Comprehensive metabolic panel; Future  -     Albumin Random Urine Quantitative with Creat Ratio; Future  -     TSH with free T4 reflex; Future    Mixed hyperlipidemia  -     Lipid panel reflex to direct LDL Fasting; Future    Hypertension goal BP (blood pressure) < 140/90    Anemia, unspecified type  -     CBC with platelets; Future  -     Iron & Iron Binding Capacity; Future  -     Vitamin B12; Future    Other orders  -     PRIMARY CARE FOLLOW-UP SCHEDULING; Future  -     PRIMARY CARE FOLLOW-UP  SCHEDULING; Future  -     PRIMARY CARE FOLLOW-UP SCHEDULING; Future  -     RSV VACCINE (ABRYSVO)

## 2023-12-13 NOTE — LETTER
December 19, 2023      Carlene Agee  [address]    [place] [address]        Dear Carlene,         As a valued [facility] patient, your healthcare needs are our priority.    Your health care team has determined that you are due for an appointment  labs, vaccines, and physical exam .   We encourage you to call or schedule an appointment with your primary care provider to discuss your overdue screening and schedule an appointment.     If you already have had your screening performed at another health care facility, please ask that practice to send your results to [facility] [phone] and we will update your health records. This will ensure you receive the best possible care from our providers.      If you have any questions or need help with scheduling, please call the [facility] at [phone].        Yours in health,       Your care team at [facility]

## 2023-12-13 NOTE — TELEPHONE ENCOUNTER
Left Voicemail #1 for patient to call back and schedule appointment. Postponing task until tomorrow to follow up with patient. If patient calls back, please assist with scheduling. Thank you!

## 2023-12-19 NOTE — TELEPHONE ENCOUNTER
LMTCB #3. We have made several attempts to contact patient by phone to schedule an appointment. If patient returns call back, please help patient schedule an appointment per message below. Thanks! Unfortunately, our calls have not been returned and we were unable to schedule. At this time, we will no longer make an attempt to schedule this appointment. Completing task.

## 2024-01-03 DIAGNOSIS — I10 ESSENTIAL HYPERTENSION: ICD-10-CM

## 2024-01-03 NOTE — LETTER
January 11, 2024      Carlene Agee  [address]    [place] [address]        Dear Carlene,     As a valued [facility] patient, your healthcare needs are our priority.    Your health care team has determined that you are due for an appointment for your Physical.   We encourage you to call or schedule an appointment with your primary care provider to discuss your overdue screening and schedule an appointment.     If you already have had your screening performed at another health care facility, please ask that practice to send your results to [facility] [phone] and we will update your health records. This will ensure you receive the best possible care from our providers.      If you have any questions or need help with scheduling, please call the [facility] at [phone].        Yours in health,       Your care team at [facility]

## 2024-01-04 RX ORDER — AMLODIPINE BESYLATE 10 MG/1
TABLET ORAL
Qty: 90 TABLET | Refills: 0 | Status: SHIPPED | OUTPATIENT
Start: 2024-01-04 | End: 2024-04-09

## 2024-01-04 NOTE — TELEPHONE ENCOUNTER
Due for visit. Last visit 12/22.  Please schedule lab + mammo + shots now and physical when available.  Lab orders are in so she can do lab/shots now.    No future appointments.   Health Maintenance Due   Topic Date Due    DEXA  Never done    DIABETIC FOOT EXAM  Never done    COLORECTAL CANCER SCREENING  Never done    ZOSTER IMMUNIZATION (1 of 2) Never done    MAMMO SCREENING  08/03/2014    RSV VACCINE (Pregnancy & 60+) (1 - 1-dose 60+ series) Never done    FALL RISK ASSESSMENT  Never done    BMP  04/19/2023    A1C  06/13/2023    INFLUENZA VACCINE (1) 09/01/2023    COVID-19 Vaccine (4 - 2023-24 season) 09/01/2023    EYE EXAM  09/28/2023    LIPID  12/13/2023    MICROALBUMIN  12/13/2023    PHQ-2 (once per calendar year)  01/01/2024    MEDICARE ANNUAL WELLNESS VISIT  12/13/2023     BP Readings from Last 3 Encounters:   12/13/22 110/59   04/19/22 122/64   11/29/21 138/58     Carlene was seen today for medication refill.    Diagnoses and all orders for this visit:    Essential hypertension  -     amLODIPine (NORVASC) 10 MG tablet; TAKE 1 TABLET(10 MG) BY MOUTH DAILY    Other orders  -     RSV VACCINE (ABRYSVO); Future  -     PRIMARY CARE FOLLOW-UP SCHEDULING; Future  -     PRIMARY CARE FOLLOW-UP SCHEDULING; Future  -     PRIMARY CARE FOLLOW-UP SCHEDULING; Future

## 2024-01-11 NOTE — TELEPHONE ENCOUNTER
LMTCB #3. We have made several attempts to contact patient by phone to schedule an appointment. If patient returns call back, please help patient schedule an appointment per message below. Thanks! Unfortunately, our calls have not been returned and we were unable to schedule. At this time, we will no longer make an attempt to schedule this appointment. Completing task. Letter sent.

## 2024-03-15 DIAGNOSIS — E11.65 TYPE 2 DIABETES MELLITUS WITH HYPERGLYCEMIA, WITHOUT LONG-TERM CURRENT USE OF INSULIN (H): ICD-10-CM

## 2024-03-17 DIAGNOSIS — E11.9 TYPE 2 DIABETES, HBA1C GOAL < 8% (H): ICD-10-CM

## 2024-03-17 DIAGNOSIS — E11.42 DIABETIC POLYNEUROPATHY ASSOCIATED WITH TYPE 2 DIABETES MELLITUS (H): ICD-10-CM

## 2024-03-18 RX ORDER — GLIPIZIDE 10 MG/1
TABLET, FILM COATED, EXTENDED RELEASE ORAL
Qty: 180 TABLET | Refills: 0 | Status: SHIPPED | OUTPATIENT
Start: 2024-03-18 | End: 2024-06-17

## 2024-03-18 RX ORDER — BLOOD SUGAR DIAGNOSTIC
STRIP MISCELLANEOUS
Qty: 100 STRIP | Refills: 0 | Status: SHIPPED | OUTPATIENT
Start: 2024-03-18 | End: 2024-06-17

## 2024-03-18 NOTE — TELEPHONE ENCOUNTER
Due for visit. Please schedule.    No future appointments.   Health Maintenance Due   Topic Date Due    DEXA  Never done    DIABETIC FOOT EXAM  Never done    COLORECTAL CANCER SCREENING  Never done    ZOSTER IMMUNIZATION (1 of 2) Never done    MAMMO SCREENING  08/03/2014    RSV VACCINE (Pregnancy & 60+) (1 - 1-dose 60+ series) Never done    FALL RISK ASSESSMENT  Never done    BMP  04/19/2023    A1C  06/13/2023    INFLUENZA VACCINE (1) 09/01/2023    COVID-19 Vaccine (4 - 2023-24 season) 09/01/2023    EYE EXAM  09/28/2023    MEDICARE ANNUAL WELLNESS VISIT  12/13/2023    LIPID  12/13/2023    MICROALBUMIN  12/13/2023    PHQ-2 (once per calendar year)  01/01/2024     BP Readings from Last 3 Encounters:   12/13/22 110/59   04/19/22 122/64   11/29/21 138/58     Carlene was seen today for medication refill.    Diagnoses and all orders for this visit:    Diabetic polyneuropathy associated with type 2 diabetes mellitus (H)  -     glipiZIDE (GLUCOTROL XL) 10 MG 24 hr tablet; TAKE 2 TABLETS(20 MG) BY MOUTH DAILY BEFORE BREAKFAST    Other orders  -     PRIMARY CARE FOLLOW-UP SCHEDULING; Future

## 2024-04-07 ENCOUNTER — TELEPHONE (OUTPATIENT)
Dept: FAMILY MEDICINE | Facility: CLINIC | Age: 64
End: 2024-04-07

## 2024-04-07 DIAGNOSIS — I10 ESSENTIAL HYPERTENSION: ICD-10-CM

## 2024-04-07 NOTE — LETTER
April 15, 2024      Carlene Agee  [address]    [place] [address]        As a valued [facility] patient, your healthcare needs are our priority.    Your health care team has determined that you are due for an appointment for your Annual Wellness Visit.   We encourage you to call or schedule an appointment with your primary care provider to discuss your overdue screening and schedule an appointment.     If you already have had your screening performed at another health care facility, please ask that practice to send your results to [facility] [phone] and we will update your health records. This will ensure you receive the best possible care from our providers.      If you have any questions or need help with scheduling, please call the [facility] at [phone].        Yours in health,       Your care team at [facility]

## 2024-04-09 RX ORDER — AMLODIPINE BESYLATE 10 MG/1
TABLET ORAL
Qty: 90 TABLET | Refills: 0 | Status: SHIPPED | OUTPATIENT
Start: 2024-04-09 | End: 2024-07-05

## 2024-05-21 ENCOUNTER — TELEPHONE (OUTPATIENT)
Dept: INTERNAL MEDICINE | Facility: CLINIC | Age: 64
End: 2024-05-21

## 2024-05-21 DIAGNOSIS — E78.2 MIXED HYPERLIPIDEMIA: Primary | ICD-10-CM

## 2024-05-21 RX ORDER — ATORVASTATIN CALCIUM 10 MG/1
10 TABLET, FILM COATED ORAL DAILY
Qty: 90 TABLET | Refills: 0 | Status: SHIPPED | OUTPATIENT
Start: 2024-05-21

## 2024-05-21 NOTE — TELEPHONE ENCOUNTER
Due for a visit. Please schedule lab/mammo now with physical to follow.    No future appointments.   Health Maintenance Due   Topic Date Due    DEXA  Never done    DIABETIC FOOT EXAM  Never done    COLORECTAL CANCER SCREENING  Never done    ZOSTER IMMUNIZATION (1 of 2) Never done    MAMMO SCREENING  08/03/2014    RSV VACCINE (Pregnancy & 60+) (1 - 1-dose 60+ series) Never done    FALL RISK ASSESSMENT  Never done    BMP  04/19/2023    A1C  06/13/2023    COVID-19 Vaccine (4 - 2023-24 season) 09/01/2023    EYE EXAM  09/28/2023    MEDICARE ANNUAL WELLNESS VISIT  12/13/2023    LIPID  12/13/2023    MICROALBUMIN  12/13/2023    PHQ-2 (once per calendar year)  01/01/2024     BP Readings from Last 3 Encounters:   12/13/22 110/59   04/19/22 122/64   11/29/21 138/58     Carlene was seen today for medication refill.    Diagnoses and all orders for this visit:    Mixed hyperlipidemia  -     atorvastatin (LIPITOR) 10 MG tablet; TAKE 1 TABLET(10 MG) BY MOUTH DAILY  -     PRIMARY CARE FOLLOW-UP SCHEDULING; Future  -     PRIMARY CARE FOLLOW-UP SCHEDULING; Future

## 2024-05-22 NOTE — TELEPHONE ENCOUNTER
Patient is seeing anther provider. Patient declined to schedule an appointment. Completing task.

## 2024-06-11 ENCOUNTER — TELEPHONE (OUTPATIENT)
Dept: FAMILY MEDICINE | Facility: CLINIC | Age: 64
End: 2024-06-11

## 2024-06-11 DIAGNOSIS — E11.65 TYPE 2 DIABETES MELLITUS WITH HYPERGLYCEMIA, WITHOUT LONG-TERM CURRENT USE OF INSULIN (H): ICD-10-CM

## 2024-06-11 DIAGNOSIS — Z78.0 ASYMPTOMATIC MENOPAUSAL STATE: Primary | ICD-10-CM

## 2024-06-15 ENCOUNTER — TELEPHONE (OUTPATIENT)
Dept: FAMILY MEDICINE | Facility: CLINIC | Age: 64
End: 2024-06-15

## 2024-06-15 DIAGNOSIS — E11.9 TYPE 2 DIABETES, HBA1C GOAL < 8% (H): ICD-10-CM

## 2024-06-15 DIAGNOSIS — E11.42 DIABETIC POLYNEUROPATHY ASSOCIATED WITH TYPE 2 DIABETES MELLITUS (H): ICD-10-CM

## 2024-06-17 RX ORDER — BLOOD SUGAR DIAGNOSTIC
STRIP MISCELLANEOUS
Qty: 100 STRIP | Refills: 0 | Status: SHIPPED | OUTPATIENT
Start: 2024-06-17

## 2024-06-17 RX ORDER — GLIPIZIDE 10 MG/1
TABLET, FILM COATED, EXTENDED RELEASE ORAL
Qty: 180 TABLET | Refills: 0 | Status: SHIPPED | OUTPATIENT
Start: 2024-06-17 | End: 2024-09-16

## 2024-06-17 NOTE — TELEPHONE ENCOUNTER
Due for visit. Please schedule:  Lab visit now.  Diabetes visit with diabetes educator.  Physical    No future appointments.   Health Maintenance Due   Topic Date Due    DEXA  Never done    DIABETIC FOOT EXAM  Never done    COLORECTAL CANCER SCREENING  Never done    ZOSTER IMMUNIZATION (1 of 2) Never done    MAMMO SCREENING  08/03/2014    RSV VACCINE (Pregnancy & 60+) (1 - 1-dose 60+ series) Never done    FALL RISK ASSESSMENT  Never done    BMP  04/19/2023    A1C  06/13/2023    COVID-19 Vaccine (4 - 2023-24 season) 09/01/2023    EYE EXAM  09/28/2023    MEDICARE ANNUAL WELLNESS VISIT  12/13/2023    LIPID  12/13/2023    MICROALBUMIN  12/13/2023    PHQ-2 (once per calendar year)  01/01/2024     BP Readings from Last 3 Encounters:   12/13/22 110/59   04/19/22 122/64   11/29/21 138/58     Carlene was seen today for medication refill.    Diagnoses and all orders for this visit:    Diabetic polyneuropathy associated with type 2 diabetes mellitus (H)  -     glipiZIDE (GLUCOTROL XL) 10 MG 24 hr tablet; TAKE 2 TABLETS(20 MG) BY MOUTH DAILY BEFORE BREAKFAST    Type 2 diabetes, HbA1C goal < 8% (H)  -     ACCU-CHEK GUIDE test strip; USE TO TEST BLOOD SUGAR ONCE DAILY

## 2024-06-18 NOTE — TELEPHONE ENCOUNTER
Spoke to pt and she relay that she has a different PCP now that takes care of her health. She no longer see Berhane.

## 2024-07-05 ENCOUNTER — TELEPHONE (OUTPATIENT)
Dept: FAMILY MEDICINE | Facility: CLINIC | Age: 64
End: 2024-07-05

## 2024-07-05 DIAGNOSIS — I10 ESSENTIAL HYPERTENSION: ICD-10-CM

## 2024-07-05 RX ORDER — AMLODIPINE BESYLATE 10 MG/1
TABLET ORAL
Qty: 90 TABLET | Refills: 0 | Status: SHIPPED | OUTPATIENT
Start: 2024-07-05 | End: 2024-10-01

## 2024-07-05 NOTE — TELEPHONE ENCOUNTER
Needs appt. Last visit 1.5 years ago.     No future appointments.   Health Maintenance Due   Topic Date Due    DEXA  Never done    DIABETIC FOOT EXAM  Never done    COLORECTAL CANCER SCREENING  Never done    ZOSTER IMMUNIZATION (1 of 2) Never done    MAMMO SCREENING  08/03/2014    RSV VACCINE (Pregnancy & 60+) (1 - 1-dose 60+ series) Never done    FALL RISK ASSESSMENT  Never done    BMP  04/19/2023    A1C  06/13/2023    COVID-19 Vaccine (4 - 2023-24 season) 09/01/2023    EYE EXAM  09/28/2023    MEDICARE ANNUAL WELLNESS VISIT  12/13/2023    LIPID  12/13/2023    MICROALBUMIN  12/13/2023    PHQ-2 (once per calendar year)  01/01/2024     BP Readings from Last 3 Encounters:   12/13/22 110/59   04/19/22 122/64   11/29/21 138/58     Carlene was seen today for medication refill.    Diagnoses and all orders for this visit:    Essential hypertension  -     amLODIPine (NORVASC) 10 MG tablet; TAKE 1 TABLET(10 MG) BY MOUTH DAILY    Other orders  -     PRIMARY CARE FOLLOW-UP SCHEDULING; Future

## 2024-07-10 NOTE — TELEPHONE ENCOUNTER
Per previous note, she has a different PCP now that takes care of her health. She no longer see Berhane. Task complete.

## 2024-08-17 DIAGNOSIS — E78.2 MIXED HYPERLIPIDEMIA: ICD-10-CM

## 2024-08-19 RX ORDER — ATORVASTATIN CALCIUM 10 MG/1
10 TABLET, FILM COATED ORAL DAILY
Qty: 90 TABLET | Refills: 0 | OUTPATIENT
Start: 2024-08-19

## 2024-08-27 ENCOUNTER — TELEPHONE (OUTPATIENT)
Dept: INTERNAL MEDICINE | Facility: CLINIC | Age: 64
End: 2024-08-27

## 2024-08-27 DIAGNOSIS — E78.2 MIXED HYPERLIPIDEMIA: ICD-10-CM

## 2024-08-27 RX ORDER — ATORVASTATIN CALCIUM 10 MG/1
10 TABLET, FILM COATED ORAL DAILY
Qty: 90 TABLET | Refills: 0 | OUTPATIENT
Start: 2024-08-27

## 2024-08-27 NOTE — TELEPHONE ENCOUNTER
We have been trying to get her in for months and she isn't scheduled yet.     No future appointments.   Health Maintenance Due   Topic Date Due    DEXA  Never done    DIABETIC FOOT EXAM  Never done    COLORECTAL CANCER SCREENING  Never done    ZOSTER IMMUNIZATION (1 of 2) Never done    MAMMO SCREENING  08/03/2014    RSV VACCINE (Pregnancy & 60+) (1 - 1-dose 60+ series) Never done    FALL RISK ASSESSMENT  Never done    BMP  04/19/2023    A1C  06/13/2023    COVID-19 Vaccine (4 - 2023-24 season) 09/01/2023    EYE EXAM  09/28/2023    MEDICARE ANNUAL WELLNESS VISIT  12/13/2023    LIPID  12/13/2023    MICROALBUMIN  12/13/2023    PHQ-2 (once per calendar year)  01/01/2024     BP Readings from Last 3 Encounters:   12/13/22 110/59   04/19/22 122/64   11/29/21 138/58     Carlene was seen today for medication refill.    Diagnoses and all orders for this visit:    Mixed hyperlipidemia

## 2024-08-27 NOTE — TELEPHONE ENCOUNTER
Routing to PCP care team for follow up. Patient has not been seen since 2022. Please assist in scheduling an appointment.

## 2024-08-28 NOTE — TELEPHONE ENCOUNTER
Per previous notes, patient is seeing another provider. Not sure why you are still getting prescriptions sent to you.

## 2024-09-14 ENCOUNTER — TELEPHONE (OUTPATIENT)
Dept: FAMILY MEDICINE | Facility: CLINIC | Age: 64
End: 2024-09-14

## 2024-09-14 DIAGNOSIS — Z12.31 VISIT FOR SCREENING MAMMOGRAM: ICD-10-CM

## 2024-09-14 DIAGNOSIS — E11.65 TYPE 2 DIABETES MELLITUS WITH HYPERGLYCEMIA, WITHOUT LONG-TERM CURRENT USE OF INSULIN (H): ICD-10-CM

## 2024-09-14 DIAGNOSIS — E78.2 MIXED HYPERLIPIDEMIA: ICD-10-CM

## 2024-09-14 DIAGNOSIS — Z12.11 COLON CANCER SCREENING: ICD-10-CM

## 2024-09-14 DIAGNOSIS — Z78.0 ASYMPTOMATIC MENOPAUSAL STATE: Primary | ICD-10-CM

## 2024-09-14 DIAGNOSIS — E11.42 DIABETIC POLYNEUROPATHY ASSOCIATED WITH TYPE 2 DIABETES MELLITUS (H): ICD-10-CM

## 2024-09-16 ENCOUNTER — ORDERS ONLY (AUTO-RELEASED) (OUTPATIENT)
Dept: FAMILY MEDICINE | Facility: CLINIC | Age: 64
End: 2024-09-16

## 2024-09-16 DIAGNOSIS — Z12.11 COLON CANCER SCREENING: ICD-10-CM

## 2024-09-16 RX ORDER — GLIPIZIDE 10 MG/1
TABLET, FILM COATED, EXTENDED RELEASE ORAL
Qty: 180 TABLET | Refills: 0 | Status: SHIPPED | OUTPATIENT
Start: 2024-09-16

## 2024-09-16 NOTE — TELEPHONE ENCOUNTER
Needs appointments:  -Lab, mammogram, and shot visit now.  -mammogram and DEXA when able.  -physical exam when able.    No future appointments.   Health Maintenance Due   Topic Date Due    DEXA  Never done    DIABETIC FOOT EXAM  Never done    COLORECTAL CANCER SCREENING  Never done    ZOSTER IMMUNIZATION (1 of 2) Never done    MAMMO SCREENING  08/03/2014    RSV VACCINE (1 - Risk 60-74 years 1-dose series) Never done    FALL RISK ASSESSMENT  Never done    BMP  04/19/2023    A1C  06/13/2023    EYE EXAM  09/28/2023    MEDICARE ANNUAL WELLNESS VISIT  12/13/2023    LIPID  12/13/2023    MICROALBUMIN  12/13/2023    PHQ-2 (once per calendar year)  01/01/2024    INFLUENZA VACCINE (1) 09/01/2024    COVID-19 Vaccine (4 - 2024-25 season) 09/01/2024     BP Readings from Last 3 Encounters:   12/13/22 110/59   04/19/22 122/64   11/29/21 138/58     Carlene was seen today for medication refill.    Diagnoses and all orders for this visit:    Asymptomatic menopausal state  -     DX Bone Density; Future    Type 2 diabetes mellitus with hyperglycemia, without long-term current use of insulin (H)  -     metFORMIN (GLUCOPHAGE) 1000 MG tablet; TAKE 1 TABLET(1000 MG) BY MOUTH TWICE DAILY WITH MEALS  -     Hemoglobin A1c; Future  -     Comprehensive metabolic panel (BMP + Alb, Alk Phos, ALT, AST, Total. Bili, TP); Future  -     Albumin Random Urine Quantitative with Creat Ratio; Future  -     TSH with free T4 reflex; Future    Diabetic polyneuropathy associated with type 2 diabetes mellitus (H)  -     glipiZIDE (GLUCOTROL XL) 10 MG 24 hr tablet; TAKE 2 TABLETS(20 MG) BY MOUTH DAILY BEFORE BREAKFAST    Visit for screening mammogram  -     MA Screen Bilateral w/Daniel; Future    Colon cancer screening  -     COLOGUARD(EX[employer] SCI[employer]ES); Future    Mixed hyperlipidemia  -     Lipid panel reflex to direct LDL Fasting; Future    Other orders  -     INFLUENZA HIGH DOSE, TRIVALENT, PF (FLUZONE); Future  -     PRIMARY CARE FOLLOW-UP SCHEDULING; Future  -      PRIMARY CARE FOLLOW-UP SCHEDULING; Future  -     RSV VACCINE (ABRYSVO); Future  -     PRIMARY CARE FOLLOW-UP SCHEDULING; Future

## 2024-10-01 ENCOUNTER — TELEPHONE (OUTPATIENT)
Dept: FAMILY MEDICINE | Facility: CLINIC | Age: 64
End: 2024-10-01

## 2024-10-01 DIAGNOSIS — I10 ESSENTIAL HYPERTENSION: ICD-10-CM

## 2024-10-01 DIAGNOSIS — I10 ESSENTIAL HYPERTENSION: Primary | ICD-10-CM

## 2024-10-01 RX ORDER — AMLODIPINE BESYLATE 10 MG/1
TABLET ORAL
Qty: 90 TABLET | OUTPATIENT
Start: 2024-10-01

## 2024-10-01 RX ORDER — AMLODIPINE BESYLATE 10 MG/1
TABLET ORAL
Qty: 30 TABLET | Refills: 0 | Status: SHIPPED | OUTPATIENT
Start: 2024-10-01 | End: 2024-10-02

## 2024-10-01 NOTE — TELEPHONE ENCOUNTER
Please help her schedule shot/lab visit and physical.     No future appointments.   Health Maintenance Due   Topic Date Due    DEXA  Never done    DIABETIC FOOT EXAM  Never done    COLORECTAL CANCER SCREENING  Never done    ZOSTER IMMUNIZATION (1 of 2) Never done    MAMMO SCREENING  08/03/2014    RSV VACCINE (1 - Risk 60-74 years 1-dose series) Never done    FALL RISK ASSESSMENT  Never done    BMP  04/19/2023    A1C  06/13/2023    EYE EXAM  09/28/2023    MEDICARE ANNUAL WELLNESS VISIT  12/13/2023    LIPID  12/13/2023    MICROALBUMIN  12/13/2023    PHQ-2 (once per calendar year)  01/01/2024    INFLUENZA VACCINE (1) 09/01/2024    COVID-19 Vaccine (4 - 2024-25 season) 09/01/2024     BP Readings from Last 3 Encounters:   12/13/22 110/59   04/19/22 122/64   11/29/21 138/58     Carlene was seen today for medication refill.    Diagnoses and all orders for this visit:    Essential hypertension  -     amLODIPine (NORVASC) 10 MG tablet; TAKE 1 TABLET(10 MG) BY MOUTH DAILY  -     PRIMARY CARE FOLLOW-UP SCHEDULING; Future  -     PRIMARY CARE FOLLOW-UP SCHEDULING; Future

## 2024-10-02 RX ORDER — AMLODIPINE BESYLATE 10 MG/1
10 TABLET ORAL DAILY
Qty: 30 TABLET | Refills: 0 | Status: SHIPPED | OUTPATIENT
Start: 2024-10-02

## 2024-10-02 NOTE — TELEPHONE ENCOUNTER
Per previous encounter, patient is seeing another provider. Per patient, please no longer call to schedule the appointment. Completing task.

## 2024-10-04 DIAGNOSIS — I10 ESSENTIAL HYPERTENSION: ICD-10-CM

## 2024-10-04 RX ORDER — AMLODIPINE BESYLATE 10 MG/1
10 TABLET ORAL DAILY
Qty: 90 TABLET | OUTPATIENT
Start: 2024-10-04

## 2024-11-02 DIAGNOSIS — I10 ESSENTIAL HYPERTENSION: ICD-10-CM

## 2024-11-04 RX ORDER — AMLODIPINE BESYLATE 10 MG/1
10 TABLET ORAL DAILY
Qty: 30 TABLET | Refills: 0 | OUTPATIENT
Start: 2024-11-04

## 2024-12-12 ENCOUNTER — TELEPHONE (OUTPATIENT)
Dept: FAMILY MEDICINE | Facility: CLINIC | Age: 64
End: 2024-12-12

## 2024-12-12 DIAGNOSIS — E11.42 DIABETIC POLYNEUROPATHY ASSOCIATED WITH TYPE 2 DIABETES MELLITUS (H): ICD-10-CM

## 2024-12-12 RX ORDER — GLIPIZIDE 10 MG/1
TABLET, FILM COATED, EXTENDED RELEASE ORAL
Qty: 180 TABLET | Refills: 0 | OUTPATIENT
Start: 2024-12-12

## 2024-12-12 NOTE — TELEPHONE ENCOUNTER
Last seen 2 years ago. Needs to be seen. Per previous encounters, she is seeing another provider.   Please let the pharmacy know to not send us refills.    No future appointments.   Health Maintenance Due   Topic Date Due    DEXA  Never done    DIABETIC FOOT EXAM  Never done    COLORECTAL CANCER SCREENING  Never done    ZOSTER IMMUNIZATION (1 of 2) Never done    MAMMO SCREENING  08/03/2014    RSV VACCINE (1 - Risk 60-74 years 1-dose series) Never done    FALL RISK ASSESSMENT  Never done    BMP  04/19/2023    A1C  06/13/2023    EYE EXAM  09/28/2023    MEDICARE ANNUAL WELLNESS VISIT  12/13/2023    LIPID  12/13/2023    MICROALBUMIN  12/13/2023    PHQ-2 (once per calendar year)  01/01/2024    INFLUENZA VACCINE (1) 09/01/2024    COVID-19 Vaccine (4 - 2024-25 season) 09/01/2024     BP Readings from Last 3 Encounters:   12/13/22 110/59   04/19/22 122/64   11/29/21 138/58     Carlene was seen today for medication refill.    Diagnoses and all orders for this visit:    Diabetic polyneuropathy associated with type 2 diabetes mellitus (H)

## 2024-12-21 DIAGNOSIS — E11.65 TYPE 2 DIABETES MELLITUS WITH HYPERGLYCEMIA, WITHOUT LONG-TERM CURRENT USE OF INSULIN (H): ICD-10-CM

## 2025-01-11 DIAGNOSIS — E11.9 TYPE 2 DIABETES, HBA1C GOAL < 7% (H): ICD-10-CM

## 2025-01-13 RX ORDER — GABAPENTIN 100 MG/1
CAPSULE ORAL
Qty: 240 CAPSULE | Refills: 3 | OUTPATIENT
Start: 2025-01-13

## 2025-07-31 ENCOUNTER — LAB (OUTPATIENT)
Dept: LAB | Facility: CLINIC | Age: 65
End: 2025-07-31
Payer: COMMERCIAL

## 2025-07-31 DIAGNOSIS — Z01.818 PRE-OP EXAM: Primary | ICD-10-CM

## 2025-07-31 LAB
BASOPHILS # BLD AUTO: 0 10E3/UL (ref 0–0.2)
BASOPHILS NFR BLD AUTO: 1 %
EOSINOPHIL # BLD AUTO: 0.1 10E3/UL (ref 0–0.7)
EOSINOPHIL NFR BLD AUTO: 1 %
ERYTHROCYTE [DISTWIDTH] IN BLOOD BY AUTOMATED COUNT: 14.3 % (ref 10–15)
HCT VFR BLD AUTO: 33.9 % (ref 35–47)
HGB BLD-MCNC: 11.1 G/DL (ref 11.7–15.7)
IMM GRANULOCYTES # BLD: 0 10E3/UL
IMM GRANULOCYTES NFR BLD: 0 %
LYMPHOCYTES # BLD AUTO: 2.5 10E3/UL (ref 0.8–5.3)
LYMPHOCYTES NFR BLD AUTO: 30 %
MCH RBC QN AUTO: 29.5 PG (ref 26.5–33)
MCHC RBC AUTO-ENTMCNC: 32.7 G/DL (ref 31.5–36.5)
MCV RBC AUTO: 90 FL (ref 78–100)
MONOCYTES # BLD AUTO: 0.6 10E3/UL (ref 0–1.3)
MONOCYTES NFR BLD AUTO: 7 %
NEUTROPHILS # BLD AUTO: 5.1 10E3/UL (ref 1.6–8.3)
NEUTROPHILS NFR BLD AUTO: 61 %
NRBC # BLD AUTO: 0 10E3/UL
NRBC BLD AUTO-RTO: 0 /100
PLATELET # BLD AUTO: 320 10E3/UL (ref 150–450)
RBC # BLD AUTO: 3.76 10E6/UL (ref 3.8–5.2)
WBC # BLD AUTO: 8.5 10E3/UL (ref 4–11)

## 2025-07-31 PROCEDURE — 36415 COLL VENOUS BLD VENIPUNCTURE: CPT

## 2025-07-31 PROCEDURE — 85004 AUTOMATED DIFF WBC COUNT: CPT

## (undated) DEVICE — SU VICRYL 3-0 SH 27" J316H

## (undated) DEVICE — GLOVE PROTEXIS W/NEU-THERA 6.5  2D73TE65

## (undated) DEVICE — Device

## (undated) DEVICE — BLADE KNIFE SURG 10 371110

## (undated) DEVICE — DRSG KERLIX 4 1/2"X4YDS ROLL 6715

## (undated) DEVICE — SU ETHILON 2-0 FS 18" 664H

## (undated) DEVICE — DRAPE STERI TOWEL LG 1010

## (undated) DEVICE — SOL WATER IRRIG 1000ML BOTTLE 2F7114

## (undated) DEVICE — SU ETHILON 3-0 FS-1 18" 663G

## (undated) DEVICE — DRSG ADAPTIC 3X8" 6113

## (undated) DEVICE — STPL SKIN PROXIMATE 35 WIDE PMW35

## (undated) DEVICE — BNDG ELASTIC 4" DBL LENGTH UNSTERILE 6611-14

## (undated) DEVICE — ESU GROUND PAD UNIVERSAL W/O CORD

## (undated) DEVICE — NDL 25GA 1.5" 305127

## (undated) DEVICE — DRSG KERLIX FLUFFS X5

## (undated) DEVICE — CAST PADDING 6" STERILE 9046S

## (undated) DEVICE — ESU PENCIL W/HOLSTER E2350H

## (undated) DEVICE — NDL 19GA 1.5"

## (undated) DEVICE — LINEN TOWEL PACK X5 5464

## (undated) DEVICE — PREP CHLORAPREP 26ML TINTED ORANGE  260815

## (undated) DEVICE — PACK EXTREMITY SOP15EXFSD

## (undated) DEVICE — NDL 27GA 1.25" 305136

## (undated) DEVICE — CAST PADDING 4" STERILE 9044S

## (undated) DEVICE — GLOVE PROTEXIS POWDER FREE 6.5 ORTHOPEDIC 2D73ET65

## (undated) RX ORDER — BUPIVACAINE HYDROCHLORIDE 5 MG/ML
INJECTION, SOLUTION EPIDURAL; INTRACAUDAL
Status: DISPENSED
Start: 2019-10-22

## (undated) RX ORDER — PROPOFOL 10 MG/ML
INJECTION, EMULSION INTRAVENOUS
Status: DISPENSED
Start: 2019-10-22

## (undated) RX ORDER — LIDOCAINE HYDROCHLORIDE 20 MG/ML
INJECTION, SOLUTION EPIDURAL; INFILTRATION; INTRACAUDAL; PERINEURAL
Status: DISPENSED
Start: 2019-10-22

## (undated) RX ORDER — GINSENG 100 MG
CAPSULE ORAL
Status: DISPENSED
Start: 2019-10-22

## (undated) RX ORDER — LIDOCAINE HYDROCHLORIDE 10 MG/ML
INJECTION, SOLUTION EPIDURAL; INFILTRATION; INTRACAUDAL; PERINEURAL
Status: DISPENSED
Start: 2019-10-22

## (undated) RX ORDER — ONDANSETRON 2 MG/ML
INJECTION INTRAMUSCULAR; INTRAVENOUS
Status: DISPENSED
Start: 2019-10-22

## (undated) RX ORDER — METHYLPREDNISOLONE ACETATE 40 MG/ML
INJECTION, SUSPENSION INTRA-ARTICULAR; INTRALESIONAL; INTRAMUSCULAR; SOFT TISSUE
Status: DISPENSED
Start: 2019-10-22

## (undated) RX ORDER — VANCOMYCIN HYDROCHLORIDE 1 G/200ML
INJECTION, SOLUTION INTRAVENOUS
Status: DISPENSED
Start: 2019-10-22

## (undated) RX ORDER — FENTANYL CITRATE 50 UG/ML
INJECTION, SOLUTION INTRAMUSCULAR; INTRAVENOUS
Status: DISPENSED
Start: 2019-10-22